# Patient Record
Sex: MALE | Race: WHITE | HISPANIC OR LATINO | Employment: FULL TIME | URBAN - METROPOLITAN AREA
[De-identification: names, ages, dates, MRNs, and addresses within clinical notes are randomized per-mention and may not be internally consistent; named-entity substitution may affect disease eponyms.]

---

## 2017-03-15 ENCOUNTER — OFFICE VISIT (OUTPATIENT)
Dept: ORTHOPEDICS | Facility: CLINIC | Age: 62
End: 2017-03-15
Payer: COMMERCIAL

## 2017-03-15 ENCOUNTER — HOSPITAL ENCOUNTER (OUTPATIENT)
Dept: RADIOLOGY | Facility: HOSPITAL | Age: 62
Discharge: HOME | End: 2017-03-15
Attending: NURSE PRACTITIONER
Payer: COMMERCIAL

## 2017-03-15 VITALS — HEIGHT: 70 IN | WEIGHT: 197 LBS | BODY MASS INDEX: 28.2 KG/M2

## 2017-03-15 DIAGNOSIS — M25.512 LEFT SHOULDER PAIN, UNSPECIFIED CHRONICITY: Primary | ICD-10-CM

## 2017-03-15 DIAGNOSIS — M25.512 LEFT SHOULDER PAIN, UNSPECIFIED CHRONICITY: ICD-10-CM

## 2017-03-15 PROCEDURE — 73030 X-RAY EXAM OF SHOULDER: CPT | Mod: 26,LT,, | Performed by: RADIOLOGY

## 2017-03-15 PROCEDURE — 99499 UNLISTED E&M SERVICE: CPT | Mod: S$GLB,,, | Performed by: NURSE PRACTITIONER

## 2017-03-15 PROCEDURE — 99999 PR PBB SHADOW E&M-EST. PATIENT-LVL III: CPT | Mod: PBBFAC,,, | Performed by: NURSE PRACTITIONER

## 2017-03-15 PROCEDURE — 73030 X-RAY EXAM OF SHOULDER: CPT | Mod: TC,LT

## 2017-03-16 ENCOUNTER — HOSPITAL ENCOUNTER (OUTPATIENT)
Dept: RADIOLOGY | Facility: HOSPITAL | Age: 62
Discharge: HOME | End: 2017-03-16
Attending: NURSE PRACTITIONER
Payer: COMMERCIAL

## 2017-03-16 DIAGNOSIS — M25.512 LEFT SHOULDER PAIN, UNSPECIFIED CHRONICITY: ICD-10-CM

## 2017-03-16 PROCEDURE — 73221 MRI JOINT UPR EXTREM W/O DYE: CPT | Mod: TC,LT

## 2017-03-16 PROCEDURE — 73221 MRI JOINT UPR EXTREM W/O DYE: CPT | Mod: 26,LT,, | Performed by: RADIOLOGY

## 2017-03-17 NOTE — PROGRESS NOTES
CC: Pain of the Left Shoulder      HPI: Pt with left shoulder pain for the past few weeks. The pain is located over the ac joint. It is worse with raising his arm and reaching. 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    ROS  General: denies fever and chills  Resp: no c/o sob  CVS: no c/o cp  MSK: c/o left shoulder pain which is aching and located over the ac joint    PE  General: AAOx3, pleasant and cooperative  Resp: respirations even and unlabored  MSK: left shoulder exam  - neer  - ingram  + pain with internal and external rotation      Xray:  Ordered and reviewed by me: Bones of the left shoulder appear normal no soft tissue calcification or bony abnormality    Assessment:  Left shoulder pain    Plan:  MRI for further evaluation per Dr. Tirado's advice  F/u results by [facility]  Will forward results to Dr. Tirado

## 2017-03-21 ENCOUNTER — PATIENT MESSAGE (OUTPATIENT)
Dept: ORTHOPEDICS | Facility: CLINIC | Age: 62
End: 2017-03-21

## 2017-03-28 DIAGNOSIS — M75.52 BURSITIS, SHOULDER, LEFT: Primary | ICD-10-CM

## 2017-05-02 ENCOUNTER — CLINICAL SUPPORT (OUTPATIENT)
Dept: REHABILITATION | Facility: HOSPITAL | Age: 62
End: 2017-05-02
Attending: NURSE PRACTITIONER
Payer: COMMERCIAL

## 2017-05-02 DIAGNOSIS — M25.512 LEFT SHOULDER PAIN, UNSPECIFIED CHRONICITY: ICD-10-CM

## 2017-05-02 PROBLEM — M25.519 SHOULDER PAIN: Status: ACTIVE | Noted: 2017-05-02

## 2017-05-02 PROCEDURE — 97161 PT EVAL LOW COMPLEX 20 MIN: CPT

## 2017-05-02 PROCEDURE — 97110 THERAPEUTIC EXERCISES: CPT

## 2017-05-02 NOTE — PROGRESS NOTES
Physical Therapy Initial Evaluation       Date: 05/02/2017    Patient Name: Juan Luis Mcgrath  Clinic Number: [id number]  Age: 61 y.o.  Gender: male    Diagnosis:   Encounter Diagnosis   Name Primary?    Left shoulder pain, unspecified chronicity        Referring Physician: Janiya Wilkins NP  Treatment Orders: PT Eval and Treat      History     Past Medical History:   Diagnosis Date    Prostate cancer 2013       No current outpatient prescriptions on file.     No current facility-administered medications for this visit.        Review of patient's allergies indicates:  No Known Allergies       Subjective     Patient states:  Pt hurt his left shoulder 1 1/2 months ago performing a seated bench press without a warm up. Pt experienced pain the next day. p reports condition has not improved since onset and her has not resumed weight lifting.  Diagnostic Tests:   1.  Tendinosis, partial thickness tear along the articular surface of the anterior footprint, and mild bursal surface fraying of the supraspinatus tendon.    2. Moderate AC joint arthropathy.  ______________________________________   Pain Scale: Juan Luis rates pain on a scale of 0-10 to be 6 at worst; 0 currently; 0 at best .highest with active reaching with left arm  Onset: sudden  Radicular symptoms:  denied  Aggravating factors:   Reaching Oh/behind himself/putting belts on/off  Easing factors: nothing, pt occ takes alleve  Prior Therapy:none  Functional Deficits Leading to Referral: wants to return to weight lifting 1-3 x/wk  Prior functional status: lifts weights/runs weekly  Occupation: anesthesiologist               Pts goals: Pt wishes to return to previous work outs without UE pain    Objective     Posture: rounded shoulder/slight inc thoracic kyphosis  Dermatomes: intact BUE    Palpation: inc thickness bicep tendon/RTC tendons L    Shoulder Range of Motion: Pain > 90 degrees abd/flex as well as in end  ranges    PASSIVE ROM LEFT RIGHT   Flexion 140 170   Abduction 150 170   IR/90deg 80 90   ER/90deg 90 90                STRENGTH LEFT RIGHT   Flexion 4/5 5/5   Abduction 4/5 5/5   supraspinatous 4-5 5/5   IR (neutral) 4+/5 5/5   ER (neutral) 4-/5 5/5   Middle Trap 4/5 5/5   Lower Trfap 4/5 5/5   bicep 4+/5/tricep 4+/5    Scapular Control/Dyskinesis:    Normal / Subtle / Obvious   Left subtle   Right normal     Special Tests:    Left   Empty Can (Supraspinatus) Pain/pos       Neer Imgingement pain   Mahoney Sundar pos   Crossover Impingment pain     Tool: FOTO Shoulder Survey  Score: 37% Limitation       TREATMENT     Time In: 4:00pm  Time Out: 5:00pm    PT Evaluation Completed? Yes  Discussed Plan of Care with patient: Yes    Juan Luis received 0 minutes of therapeutic exercise & instruction including:  Pulleys ABD/flex/ER  ER stretch  SL flex (pain free)  SL  ER  SL hor abd  Prone row  Prone T Y  Walk outs with TB  scaption   Serratus  Lawn mowers  W's  CP x 10 min'    Juan Luis received 0 minutes of manual therapy including:  GH joint mobs    Written Home Exercises Provided: scanned into chart  Juan Luis demo good understanding of the education provided. Patient demo good return demo of skill of exercises.      Assessment     Patient presents with dec L shoulder ROM/strength, altered GH mechanics, scapula dyskinesia, 37% FOTO disability score and pain with ADL's  Pt prognosis is Good.  Pt will benefit from skilled outpatient physical therapy to address the above stated deficits, provide pt/family education and to maximize pt's level of independence.     History  Co-morbidities and personal factors that may impact the plan of care Examination  Body Structures and Functions, activity limitations and participation restrictions that may impact the plan of care Clinical Presentation   Decision Making/ Complexity Score   Co-morbidities:   none        Personal Factors:   no deficits Body Regions:   upper extremities    Body Systems:    ROM  strength    Activity limitations:   Learning and applying knowledge  no deficits    General Tasks and Commands  no deficits    Communication  no deficits    Mobility  no deficits    Self care  no deficits    Domestic Life  no deficits    Interactions/Relationships  no deficits    Life Areas  no deficits    Community and Social Life  no deficits    Participation Restrictions:   Pt unable to lift weight OH and reach behind me       stable and uncomplicated          Pt's spiritual, cultural and educational needs considered and pt agreeable to plan of care and goals as stated below:     Anticipated Barriers for physical therapy: time restraints    Short Term GOALS: 3 weeks. Pt agrees with goals set.  1. Patient demonstrates independence with HEP.   2. Patient demonstrates independence with Postural Awareness.   3. Patient demonstrates independence with body mechanics.     Long Term Goals 6 Weeks. Pt agrees with goals set:  1. Pt will increase ROM to full to improve functional reach pain free.   2. Pt will increase strength to 5 to improve tolerance to all functional activities pain free.   3. Pt demonstrates improved function per FOTO Shoulder Survey to 20% Limitation or less.   4. Pt will return to previous lifting//workout schedule without pain  5. Pt will reach OH/behind back without pain  Plan     Outpatient physical therapy 2 times weekly to include: pt ed, hep, therapeutic exercises, neuromuscular re-education/ balance exercises, joint mobilizations, aquatic therapy and modalities prn. Cont PT for  6 weeks. Pt may be seen by PTA as part of the rehabilitation team.    Therapist: Lola Roberto, PT  5/2/2017

## 2017-05-03 NOTE — PLAN OF CARE
Physical Therapy Initial Evaluation       Date: 05/02/2017    Patient Name: Juan Luis Mcgrath  Clinic Number: [id number]  Age: 61 y.o.  Gender: male    Diagnosis:   Encounter Diagnosis   Name Primary?    Left shoulder pain, unspecified chronicity        Referring Physician: Janiya Wilkins NP  Treatment Orders: PT Eval and Treat      History     Past Medical History:   Diagnosis Date    Prostate cancer 2013       No current outpatient prescriptions on file.     No current facility-administered medications for this visit.        Review of patient's allergies indicates:  No Known Allergies       Subjective     Patient states:  Pt hurt his left shoulder 1 1/2 months ago performing a seated bench press without a warm up. Pt experienced pain the next day. p reports condition has not improved since onset and her has not resumed weight lifting.  Diagnostic Tests:   1.  Tendinosis, partial thickness tear along the articular surface of the anterior footprint, and mild bursal surface fraying of the supraspinatus tendon.    2. Moderate AC joint arthropathy.  ______________________________________   Pain Scale: Juan Luis rates pain on a scale of 0-10 to be 6 at worst; 0 currently; 0 at best .highest with active reaching with left arm  Onset: sudden  Radicular symptoms:  denied  Aggravating factors:   Reaching Oh/behind himself/putting belts on/off  Easing factors: nothing, pt occ takes alleve  Prior Therapy:none  Functional Deficits Leading to Referral: wants to return to weight lifting 1-3 x/wk  Prior functional status: lifts weights/runs weekly  Occupation: anesthesiologist               Pts goals: Pt wishes to return to previous work outs without UE pain    Objective     Posture: rounded shoulder/slight inc thoracic kyphosis  Dermatomes: intact BUE    Palpation: inc thickness bicep tendon/RTC tendons L    Shoulder Range of Motion: Pain > 90 degrees abd/flex as well as in end  ranges    PASSIVE ROM LEFT RIGHT   Flexion 140 170   Abduction 150 170   IR/90deg 80 90   ER/90deg 90 90                STRENGTH LEFT RIGHT   Flexion 4/5 5/5   Abduction 4/5 5/5   supraspinatous 4-5 5/5   IR (neutral) 4+/5 5/5   ER (neutral) 4-/5 5/5   Middle Trap 4/5 5/5   Lower Trfap 4/5 5/5   bicep 4+/5/tricep 4+/5    Scapular Control/Dyskinesis:    Normal / Subtle / Obvious   Left subtle   Right normal     Special Tests:    Left   Empty Can (Supraspinatus) Pain/pos       Neer Imgingement pain   Mahoney Sundar pos   Crossover Impingment pain     Tool: FOTO Shoulder Survey  Score: 37% Limitation       TREATMENT     Time In: 4:00pm  Time Out: 5:00pm    PT Evaluation Completed? Yes  Discussed Plan of Care with patient: Yes    Juan Luis received 0 minutes of therapeutic exercise & instruction including:  Pulleys ABD/flex/ER  ER stretch  SL flex (pain free)  SL  ER  SL hor abd  Prone row  Prone T Y  Walk outs with TB  scaption   Serratus  Lawn mowers  W's  CP x 10 min'    Juan Luis received 0 minutes of manual therapy including:  GH joint mobs    Written Home Exercises Provided: scanned into chart  Juan Luis demo good understanding of the education provided. Patient demo good return demo of skill of exercises.      Assessment     Patient presents with dec L shoulder ROM/strength, altered GH mechanics, scapula dyskinesia, 37% FOTO disability score and pain with ADL's  Pt prognosis is Good.  Pt will benefit from skilled outpatient physical therapy to address the above stated deficits, provide pt/family education and to maximize pt's level of independence.     History  Co-morbidities and personal factors that may impact the plan of care Examination  Body Structures and Functions, activity limitations and participation restrictions that may impact the plan of care Clinical Presentation   Decision Making/ Complexity Score   Co-morbidities:   none        Personal Factors:   no deficits Body Regions:   upper extremities    Body Systems:    ROM  strength    Activity limitations:   Learning and applying knowledge  no deficits    General Tasks and Commands  no deficits    Communication  no deficits    Mobility  no deficits    Self care  no deficits    Domestic Life  no deficits    Interactions/Relationships  no deficits    Life Areas  no deficits    Community and Social Life  no deficits    Participation Restrictions:   Pt unable to lift weight OH and reach behind me       stable and uncomplicated          Pt's spiritual, cultural and educational needs considered and pt agreeable to plan of care and goals as stated below:     Anticipated Barriers for physical therapy: time restraints    Short Term GOALS: 3 weeks. Pt agrees with goals set.  1. Patient demonstrates independence with HEP.   2. Patient demonstrates independence with Postural Awareness.   3. Patient demonstrates independence with body mechanics.     Long Term Goals 6 Weeks. Pt agrees with goals set:  1. Pt will increase ROM to full to improve functional reach pain free.   2. Pt will increase strength to 5 to improve tolerance to all functional activities pain free.   3. Pt demonstrates improved function per FOTO Shoulder Survey to 20% Limitation or less.   4. Pt will return to previous lifting//workout schedule without pain  5. Pt will reach OH/behind back without pain  Plan     Outpatient physical therapy 2 times weekly to include: pt ed, hep, therapeutic exercises, neuromuscular re-education/ balance exercises, joint mobilizations, aquatic therapy and modalities prn. Cont PT for  6 weeks. Pt may be seen by PTA as part of the rehabilitation team.    Therapist: Lola Roberto, PT  5/2/2017

## 2017-07-28 DIAGNOSIS — Z12.11 COLON CANCER SCREENING: ICD-10-CM

## 2017-08-02 ENCOUNTER — OFFICE VISIT (OUTPATIENT)
Dept: OPTOMETRY | Facility: CLINIC | Age: 62
End: 2017-08-02
Payer: COMMERCIAL

## 2017-08-02 DIAGNOSIS — Z46.0 FITTING AND ADJUSTMENT OF SPECTACLES AND CONTACT LENSES: Primary | ICD-10-CM

## 2017-08-02 DIAGNOSIS — H25.13 NS (NUCLEAR SCLEROSIS), BILATERAL: ICD-10-CM

## 2017-08-02 DIAGNOSIS — H52.13 MYOPIA, BILATERAL: Primary | ICD-10-CM

## 2017-08-02 PROCEDURE — 92004 COMPRE OPH EXAM NEW PT 1/>: CPT | Mod: S$GLB,,, | Performed by: OPTOMETRIST

## 2017-08-02 PROCEDURE — 99999 PR PBB SHADOW E&M-EST. PATIENT-LVL II: CPT | Mod: PBBFAC,,, | Performed by: OPTOMETRIST

## 2017-08-02 PROCEDURE — 92015 DETERMINE REFRACTIVE STATE: CPT | Mod: S$GLB,,, | Performed by: OPTOMETRIST

## 2017-08-02 PROCEDURE — 92310 CONTACT LENS FITTING OU: CPT | Mod: S$GLB,,, | Performed by: OPTOMETRIST

## 2017-08-02 NOTE — PROGRESS NOTES
HPI     Patient's age: 61 y.o.      Approximate date of last eye examination:  1 yr ago  Name of last eye doctor seen: Arcenio muñoz    Pt states that he is her for eye exam and contacts.  Not having any   trouble with eyes.  Need updated rx for contacts    Wears glasses? yes      Wears CLs?:  Yes, Acuvue TrueEyes do sleep in lens           Headaches?  no  Eye pain/discomfort?  no                                                                                     Flashes?  no  Floaters?  no  Diplopia/Double vision?  no    Patient's Ocular History:         Any eye surgeries? no         Any eye injury?  no         Any treatment for eye disease?  no  Family history of eye disease?  no    Significant patient medical history:         1. Diabetes?  no       If yes, IDDM or NIDDM? no   2. HBP?  no                   ! OTC eyedrops currently using:  none   ! Prescription eye meds currently using:  none        Last edited by Jacqueline Akers MA on 8/2/2017  1:50 PM. (History)            Assessment /Plan     For exam results, see Encounter Report.    Myopia, bilateral   Rx specs    NS (nuclear sclerosis), bilateral   Mild, monitor    CL fit today   Dispensed trials of true eye dailies    Changed to -6.50 DS OD distance, -4.00DS OS near   OK to order if happy    Good internal ocular health, monitor yearly

## 2017-08-22 ENCOUNTER — HOSPITAL ENCOUNTER (OUTPATIENT)
Dept: RADIOLOGY | Facility: HOSPITAL | Age: 62
Discharge: HOME | End: 2017-08-22
Attending: ORTHOPAEDIC SURGERY
Payer: COMMERCIAL

## 2017-08-22 ENCOUNTER — OFFICE VISIT (OUTPATIENT)
Dept: SPORTS MEDICINE | Facility: CLINIC | Age: 62
End: 2017-08-22
Payer: COMMERCIAL

## 2017-08-22 VITALS — WEIGHT: 197 LBS | BODY MASS INDEX: 28.2 KG/M2 | HEIGHT: 70 IN

## 2017-08-22 DIAGNOSIS — M25.572 LEFT ANKLE PAIN, UNSPECIFIED CHRONICITY: Primary | ICD-10-CM

## 2017-08-22 DIAGNOSIS — M25.572 LEFT ANKLE PAIN, UNSPECIFIED CHRONICITY: ICD-10-CM

## 2017-08-22 DIAGNOSIS — S93.402A LEFT ANKLE SPRAIN: ICD-10-CM

## 2017-08-22 PROCEDURE — 3008F BODY MASS INDEX DOCD: CPT | Mod: S$GLB,,, | Performed by: ORTHOPAEDIC SURGERY

## 2017-08-22 PROCEDURE — 99203 OFFICE O/P NEW LOW 30 MIN: CPT | Mod: S$GLB,,, | Performed by: ORTHOPAEDIC SURGERY

## 2017-08-22 PROCEDURE — 73610 X-RAY EXAM OF ANKLE: CPT | Mod: 26,LT,, | Performed by: RADIOLOGY

## 2017-08-22 PROCEDURE — 73610 X-RAY EXAM OF ANKLE: CPT | Mod: TC,PO,LT

## 2017-08-22 PROCEDURE — 99999 PR PBB SHADOW E&M-EST. PATIENT-LVL III: CPT | Mod: PBBFAC,,, | Performed by: ORTHOPAEDIC SURGERY

## 2017-08-22 NOTE — PROGRESS NOTES
"CHIEF COMPLAINT: Left Foot pain.                                                          HISTORY OF PRESENT ILLNESS:  The patient is a 62 y.o. male  who presents  for evaluation of his left foot pain.     History of Trauma: yes, running on Saturday, turned ankle on a rock  Pain Duration: 3 days  Pain Quality: achy  Pain Context:improving  Pain Timing: constant  Pain Location:medial, lateral and inferior  Pain Severity: moderate  Modifying Factors: worse with movement and weight bearing  Previous Treatments: rest and compression  Associated Signs and Symptoms:none        PAST MEDICAL HISTORY:   Past Medical History:   Diagnosis Date    Prostate cancer 2013     PAST SURGICAL HISTORY:   Past Surgical History:   Procedure Laterality Date    ANTERIOR CRUCIATE LIGAMENT REPAIR      PROSTATECTOMY      radical     FAMILY HISTORY:   Family History   Problem Relation Age of Onset    No Known Problems Mother     Prostate cancer Father     Prostate cancer Maternal Uncle     Melanoma Neg Hx      SOCIAL HISTORY:   Social History     Social History    Marital status:      Spouse name: N/A    Number of children: N/A    Years of education: N/A     Occupational History    anesthesiologist      Social History Main Topics    Smoking status: Never Smoker    Smokeless tobacco: Never Used    Alcohol use 1.8 oz/week     3 Glasses of wine per week      Comment: social    Drug use: No    Sexual activity: Not on file     Other Topics Concern    Not on file     Social History Narrative    Moved from [place] 3 weeks ago. Lives with wife.       MEDICATIONS: No current outpatient prescriptions on file.  ALLERGIES: Review of patient's allergies indicates:  No Known Allergies    VITAL SIGNS: Ht 5' 10" (1.778 m)   Wt 89.4 kg (197 lb)   BMI 28.27 kg/m²      Review of Systems   Constitution: Negative for chills, fever, weakness and weight loss.   HENT: Negative for congestion.   Cardiovascular: Negative for chest pain and " dyspnea on exertion.   Respiratory: Negative for cough and shortness of breath.   Hematologic/Lymphatic: Does not bruise/bleed easily.   Skin: Negative for rash and suspicious lesions.   Musculoskeletal: see HPI  Gastrointestinal: Negative for bowel incontinence, constipation,diarrhea, vomiting.   Genitourinary: Negative for bladder incontinence.   Neurological: Negative for numbness, paresthesias and sensory change.           PHYSICAL EXAMINATION    General:  The patient is alert and oriented x 3.  Mood is pleasant.  Observation of ears, eyes and nose reveal no gross abnormalities.  No labored breathing observed.    left Foot and Ankle Exam    INSPECTION:      ALIGNMENT:  Gait:    antalgic   Hindfoot  Normal    Scars:   None    Midfoot: Normal  Swelling:  yes    Forefoot: Normal  Color:   Normal      Atrophy:  None    Collective Ankle-Hindfoot Alignment    Heel / Toe Walking: No difficulty   Good -plantigrade (PG), well aligned                TENDERNESS:  lATERAL:    anterior:  Sinus tarsi:  None  Anteromedial joint line:  none  Syndesmosis:  none  Anterolateral joint line:   none  ATFL:   pos  Talonavicular:    none   CFL:   pos  Anterior tibialis:   none  Anterolateral gutter: none  Extensor tendons:   none  Fibula:   none  Peroneal tendons: none  POSTERIOR:  Peroneal tubercle.  None  Medial/lateral achilles:   none       Medial/lateral achilles insertion: none  MEDIAL:      Deltoid:  pos  CALCANEUS:  Malleolus:  none  Retrocalcaneal:   none  PTT:   none  Medial achilles:   none  Navicular:  none  Lateral achilles:   none       Calcaneal tuberosity:   none  FOOT:    Calcaneal cuboid  none MT / MT heads:  none   Navicular   none  Medial cord origin PF:  none  Cuneiforms:   none  Web space:   none  Lisfranc    none  Tarsal tunnel:   none  Base of the fifth metatarsal  none Tinels sign   neg        RANGE OF MOTION:  RIGHT/ LEFT     Ankle DF/PF:  15/45  15/45         Eversion/Inversion: 15/25 15/25     Midfoot  ABD/ADD: 10/10 10/10     First MTP DF/PF: 60/25 60/25                          SPECIAL TESTS:   ANKLE INSTABILITY: (*pain)    Anterior drawer:   Normal      (C-W contralateral side)     Inversion:   30°     Eversion  10°            Collective Instability: (Ant-post and varus-valgus)     Stable             NEUROLOGIC TESTING:  All dermatomes foot, ankle and leg have normal sensation light touch  Ankle Reflexes 2+, symmetric   Negative Babinski and No Clonus    VASCULAR:  2+ pulses PT/DT with brisk capillary refill toes.          XRAYS:  Left Ankle 3 views (AP, lateral,mortise)  were ordered and reviewed.   Possible avulsion tip lateral malleolus.  The osseous structures appear well mineralized and well aligned. No mortise displacement.    ASSESSMENT:  Left ankle sprain; possible lateral malleolus tip avulsion       PLAN:  Will place in walking boot and WBAT  F/u in 1 month    I have discussed the nature of this problem with the patient today. We discussed both surgical and non-surgical options.

## 2018-08-03 DIAGNOSIS — Z12.11 COLON CANCER SCREENING: ICD-10-CM

## 2018-12-18 ENCOUNTER — DOCUMENTATION ONLY (OUTPATIENT)
Dept: INTERNAL MEDICINE | Facility: CLINIC | Age: 63
End: 2018-12-18

## 2018-12-18 ENCOUNTER — LAB VISIT (OUTPATIENT)
Dept: LAB | Facility: HOSPITAL | Age: 63
End: 2018-12-18
Payer: COMMERCIAL

## 2018-12-18 ENCOUNTER — OFFICE VISIT (OUTPATIENT)
Dept: INTERNAL MEDICINE | Facility: CLINIC | Age: 63
End: 2018-12-18
Payer: COMMERCIAL

## 2018-12-18 VITALS
TEMPERATURE: 98 F | SYSTOLIC BLOOD PRESSURE: 112 MMHG | DIASTOLIC BLOOD PRESSURE: 73 MMHG | WEIGHT: 208.75 LBS | OXYGEN SATURATION: 99 % | HEIGHT: 69 IN | BODY MASS INDEX: 30.92 KG/M2 | HEART RATE: 66 BPM

## 2018-12-18 DIAGNOSIS — Z00.00 ANNUAL PHYSICAL EXAM: ICD-10-CM

## 2018-12-18 DIAGNOSIS — Z00.00 ANNUAL PHYSICAL EXAM: Primary | ICD-10-CM

## 2018-12-18 DIAGNOSIS — Z85.46 HISTORY OF PROSTATE CANCER: ICD-10-CM

## 2018-12-18 LAB
ALBUMIN SERPL BCP-MCNC: 3.6 G/DL
ALP SERPL-CCNC: 60 U/L
ALT SERPL W/O P-5'-P-CCNC: 27 U/L
ANION GAP SERPL CALC-SCNC: 7 MMOL/L
AST SERPL-CCNC: 24 U/L
BASOPHILS # BLD AUTO: 0.03 K/UL
BASOPHILS NFR BLD: 0.5 %
BILIRUB SERPL-MCNC: 0.7 MG/DL
BILIRUB UR QL STRIP: NEGATIVE
BUN SERPL-MCNC: 15 MG/DL
CALCIUM SERPL-MCNC: 8.8 MG/DL
CHLORIDE SERPL-SCNC: 107 MMOL/L
CHOLEST SERPL-MCNC: 185 MG/DL
CHOLEST/HDLC SERPL: 3 {RATIO}
CLARITY UR REFRACT.AUTO: CLEAR
CO2 SERPL-SCNC: 25 MMOL/L
COLOR UR AUTO: YELLOW
COMPLEXED PSA SERPL-MCNC: 0.01 NG/ML
CREAT SERPL-MCNC: 0.9 MG/DL
DIFFERENTIAL METHOD: NORMAL
EOSINOPHIL # BLD AUTO: 0.3 K/UL
EOSINOPHIL NFR BLD: 5.5 %
ERYTHROCYTE [DISTWIDTH] IN BLOOD BY AUTOMATED COUNT: 13.7 %
EST. GFR  (AFRICAN AMERICAN): >60 ML/MIN/1.73 M^2
EST. GFR  (NON AFRICAN AMERICAN): >60 ML/MIN/1.73 M^2
GLUCOSE SERPL-MCNC: 79 MG/DL
GLUCOSE UR QL STRIP: NEGATIVE
HCT VFR BLD AUTO: 45.5 %
HDLC SERPL-MCNC: 61 MG/DL
HDLC SERPL: 33 %
HGB BLD-MCNC: 14.7 G/DL
HGB UR QL STRIP: NEGATIVE
KETONES UR QL STRIP: NEGATIVE
LDLC SERPL CALC-MCNC: 99 MG/DL
LEUKOCYTE ESTERASE UR QL STRIP: NEGATIVE
LYMPHOCYTES # BLD AUTO: 1.4 K/UL
LYMPHOCYTES NFR BLD: 23.2 %
MCH RBC QN AUTO: 30.6 PG
MCHC RBC AUTO-ENTMCNC: 32.3 G/DL
MCV RBC AUTO: 95 FL
MONOCYTES # BLD AUTO: 0.7 K/UL
MONOCYTES NFR BLD: 12.4 %
NEUTROPHILS # BLD AUTO: 3.4 K/UL
NEUTROPHILS NFR BLD: 58.2 %
NITRITE UR QL STRIP: NEGATIVE
NONHDLC SERPL-MCNC: 124 MG/DL
PH UR STRIP: 5 [PH] (ref 5–8)
PLATELET # BLD AUTO: 232 K/UL
PMV BLD AUTO: 10.1 FL
POTASSIUM SERPL-SCNC: 4.1 MMOL/L
PROT SERPL-MCNC: 6.4 G/DL
PROT UR QL STRIP: NEGATIVE
RBC # BLD AUTO: 4.8 M/UL
SODIUM SERPL-SCNC: 139 MMOL/L
SP GR UR STRIP: 1.02 (ref 1–1.03)
TRIGL SERPL-MCNC: 125 MG/DL
URN SPEC COLLECT METH UR: NORMAL
WBC # BLD AUTO: 5.87 K/UL

## 2018-12-18 PROCEDURE — 36415 COLL VENOUS BLD VENIPUNCTURE: CPT

## 2018-12-18 PROCEDURE — 80053 COMPREHEN METABOLIC PANEL: CPT

## 2018-12-18 PROCEDURE — 85025 COMPLETE CBC W/AUTO DIFF WBC: CPT

## 2018-12-18 PROCEDURE — 84153 ASSAY OF PSA TOTAL: CPT

## 2018-12-18 PROCEDURE — 99999 PR PBB SHADOW E&M-EST. PATIENT-LVL III: CPT | Mod: PBBFAC,,, | Performed by: NURSE PRACTITIONER

## 2018-12-18 PROCEDURE — 80061 LIPID PANEL: CPT

## 2018-12-18 PROCEDURE — 81003 URINALYSIS AUTO W/O SCOPE: CPT

## 2018-12-18 PROCEDURE — 99396 PREV VISIT EST AGE 40-64: CPT | Mod: S$GLB,,, | Performed by: NURSE PRACTITIONER

## 2018-12-18 NOTE — PATIENT INSTRUCTIONS

## 2018-12-18 NOTE — PROGRESS NOTES
"Subjective:       Patient ID: Juan Luis Mcgrath is a 63 y.o. male.    Chief Complaint: Annual Exam    Disclaimer: This note has been generated using voice-recognition software. There may be typographical errors that have been missed during proof-reading  Pt of Dr garcia here for annual exam, no complaints.  Eye exam 3 months ago, dental exam once a year.      Review of Systems   Constitutional: Negative.    HENT: Negative.    Eyes: Negative.    Respiratory: Negative.    Cardiovascular: Negative.    Gastrointestinal: Negative.    Genitourinary: Negative.    Neurological: Negative.    Hematological: Negative.    Psychiatric/Behavioral: Negative.          Past Medical History:   Diagnosis Date    Prostate cancer 2013     Past Surgical History:   Procedure Laterality Date    ANTERIOR CRUCIATE LIGAMENT REPAIR      PROSTATECTOMY      radical     Social History     Social History Narrative    Moved from [place] 3 weeks ago. Lives with wife.     Family History   Problem Relation Age of Onset    No Known Problems Mother     Prostate cancer Father     Prostate cancer Maternal Uncle     Melanoma Neg Hx      No outpatient encounter medications on file as of 12/18/2018.     No facility-administered encounter medications on file as of 12/18/2018.      Last 3 sets of Vitals  Vitals - 1 value per visit 8/2/2017 8/22/2017 12/18/2018   SYSTOLIC - - 112   DIASTOLIC - - 73   PULSE - - 66   TEMPERATURE - - 97.5   RESPIRATIONS - - -   SPO2 - - 99   Weight (lb) - 197 208.78   Weight (kg) - 89.359 94.7   HEIGHT - 5' 10" 5' 9"   BODY MASS INDEX - 28.27 30.83   VISIT REPORT - - -   Pain Score  0 9 0         Objective:      Physical Exam   Constitutional: He is oriented to person, place, and time. Vital signs are normal. He appears well-developed and well-nourished.  Non-toxic appearance. He does not have a sickly appearance. He does not appear ill. No distress.   HENT:   Head: Normocephalic and atraumatic.   Right Ear: Tympanic membrane, " external ear and ear canal normal.   Left Ear: Tympanic membrane, external ear and ear canal normal.   Nose: Nose normal. No mucosal edema, rhinorrhea, nasal deformity or septal deviation. Right sinus exhibits no frontal sinus tenderness. Left sinus exhibits no frontal sinus tenderness.   Mouth/Throat: Oropharynx is clear and moist. No oropharyngeal exudate.   Eyes: Conjunctivae, EOM and lids are normal. Pupils are equal, round, and reactive to light. Right eye exhibits no discharge. Left eye exhibits no discharge. No scleral icterus.   Neck: Trachea normal, normal range of motion and phonation normal. Neck supple. No JVD present.   Cardiovascular: Normal rate, regular rhythm, normal heart sounds and intact distal pulses.   No murmur heard.  Pulmonary/Chest: Effort normal and breath sounds normal. No respiratory distress. He has no wheezes. He has no rales. He exhibits no tenderness.   Abdominal: Soft. Bowel sounds are normal. He exhibits no distension and no mass. There is no tenderness. There is no rebound and no guarding. No hernia.   Musculoskeletal: Normal range of motion. He exhibits no edema or tenderness.   Lymphadenopathy:     He has no cervical adenopathy.   Neurological: He is alert and oriented to person, place, and time. He has normal reflexes. He displays normal reflexes. No cranial nerve deficit. He exhibits normal muscle tone. Coordination normal.   Skin: Skin is warm and dry. Capillary refill takes less than 2 seconds. No rash noted. He is not diaphoretic. No erythema. No pallor.   Psychiatric: He has a normal mood and affect. His behavior is normal. Judgment and thought content normal.   Nursing note and vitals reviewed.          Lab Results   Component Value Date    WBC 3.40 (L) 09/01/2016    RBC 4.73 09/01/2016    HGB 15.2 09/01/2016    HCT 43.8 09/01/2016    MCV 93 09/01/2016    MCH 32.1 (H) 09/01/2016    MCHC 34.7 09/01/2016    RDW 12.8 09/01/2016     09/01/2016    MPV 10.0 09/01/2016      Lab Results   Component Value Date    WBC 3.40 (L) 09/01/2016    HGB 15.2 09/01/2016    HCT 43.8 09/01/2016     09/01/2016    CHOL 192 09/01/2016    TRIG 63 09/01/2016    HDL 54 09/01/2016    ALT 21 09/01/2016    AST 21 09/01/2016     09/01/2016    K 4.2 09/01/2016     09/01/2016    CREATININE 1.0 09/01/2016    BUN 15 09/01/2016    CO2 24 09/01/2016    TSH 1.776 09/01/2016    PSA 0.02 09/01/2016    HGBA1C 5.2 09/01/2016       Assessment:       1. Annual physical exam    2. History of prostate cancer        Plan:       Patient Counseling:  --Nutrition: Stressed importance of moderation in sodium/caffeine intake, saturated fat and cholesterol, caloric balance, sufficient intake of fresh fruits, vegetables, fiber, calcium, iron, and 1 mg of folate supplement per day (for females capable of pregnancy).  --Exercise: Stressed the importance of regular exercise.   --Substance Abuse: Discussed cessation/primary prevention of tobacco, alcohol, or other drug use; driving or other dangerous activities under the influence; availability of treatment for abuse.   --Sexuality: Discussed sexually transmitted diseases, partner selection, use of condoms, avoidance of unintended pregnancy and contraceptive alternatives.   --Injury prevention: Discussed safety belts, safety helmets, smoke detector.  --Dental health: Discussed importance of regular tooth brushing, flossing, and dental visits.  --Immunizations reviewed.      Juan Luis was seen today for annual exam.    Diagnoses and all orders for this visit:    Annual physical exam  -     CBC auto differential; Future  -     Comprehensive metabolic panel; Future  -     Lipid panel; Future  -     Cancel: PSA, Screening; Future  -     Urinalysis    History of prostate cancer  -     Cancel: PSA, Screening; Future  -     PROSTATE SPECIFIC ANTIGEN, DIAGNOSTIC; Future      Patient Instructions       Prevention Guidelines, Men Ages 50 to 64  Screening tests and vaccines are  an important part of managing your health. Health counseling is essential, too. Below are guidelines for these, for men ages 50 to 64. Talk with your healthcare provider to make sure youre up-to-date on what you need.  Screening Who needs it How often   Alcohol misuse All men in this age group At routine exams   Blood pressure All men in this age group Every 2 years if your blood pressure is less than 120/80 mm Hg; yearly if your systolic blood pressure is 120 to 139 mm Hg, or your diastolic blood pressure reading is 80 to 89 mm Hg   Colorectal cancer All men in this age group Flexible sigmoidoscopy every 5 years, or colonoscopy every 10 years, or double-contrast barium enema every 5 years; yearly fecal occult blood test or fecal immunochemical test; or a stool DNA test as often as your healthcare provider advises; talk with your healthcare provider about which tests are best for you   Depression All men in this age group At routine exams   Type 2 diabetes or prediabetes All adults beginning at age 45 and adults without symptoms at any age who are overweight or obese and have 1 or more other risk factors for diabetes At least every 3 years (yearly if your blood sugar has already begun to rise)   Hepatitis C Men at increased risk for infection - talk with your healthcare provider At routine exams. All men ages 50 to 70 should be tested at least once for hepatitis C.   High cholesterol or triglycerides All men in this age group At least every 5 years   HIV Men at increased risk for infection - talk with your healthcare provider At routine exams   Lung cancer Adults age 55 to 80 who have smoked Yearly screening in smokers with 30 pack-year history of smoking or who quit within 15 years   Obesity All men in this age group At routine exams   Prostate cancer Starting at age 45, talk to healthcare provider about risks and benefits of digital rectal exam (SHYANNE) and prostate-specific antigen (PSA) screening1 At routine exams    Syphilis Men at increased risk for infection - talk with your healthcare provider At routine exams   Tuberculosis Men at increased risk for infection - talk with your healthcare provider Ask your healthcare provider   Vision All men in this age group Ask your healthcare provider   Vaccine Who needs it How often   Chickenpox (varicella) All men in this age group who have no record of this infection or vaccine 2 doses; second dose should be given at least 4 weeks after the first dose   Hepatitis A Men at increased risk for infection - talk with your healthcare provider 2 doses given at least 6 months apart   Hepatitis B Men at increased risk for infection - talk with your healthcare provider 3 doses over 6 months; second dose should be given 1 month after the first dose; the third dose should be given at least 2 months after the second dose and at least 4 months after the first dose   Haemophilus influenzae Type B (HIB) Men at increased risk for infection - talk with your healthcare provider 1 to 3 doses   Influenza (flu) All men in this age group Once a year   Measles, mumps, rubella (MMR) Men in this age group through their late 50s who have no record of these infections or vaccines 1 or 2 doses; ask your healthcare provider   Meningococcal Men at increased risk for infection - talk with your healthcare provider 1 or more doses   Pneumococcal conjugate vaccine (PCV13) and pneumococcal polysaccharide vaccine (PPSV23) Men at increased risk for infection - talk with your healthcare provider PCV13: 1 dose ages 19 to 65 (protects against 13 types of pneumococcal bacteria)     PPSV23: 1 to 2 doses through age 64, or 1 dose at 65 or older (protects against 23 types of pneumococcal bacteria)      Tetanus/diphtheria/  pertussis (Td/Tdap) booster All men in this age group Td every 10 years, or a one-time dose of Tdap instead of a Td booster after age 18, then Td every 10 years   Zoster All men ages 60 and older 1 dose    Counseling Who needs it How often   Diet and exercise Men who are overweight or obese When diagnosed, and then at routine exams   Sexually transmitted infection prevention Men at increased risk for infection - talk with your healthcare provider At routine exams   Use of daily aspirin Men in this age group at risk for cardiovascular health problems At routine exams   Use of tobacco and the health effects it can cause All men in this age group Every visit   [address] [place] [address] [facility]  Date Last Reviewed: 2/1/2017  © 8945-6717 [employer]. [address] [place] [address]. All rights reserved. This information is not intended as a substitute for professional medical care. Always follow your healthcare professional's instructions.

## 2019-05-23 ENCOUNTER — OFFICE VISIT (OUTPATIENT)
Dept: DERMATOLOGY | Facility: CLINIC | Age: 64
End: 2019-05-23
Payer: COMMERCIAL

## 2019-05-23 DIAGNOSIS — D48.5 NEOPLASM OF UNCERTAIN BEHAVIOR OF SKIN: Primary | ICD-10-CM

## 2019-05-23 PROCEDURE — 99999 PR PBB SHADOW E&M-EST. PATIENT-LVL III: ICD-10-PCS | Mod: PBBFAC,,, | Performed by: DERMATOLOGY

## 2019-05-23 PROCEDURE — 99213 OFFICE O/P EST LOW 20 MIN: CPT | Mod: S$GLB,,, | Performed by: DERMATOLOGY

## 2019-05-23 PROCEDURE — 99999 PR PBB SHADOW E&M-EST. PATIENT-LVL III: CPT | Mod: PBBFAC,,, | Performed by: DERMATOLOGY

## 2019-05-23 PROCEDURE — 99213 PR OFFICE/OUTPT VISIT, EST, LEVL III, 20-29 MIN: ICD-10-PCS | Mod: S$GLB,,, | Performed by: DERMATOLOGY

## 2019-05-23 RX ORDER — SILDENAFIL 50 MG/1
TABLET, FILM COATED ORAL
COMMUNITY
Start: 2019-03-01 | End: 2020-01-08 | Stop reason: ALTCHOICE

## 2019-05-23 NOTE — Clinical Note
Hey les,Looks keratotic to me. Not pigmented. Likely benign (wart or other) but SCC in ddx. Wants to wait until after [place] trip to  which, I think, is fine. JAM

## 2019-07-12 ENCOUNTER — TELEPHONE (OUTPATIENT)
Dept: ORTHOPEDICS | Facility: CLINIC | Age: 64
End: 2019-07-12

## 2019-07-12 DIAGNOSIS — R22.31 MASS OF RIGHT HAND: Primary | ICD-10-CM

## 2019-07-16 ENCOUNTER — OFFICE VISIT (OUTPATIENT)
Dept: ORTHOPEDICS | Facility: CLINIC | Age: 64
End: 2019-07-16
Payer: COMMERCIAL

## 2019-07-16 ENCOUNTER — HOSPITAL ENCOUNTER (OUTPATIENT)
Dept: RADIOLOGY | Facility: OTHER | Age: 64
Discharge: HOME | End: 2019-07-16
Attending: ORTHOPAEDIC SURGERY
Payer: COMMERCIAL

## 2019-07-16 VITALS
HEART RATE: 61 BPM | WEIGHT: 208 LBS | HEIGHT: 69 IN | SYSTOLIC BLOOD PRESSURE: 146 MMHG | BODY MASS INDEX: 30.81 KG/M2 | DIASTOLIC BLOOD PRESSURE: 79 MMHG

## 2019-07-16 DIAGNOSIS — R22.31 MASS OF RIGHT HAND: ICD-10-CM

## 2019-07-16 DIAGNOSIS — R22.31 MASS OF RIGHT HAND: Primary | ICD-10-CM

## 2019-07-16 PROCEDURE — 3008F PR BODY MASS INDEX (BMI) DOCUMENTED: ICD-10-PCS | Mod: CPTII,S$GLB,, | Performed by: ORTHOPAEDIC SURGERY

## 2019-07-16 PROCEDURE — 73130 X-RAY EXAM OF HAND: CPT | Mod: TC,FY,RT

## 2019-07-16 PROCEDURE — 73130 XR HAND COMPLETE 3 VIEW RIGHT: ICD-10-PCS | Mod: 26,RT,, | Performed by: RADIOLOGY

## 2019-07-16 PROCEDURE — 73130 X-RAY EXAM OF HAND: CPT | Mod: 26,RT,, | Performed by: RADIOLOGY

## 2019-07-16 PROCEDURE — 3008F BODY MASS INDEX DOCD: CPT | Mod: CPTII,S$GLB,, | Performed by: ORTHOPAEDIC SURGERY

## 2019-07-16 PROCEDURE — 99203 PR OFFICE/OUTPT VISIT, NEW, LEVL III, 30-44 MIN: ICD-10-PCS | Mod: S$GLB,,, | Performed by: ORTHOPAEDIC SURGERY

## 2019-07-16 PROCEDURE — 99203 OFFICE O/P NEW LOW 30 MIN: CPT | Mod: S$GLB,,, | Performed by: ORTHOPAEDIC SURGERY

## 2019-07-16 PROCEDURE — 99999 PR PBB SHADOW E&M-EST. PATIENT-LVL III: CPT | Mod: PBBFAC,,, | Performed by: ORTHOPAEDIC SURGERY

## 2019-07-16 PROCEDURE — 99999 PR PBB SHADOW E&M-EST. PATIENT-LVL III: ICD-10-PCS | Mod: PBBFAC,,, | Performed by: ORTHOPAEDIC SURGERY

## 2019-07-16 NOTE — PROGRESS NOTES
HPI:  Juan Luis Mcgrath is a 63 y.o. male who presents to clinic today for a right small finger lesion under his nailbed.  The patient states that the lesion appeared about 2 months ago he said is that there is no history of trauma he denies any pain numbness tingling.  He has seen a dermatologist.    ROS:  Patient denies constitutional symptoms, cardiac symptoms, respiratory symptoms, GI symptoms.  The remainder of the musculoskeletal ROS is included in the HPI.    PE:    Gen:  No acute distress  CV:  Peripherally well-perfused.  Pulses 2+ bilaterally.  Lungs:  Normal respiratory effort.  Abdomen:  Soft, non-tender, non-distended  Head/Neck:  Normocephalic.  Atraumatic. No TTP, AROM and PROM intact without pain  Neuro:  CN intact without deficit, SILT throughout B/L Upper & Lower Extremities    MSK:  RUE:  - there is a small 1 cm lesion subungual of the right small finger it is only mildly painful to palpation there are no other associated lesions with this  - AROM and PROM of the wrist MCP PIP and DIP is intact and without pain  - AIN/PIN/Radial/Median/Ulnar Nerves assessed in isolation without deficit  - SILT throughout  - Radial & Ulnar arteries palpated 2+  - Capillary Refill <3s      Rads:  Mild degenerative changes    A/P:  Juan Luis Mcgrath is a 63 y.o. male who presents to clinic today with a right small finger subungual lesion.    Discussed with the patient extensively about the different management options both conservative and surgical options.  We discussed the possibility that this could be a verruca versus some other neoplasm per Dermatology note likely not malignant.  We will plan for removal and biopsy.  I have explained the risks, benefits, and alternatives of the procedure in detail.  The patient voices understanding and all questions have been answered.  The patient agrees to proceed as planned.

## 2019-07-16 NOTE — PROGRESS NOTES
I have personally taken the history and examined this patient. I agree with the resident's note as stated above. PLan for nail plate removal, mass excision/biopsy.  I have explained the risks, benefits, and alternatives of the procedure to the patient in great detail. The patient voices understanding and all questions have been answered. The patient agrees with to proceed as planned. Consents were performed in clinic.

## 2019-08-01 ENCOUNTER — TELEPHONE (OUTPATIENT)
Dept: ORTHOPEDICS | Facility: CLINIC | Age: 64
End: 2019-08-01

## 2019-08-01 NOTE — TELEPHONE ENCOUNTER
Lm on vm informing pt Dr Tirado will be out of the office the first week in August.  Pt was informed the next available date for surgery is Aug 19.  Pt was advised to call back with another date for surgery.

## 2019-08-02 ENCOUNTER — TELEPHONE (OUTPATIENT)
Dept: ORTHOPEDICS | Facility: CLINIC | Age: 64
End: 2019-08-02

## 2019-08-02 NOTE — TELEPHONE ENCOUNTER
----- Message from Elisa King sent at 8/2/2019  1:10 PM CDT -----  Contact: TOM RED [[id number]]  Name of Who is Calling: TOM RED [[id number]]    What is the request in detail: Patient is requesting to have hand surgery on 8/19/19....Please contact to further discuss and advise      Can the clinic reply by [facility]: Yes     What Number to Call Back if not in [facility]: [phone].

## 2019-08-06 DIAGNOSIS — R22.31 MASS OF RIGHT HAND: Primary | ICD-10-CM

## 2019-08-06 DIAGNOSIS — L60.9 NAIL ABNORMALITY: ICD-10-CM

## 2019-08-15 RX ORDER — ACETAMINOPHEN AND CODEINE PHOSPHATE 300; 30 MG/1; MG/1
1 TABLET ORAL
Qty: 20 TABLET | Refills: 0 | Status: SHIPPED | OUTPATIENT
Start: 2019-08-15 | End: 2019-09-20 | Stop reason: ALTCHOICE

## 2019-08-16 ENCOUNTER — TELEPHONE (OUTPATIENT)
Dept: ORTHOPEDICS | Facility: CLINIC | Age: 64
End: 2019-08-16

## 2019-08-16 NOTE — TELEPHONE ENCOUNTER
Spoke with pt informing him that his arrival time for surgery is 5:15am at [facility].  Pt understood.

## 2019-08-19 ENCOUNTER — HOSPITAL ENCOUNTER (OUTPATIENT)
Facility: OTHER | Age: 64
Discharge: HOME | End: 2019-08-19
Attending: ORTHOPAEDIC SURGERY | Admitting: ORTHOPAEDIC SURGERY
Payer: COMMERCIAL

## 2019-08-19 VITALS
SYSTOLIC BLOOD PRESSURE: 149 MMHG | DIASTOLIC BLOOD PRESSURE: 93 MMHG | HEIGHT: 70 IN | BODY MASS INDEX: 30.06 KG/M2 | RESPIRATION RATE: 16 BRPM | HEART RATE: 68 BPM | TEMPERATURE: 98 F | OXYGEN SATURATION: 97 % | WEIGHT: 210 LBS

## 2019-08-19 DIAGNOSIS — R22.30 MASS OF FINGER: Primary | ICD-10-CM

## 2019-08-19 PROCEDURE — 71000015 HC POSTOP RECOV 1ST HR: Performed by: ORTHOPAEDIC SURGERY

## 2019-08-19 PROCEDURE — 25000003 PHARM REV CODE 250: Performed by: ORTHOPAEDIC SURGERY

## 2019-08-19 PROCEDURE — 88305 TISSUE EXAM BY PATHOLOGIST: CPT | Performed by: PATHOLOGY

## 2019-08-19 PROCEDURE — 36000705 HC OR TIME LEV I EA ADD 15 MIN: Performed by: ORTHOPAEDIC SURGERY

## 2019-08-19 PROCEDURE — 11730 AVULSION NAIL PLATE SIMPLE 1: CPT | Mod: F9,,, | Performed by: ORTHOPAEDIC SURGERY

## 2019-08-19 PROCEDURE — 88305 TISSUE SPECIMEN TO PATHOLOGY - SURGERY: ICD-10-PCS | Mod: 26,,, | Performed by: PATHOLOGY

## 2019-08-19 PROCEDURE — 36000704 HC OR TIME LEV I 1ST 15 MIN: Performed by: ORTHOPAEDIC SURGERY

## 2019-08-19 PROCEDURE — 88305 TISSUE EXAM BY PATHOLOGIST: CPT | Mod: 26,,, | Performed by: PATHOLOGY

## 2019-08-19 PROCEDURE — 11730 PR REMOVAL OF NAIL PLATE: ICD-10-PCS | Mod: F9,,, | Performed by: ORTHOPAEDIC SURGERY

## 2019-08-19 RX ORDER — BUPIVACAINE HYDROCHLORIDE 2.5 MG/ML
INJECTION, SOLUTION EPIDURAL; INFILTRATION; INTRACAUDAL
Status: DISCONTINUED | OUTPATIENT
Start: 2019-08-19 | End: 2019-08-19 | Stop reason: HOSPADM

## 2019-08-19 RX ORDER — KETOROLAC TROMETHAMINE 10 MG/1
10 TABLET, FILM COATED ORAL EVERY 8 HOURS PRN
Qty: 3 TABLET | Refills: 0 | Status: SHIPPED | OUTPATIENT
Start: 2019-08-19 | End: 2020-01-08 | Stop reason: ALTCHOICE

## 2019-08-19 RX ORDER — CEFAZOLIN SODIUM 1 G/3ML
2 INJECTION, POWDER, FOR SOLUTION INTRAMUSCULAR; INTRAVENOUS
Status: DISCONTINUED | OUTPATIENT
Start: 2019-08-19 | End: 2019-08-19 | Stop reason: HOSPADM

## 2019-08-19 RX ORDER — SODIUM CHLORIDE 9 MG/ML
INJECTION, SOLUTION INTRAVENOUS CONTINUOUS
Status: DISCONTINUED | OUTPATIENT
Start: 2019-08-19 | End: 2019-08-19 | Stop reason: HOSPADM

## 2019-08-19 RX ORDER — LIDOCAINE HYDROCHLORIDE 10 MG/ML
INJECTION, SOLUTION EPIDURAL; INFILTRATION; INTRACAUDAL; PERINEURAL
Status: DISCONTINUED | OUTPATIENT
Start: 2019-08-19 | End: 2019-08-19 | Stop reason: HOSPADM

## 2019-08-19 NOTE — INTERVAL H&P NOTE
The patient has been examined and the H&P has been reviewed:    I concur with the findings and no changes have occurred since H&P was written.    Anesthesia/Surgery risks, benefits and alternative options discussed and understood by patient/family.          Active Hospital Problems    Diagnosis  POA    Mass of finger [R22.30]  Yes      Resolved Hospital Problems   No resolved problems to display.

## 2019-08-19 NOTE — PLAN OF CARE
Juan Luis Mcgrath has met all discharge criteria from Phase II. Vital Signs are stable, ambulating  without difficulty. Discharge instructions given, patient verbalized understanding. Discharged from facility via wheelchair in stable condition.

## 2019-08-19 NOTE — OR NURSING
Pt was straight local and has met all discharge criteria.  Waiting for pharmacy to deliver rx to bedside.

## 2019-08-19 NOTE — H&P
HPI:  Juan Luis Mcgrath is a 64 y.o. male who presents to clinic today for a right small finger lesion under his nailbed.  The patient states that the lesion appeared about 2 months ago he said is that there is no history of trauma he denies any pain numbness tingling.  He has seen a dermatologist.    ROS:  Patient denies constitutional symptoms, cardiac symptoms, respiratory symptoms, GI symptoms.  The remainder of the musculoskeletal ROS is included in the HPI.    PE:    Gen:  No acute distress  CV:  Peripherally well-perfused.  Pulses 2+ bilaterally.  Lungs:  Normal respiratory effort.  Abdomen:  Soft, non-tender, non-distended  Head/Neck:  Normocephalic.  Atraumatic. No TTP, AROM and PROM intact without pain  Neuro:  CN intact without deficit, SILT throughout B/L Upper & Lower Extremities    MSK:  RUE:  - there is a small 1 cm lesion subungual of the right small finger it is only mildly painful to palpation there are no other associated lesions with this  - AROM and PROM of the wrist MCP PIP and DIP is intact and without pain  - AIN/PIN/Radial/Median/Ulnar Nerves assessed in isolation without deficit  - SILT throughout  - Radial & Ulnar arteries palpated 2+  - Capillary Refill <3s      Rads:  Mild degenerative changes    A/P:  Juan Luis Mcgrath is a 64 y.o. male  with a right small finger subungual lesion.    Discussed with the patient extensively about the different management options both conservative and surgical options.  To OR for removal of lesion.  I have explained the risks, benefits, and alternatives of the procedure in detail.  The patient voices understanding and all questions have been answered.  The patient agrees to proceed as planned.

## 2019-08-19 NOTE — OR NURSING
Pt arrived in holding with eye glasses and case, ear buds and his cell phone.  Explained that he cannot take his phone to surgery and that I will have someone bring it to his wife or to ACU. When Dr Whiting came to his bedside to augustin his surgical site, Mr Mcgrath  asked her if it was ok and she said yes.

## 2019-08-19 NOTE — BRIEF OP NOTE
[facility]  Brief Operative Note     SUMMARY     Surgery Date: 8/19/2019     Surgeon(s) and Role:     * Luna Tirado MD - Primary    Assisting Surgeon: None    Pre-op Diagnosis:  Mass of right hand [R22.31]  Nail abnormality [L60.9]    Post-op Diagnosis:  Post-Op Diagnosis Codes:     * Mass of right hand [R22.31]     * Nail abnormality [L60.9]    Procedure(s) (LRB):  REMOVAL, NAIL, DIGIT right small finger (Right)  EXCISION, MASS (Right)    Anesthesia: Local    Description of the findings of the procedure: as above    Findings/Key Components: as above    Estimated Blood Loss: * No values recorded between 8/19/2019  7:49 AM and 8/19/2019  8:07 AM *         Specimens:   Specimen (12h ago, onward)    Start     Ordered    Pending  Specimen to Pathology - Surgery  Once      Pending          Discharge Note    SUMMARY     Admit Date: 8/19/2019    Discharge Date and Time:  08/19/2019 8:08 AM    Hospital Course (synopsis of major diagnoses, care, treatment, and services provided during the course of the hospital stay): Pt admitted for outpatient procedure, tolerated well.  Recovered in PACU and was discharged home on day of surgery.       Final Diagnosis: Post-Op Diagnosis Codes:     * Mass of right hand [R22.31]     * Nail abnormality [L60.9]    Disposition: Home or Self Care    Follow Up/Patient Instructions:     Medications:  Reconciled Home Medications:      Medication List      START taking these medications    ketorolac 10 mg tablet  Commonly known as:  TORADOL  Take 1 tablet (10 mg total) by mouth every 8 (eight) hours as needed for Pain.        CONTINUE taking these medications    acetaminophen-codeine 300-30mg 300-30 mg Tab  Commonly known as:  TYLENOL #3  Take 1 tablet by mouth every 4 to 6 hours as needed (moderate to severe pain).     sildenafil 50 MG tablet  Commonly known as:  VIAGRA          Discharge Procedure Orders   Call MD for:  temperature >100.4     Call MD for:  persistent  nausea and vomiting or diarrhea     Call MD for:  severe uncontrolled pain     Call MD for:  redness, tenderness, or signs of infection (pain, swelling, redness, odor or green/yellow discharge around incision site)     Call MD for:  difficulty breathing or increased cough     Call MD for:  severe persistent headache     Call MD for:  worsening rash     Call MD for:  persistent dizziness, light-headedness, or visual disturbances     Call MD for:  increased confusion or weakness     Leave dressing on - Keep it clean, dry, and intact until clinic visit     Follow-up Information     Follow up In 2 weeks.

## 2019-08-20 NOTE — OP NOTE
DATE OF PROCEDURE:  08/19/2019.    SERVICE:  Orthopedics.    ATTENDING SURGEON:  Luna Tirado M.D.    PREOPERATIVE DIAGNOSIS:  Right small finger mass, possibly melanoma.    POSTOPERATIVE DIAGNOSIS:  Right small finger mass.    PROCEDURES:  1.  Excisional biopsy, right nail bed mass.  2.  Nail plate removal.  3.  Splint application, right small finger.    ANESTHESIA:  Local placed by surgeon.    FLUIDS:  None.    BLOOD LOSS:  Minimal.    TOURNIQUET TIME:  Less than 30 minutes.    PACKS AND DRAINS:  None.    IMPLANTS:  Chromic gut suture packet for nail.    SPECIMENS:  Nail plate of the right small finger and nail bed biopsy of the   right small finger.    COMPLICATIONS:  None.    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  He was evaluated in Dermatology and   referred to our clinic shown that he had this enlarging mass and there was   concern for a cell of unknown origin.  Therefore, we discussed biopsy.  Risks   and benefits were explained to the patient in clinic.  Consents performed in   clinic.    PROCEDURE IN DETAIL:  After the correct site was marked with the patient's   participation in the holding area, the patient was brought to the Operating   Room, placed in supine position.  A well-padded nonsterile tourniquet was placed   on the right forearm.  Under sterile conditions, an injection of lidocaine 1%   was injected as a block.  The patient did not desire MAC anesthesia.  The arm   was prepped and draped in normal sterile fashion.  A timeout was conducted for   the correct site and procedure to be indicated.  At this point, the arm was   elevated.  It was not exsanguinated.  The nail plate was removed with Mosquito   hemostat passed off of the back table for specimen.  There was an irregularity   in the nail bed.  The cuticle was incised to confirm that there was not a   melanoma and after the cuticle was incised and  elevated, there did not appear to   be a melanoma.  This did appear to be a distal mass.  Therefore, a biopsy was   performed at this point passed off of the back table for second specimen.  The   area was irrigated with copious amounts of normal saline.  Chromic gut suture   packet was placed under the cuticle after the cuticle was repaired with chromic   stitch.  Sterile dressing was applied.  Tourniquet had been deflated.  The   patient was placed in a well-padded splint, tolerated the procedure well and was   brought to the Recovery area in stable condition.    POSTOPERATIVE PLAN FOR THIS PATIENT:  Keep the dressing clean, dry and intact.    We will see the patient back at 2 weeks' time.  At that point, pathology report   to be given to the patient.      LES/HN  dd: 08/20/2019 07:38:55 (CDT)  td: 08/20/2019 09:23:46 (CDT)  Doc ID   #[id number]  Job ID #[id number]    CC:

## 2019-08-20 NOTE — OP NOTE
[facility]  Surgery Department  Operative Note    SUMMARY     Date of Procedure: 8/19/2019     Procedure: Procedure(s) (LRB):  REMOVAL, NAIL, DIGIT right small finger (Right)  EXCISION, MASS (Right)     Dictation [id number]

## 2019-08-26 ENCOUNTER — OFFICE VISIT (OUTPATIENT)
Dept: OPTOMETRY | Facility: CLINIC | Age: 64
End: 2019-08-26
Payer: COMMERCIAL

## 2019-08-26 ENCOUNTER — OFFICE VISIT (OUTPATIENT)
Dept: OPTOMETRY | Facility: CLINIC | Age: 64
End: 2019-08-26

## 2019-08-26 DIAGNOSIS — Z46.0 FITTING AND ADJUSTMENT OF SPECTACLES AND CONTACT LENSES: Primary | ICD-10-CM

## 2019-08-26 DIAGNOSIS — H52.13 MYOPIA, BILATERAL: Primary | ICD-10-CM

## 2019-08-26 DIAGNOSIS — H25.13 NS (NUCLEAR SCLEROSIS), BILATERAL: ICD-10-CM

## 2019-08-26 DIAGNOSIS — H52.4 PRESBYOPIA: ICD-10-CM

## 2019-08-26 PROCEDURE — 92310 CONTACT LENS FITTING OU: CPT | Mod: CSM,,, | Performed by: OPTOMETRIST

## 2019-08-26 PROCEDURE — 92310 PR CONTACT LENS FITTING (NO CHANGE): ICD-10-PCS | Mod: CSM,,, | Performed by: OPTOMETRIST

## 2019-08-26 PROCEDURE — 92015 DETERMINE REFRACTIVE STATE: CPT | Mod: S$GLB,,, | Performed by: OPTOMETRIST

## 2019-08-26 PROCEDURE — 99999 PR PBB SHADOW E&M-EST. PATIENT-LVL II: ICD-10-PCS | Mod: PBBFAC,,, | Performed by: OPTOMETRIST

## 2019-08-26 PROCEDURE — 99999 PR PBB SHADOW E&M-EST. PATIENT-LVL II: CPT | Mod: PBBFAC,,, | Performed by: OPTOMETRIST

## 2019-08-26 PROCEDURE — 92014 PR EYE EXAM, EST PATIENT,COMPREHESV: ICD-10-PCS | Mod: S$GLB,,, | Performed by: OPTOMETRIST

## 2019-08-26 PROCEDURE — 92015 PR REFRACTION: ICD-10-PCS | Mod: S$GLB,,, | Performed by: OPTOMETRIST

## 2019-08-26 PROCEDURE — 92014 COMPRE OPH EXAM EST PT 1/>: CPT | Mod: S$GLB,,, | Performed by: OPTOMETRIST

## 2019-08-26 NOTE — PROGRESS NOTES
HPI     Mr Mcgrath is here for an annual eye exam and contact lens f/u. Last eye   exam 08/02/17 with Dr. Woods.  Pt c/o of much clearer reading vision without contact lenses. He also c/o   of blurred vision with current contacts. He wears his contact lenses   full-time and occasionally will sleep in them. Pt declines dilation today.     No flashes, floaters, diplopia  No gtts  No eye sx      Last edited by Ana Guy on 8/26/2019  4:18 PM. (History)            Assessment /Plan     For exam results, see Encounter Report.    Myopia, bilateral  Presbyopia   Rx specs   CL fit today: dispensed trials of dailies   Remove nightly, replace daily   OK to order if happy with comfort and vision    NS (nuclear sclerosis), bilateral   Mild, monitor    Good internal ocular health, monitor yearly

## 2019-08-30 ENCOUNTER — TELEPHONE (OUTPATIENT)
Dept: ORTHOPEDICS | Facility: CLINIC | Age: 64
End: 2019-08-30

## 2019-09-02 ENCOUNTER — PATIENT MESSAGE (OUTPATIENT)
Dept: OPTOMETRY | Facility: CLINIC | Age: 64
End: 2019-09-02

## 2019-09-05 DIAGNOSIS — R22.31 MASS OF RIGHT HAND: Primary | ICD-10-CM

## 2019-09-06 ENCOUNTER — ANESTHESIA EVENT (OUTPATIENT)
Dept: SURGERY | Facility: OTHER | Age: 64
End: 2019-09-06
Payer: COMMERCIAL

## 2019-09-06 ENCOUNTER — TELEPHONE (OUTPATIENT)
Dept: ORTHOPEDICS | Facility: CLINIC | Age: 64
End: 2019-09-06

## 2019-09-06 RX ORDER — HYDROCODONE BITARTRATE AND ACETAMINOPHEN 5; 325 MG/1; MG/1
1 TABLET ORAL
Qty: 20 TABLET | Refills: 0 | Status: SHIPPED | OUTPATIENT
Start: 2019-09-06 | End: 2019-09-20 | Stop reason: ALTCHOICE

## 2019-09-06 NOTE — PRE ADMISSION SCREENING
PRE-ADMIT TESTING -  [phone]    [address]  [facility]          Your surgery has been scheduled at [facility]. We are pleased to have the opportunity to serve you. For Further Information please call [phone].    On the day of surgery please report to the Information Desk on the 1st floor.    · CONTACT YOUR PHYSICIAN'S OFFICE THE DAY PRIOR TO YOUR SURGERY TO OBTAIN YOUR ARRIVAL TIME.     · The evening before surgery do not eat anything after 9 p.m. ( this includes hard candy, chewing gum and mints).  You may only have GATORADE, POWERADE AND WATER  from 9 p.m. until you leave your home.   DO NOT DRINK ANY LIQUIDS ON THE WAY TO THE HOSPITAL.      SPECIAL MEDICATION INSTRUCTIONS: TAKE medications checked off by the Anesthesiologist on your Medication List.    Angiogram Patients: Take medications as instructed by your physician, including aspirin.     Surgery Patients:    If you take ASPIRIN - Your PHYSICIAN/SURGEON will need to inform you IF/OR when you need to stop taking aspirin prior to your surgery.     Do Not take any medications containing IBUPROFEN.  Do Not Wear any make-up or dark nail polish   (especially eye make-up) to surgery. If you come to surgery with makeup on you will be required to remove the makeup or nail polish.    Do not shave your surgical area at least 5 days prior to your surgery. The surgical prep will be performed at the hospital according to Infection Control regulations.    Leave all valuables at home.   Do Not wear any jewelry or watches, including any metal in body piercings. Jewelry must be removed prior to coming to the hospital.  There is a possibility that rings that are unable to be removed may be cut off if they are on the surgical extremity.    Contact Lens must be removed before surgery. Either do not wear the contact lens or bring a case and solution for storage.  Please bring a container for eyeglasses or dentures as required.  Bring  any paperwork your physician has provided, such as consent forms,  history and physicals, doctor's orders, etc.   Bring comfortable clothes that are loose fitting to wear upon discharge. Take into consideration the type of surgery being performed.  Maintain your diet as advised per your physician the day prior to surgery.      Adequate rest the night before surgery is advised.   Park in the Parking lot behind the hospital or in the [facility] across the street from the parking lot. Parking is complimentary.  If you will be discharged the same day as your procedure, please arrange for a responsible adult to drive you home or to accompany you if traveling by taxi.   YOU WILL NOT BE PERMITTED TO DRIVE OR TO LEAVE THE HOSPITAL ALONE AFTER SURGERY.   It is strongly recommended that you arrange for someone to remain with you for the first 24 hrs following your surgery.    The Surgeon will speak to your family/visitor after your surgery regarding the outcome of your surgery and post op care.  The Surgeon may speak to you after your surgery, but there is a possibility you may not remember the details.  Please check with your family members regarding the conversation with the Surgeon.    We strongly recommend whoever is bringing you home be present for discharge instructions.  This will ensure a thorough understanding for your post op home care.      Thank you for your cooperation.  The Staff of [facility].                Bathing Instructions with Hibiclens     Shower the evening before and morning of your procedure with Hibiclens:   Wash your face with water and your regular face wash/soap   Apply Hibiclens directly on your skin or on a wet washcloth and wash gently. When showering: Move away from the shower stream when applying Hibiclens to avoid rinsing off too soon.   Rinse thoroughly with warm water   Do not dilute Hibiclens         Dry off as usual, do not use any deodorant,  powder, body lotions, perfume, after shave or cologne.

## 2019-09-06 NOTE — PRE ADMISSION SCREENING
Pre admit phone call completed.    Instructions given to patient about NPO status as follows:     The evening before surgery do not eat anything after 9 p.m. ( this includes hard candy, chewing gum and mints).  You may only have GATORADE, POWERADE AND WATER from 9 p.m. until you leave your home. DO NOT  DRINK ANY LIQUIDS ON THE WAY TO THE HOSPITAL.      Patient was also instructed on the below information:    Park in the Parking lot behind the hospital or in the [employer] [facility] across the street from the parking lot.  Parking is complimentary.  If you will be discharged the same day as your procedure, please arrange for a responsible adult to drive you home or  to accompany you if traveling by taxi.  YOU WILL NOT BE PERMITTED TO DRIVE OR TO LEAVE THE HOSPITAL ALONE AFTER SURGERY.  It is strongly recommended that you arrange for someone to remain with you for the first 24 hrs following your surgery.    Patient verbalized understanding of above instructions.

## 2019-09-09 ENCOUNTER — HOSPITAL ENCOUNTER (OUTPATIENT)
Facility: OTHER | Age: 64
Discharge: HOME | End: 2019-09-09
Attending: ORTHOPAEDIC SURGERY | Admitting: ORTHOPAEDIC SURGERY
Payer: COMMERCIAL

## 2019-09-09 ENCOUNTER — ANESTHESIA (OUTPATIENT)
Dept: SURGERY | Facility: OTHER | Age: 64
End: 2019-09-09
Payer: COMMERCIAL

## 2019-09-09 VITALS
HEIGHT: 70 IN | HEART RATE: 81 BPM | BODY MASS INDEX: 28.63 KG/M2 | WEIGHT: 200 LBS | RESPIRATION RATE: 16 BRPM | DIASTOLIC BLOOD PRESSURE: 90 MMHG | SYSTOLIC BLOOD PRESSURE: 140 MMHG | TEMPERATURE: 98 F | OXYGEN SATURATION: 98 %

## 2019-09-09 DIAGNOSIS — R22.30 MASS OF FINGER: Primary | ICD-10-CM

## 2019-09-09 LAB
GRAM STN SPEC: NORMAL
GRAM STN SPEC: NORMAL

## 2019-09-09 PROCEDURE — 25000003 PHARM REV CODE 250: Performed by: STUDENT IN AN ORGANIZED HEALTH CARE EDUCATION/TRAINING PROGRAM

## 2019-09-09 PROCEDURE — 87070 CULTURE OTHR SPECIMN AEROBIC: CPT

## 2019-09-09 PROCEDURE — 63600175 PHARM REV CODE 636 W HCPCS: Performed by: NURSE ANESTHETIST, CERTIFIED REGISTERED

## 2019-09-09 PROCEDURE — 36000707: Performed by: ORTHOPAEDIC SURGERY

## 2019-09-09 PROCEDURE — 87206 SMEAR FLUORESCENT/ACID STAI: CPT

## 2019-09-09 PROCEDURE — 88305 TISSUE EXAM BY PATHOLOGIST: CPT | Performed by: PATHOLOGY

## 2019-09-09 PROCEDURE — 88331 TISSUE SPECIMEN TO PATHOLOGY - SURGERY: ICD-10-PCS | Mod: 26,,, | Performed by: PATHOLOGY

## 2019-09-09 PROCEDURE — 87205 SMEAR GRAM STAIN: CPT

## 2019-09-09 PROCEDURE — 87116 MYCOBACTERIA CULTURE: CPT

## 2019-09-09 PROCEDURE — 87102 FUNGUS ISOLATION CULTURE: CPT

## 2019-09-09 PROCEDURE — 26117 RAD RESECT HAND TUMOR < 3 CM: CPT | Mod: F9,,, | Performed by: ORTHOPAEDIC SURGERY

## 2019-09-09 PROCEDURE — 71000016 HC POSTOP RECOV ADDL HR: Performed by: ORTHOPAEDIC SURGERY

## 2019-09-09 PROCEDURE — 88305 TISSUE EXAM BY PATHOLOGIST: CPT | Mod: 26,,, | Performed by: PATHOLOGY

## 2019-09-09 PROCEDURE — 87075 CULTR BACTERIA EXCEPT BLOOD: CPT

## 2019-09-09 PROCEDURE — 88305 TISSUE SPECIMEN TO PATHOLOGY - SURGERY: ICD-10-PCS | Mod: 26,,, | Performed by: PATHOLOGY

## 2019-09-09 PROCEDURE — 26117: ICD-10-PCS | Mod: F9,,, | Performed by: ORTHOPAEDIC SURGERY

## 2019-09-09 PROCEDURE — 25000003 PHARM REV CODE 250: Performed by: ANESTHESIOLOGY

## 2019-09-09 PROCEDURE — 37000008 HC ANESTHESIA 1ST 15 MINUTES: Performed by: ORTHOPAEDIC SURGERY

## 2019-09-09 PROCEDURE — 71000015 HC POSTOP RECOV 1ST HR: Performed by: ORTHOPAEDIC SURGERY

## 2019-09-09 PROCEDURE — 37000009 HC ANESTHESIA EA ADD 15 MINS: Performed by: ORTHOPAEDIC SURGERY

## 2019-09-09 PROCEDURE — 63600175 PHARM REV CODE 636 W HCPCS: Performed by: ANESTHESIOLOGY

## 2019-09-09 PROCEDURE — 88331 PATH CONSLTJ SURG 1 BLK 1SPC: CPT | Mod: 26,,, | Performed by: PATHOLOGY

## 2019-09-09 PROCEDURE — 36000706: Performed by: ORTHOPAEDIC SURGERY

## 2019-09-09 RX ORDER — ROPIVACAINE HYDROCHLORIDE 5 MG/ML
INJECTION, SOLUTION EPIDURAL; INFILTRATION; PERINEURAL
Status: COMPLETED | OUTPATIENT
Start: 2019-09-09 | End: 2019-09-09

## 2019-09-09 RX ORDER — HYDROMORPHONE HYDROCHLORIDE 2 MG/ML
0.4 INJECTION, SOLUTION INTRAMUSCULAR; INTRAVENOUS; SUBCUTANEOUS EVERY 5 MIN PRN
Status: DISCONTINUED | OUTPATIENT
Start: 2019-09-09 | End: 2019-09-09 | Stop reason: HOSPADM

## 2019-09-09 RX ORDER — OXYCODONE HYDROCHLORIDE 5 MG/1
10 TABLET ORAL EVERY 4 HOURS PRN
Status: DISCONTINUED | OUTPATIENT
Start: 2019-09-09 | End: 2019-09-09 | Stop reason: HOSPADM

## 2019-09-09 RX ORDER — MIDAZOLAM HYDROCHLORIDE 1 MG/ML
5 INJECTION INTRAMUSCULAR; INTRAVENOUS ONCE AS NEEDED
Status: COMPLETED | OUTPATIENT
Start: 2019-09-09 | End: 2019-09-09

## 2019-09-09 RX ORDER — ONDANSETRON 2 MG/ML
4 INJECTION INTRAMUSCULAR; INTRAVENOUS DAILY PRN
Status: DISCONTINUED | OUTPATIENT
Start: 2019-09-09 | End: 2019-09-09 | Stop reason: HOSPADM

## 2019-09-09 RX ORDER — ACETAMINOPHEN 325 MG/1
650 TABLET ORAL EVERY 4 HOURS PRN
Status: DISCONTINUED | OUTPATIENT
Start: 2019-09-09 | End: 2019-09-09 | Stop reason: HOSPADM

## 2019-09-09 RX ORDER — FENTANYL CITRATE 50 UG/ML
100 INJECTION, SOLUTION INTRAMUSCULAR; INTRAVENOUS EVERY 5 MIN PRN
Status: DISCONTINUED | OUTPATIENT
Start: 2019-09-09 | End: 2019-09-09 | Stop reason: HOSPADM

## 2019-09-09 RX ORDER — PROPOFOL 10 MG/ML
VIAL (ML) INTRAVENOUS
Status: DISCONTINUED | OUTPATIENT
Start: 2019-09-09 | End: 2019-09-09

## 2019-09-09 RX ORDER — MUPIROCIN 20 MG/G
OINTMENT TOPICAL
Status: DISCONTINUED | OUTPATIENT
Start: 2019-09-09 | End: 2019-09-09 | Stop reason: HOSPADM

## 2019-09-09 RX ORDER — DIPHENHYDRAMINE HYDROCHLORIDE 50 MG/ML
25 INJECTION INTRAMUSCULAR; INTRAVENOUS EVERY 6 HOURS PRN
Status: DISCONTINUED | OUTPATIENT
Start: 2019-09-09 | End: 2019-09-09 | Stop reason: HOSPADM

## 2019-09-09 RX ORDER — OXYCODONE HYDROCHLORIDE 5 MG/1
5 TABLET ORAL
Status: DISCONTINUED | OUTPATIENT
Start: 2019-09-09 | End: 2019-09-09 | Stop reason: HOSPADM

## 2019-09-09 RX ORDER — SODIUM CHLORIDE 0.9 % (FLUSH) 0.9 %
3 SYRINGE (ML) INJECTION
Status: DISCONTINUED | OUTPATIENT
Start: 2019-09-09 | End: 2019-09-09 | Stop reason: HOSPADM

## 2019-09-09 RX ORDER — CEFAZOLIN SODIUM 1 G/3ML
2 INJECTION, POWDER, FOR SOLUTION INTRAMUSCULAR; INTRAVENOUS
Status: DISCONTINUED | OUTPATIENT
Start: 2019-09-09 | End: 2019-09-09 | Stop reason: HOSPADM

## 2019-09-09 RX ORDER — SODIUM CHLORIDE 9 MG/ML
INJECTION, SOLUTION INTRAVENOUS CONTINUOUS
Status: DISCONTINUED | OUTPATIENT
Start: 2019-09-09 | End: 2019-09-09 | Stop reason: HOSPADM

## 2019-09-09 RX ORDER — LIDOCAINE HCL/EPINEPHRINE/PF 2%-1:200K
VIAL (ML) INJECTION
Status: COMPLETED | OUTPATIENT
Start: 2019-09-09 | End: 2019-09-09

## 2019-09-09 RX ORDER — SODIUM CHLORIDE, SODIUM LACTATE, POTASSIUM CHLORIDE, CALCIUM CHLORIDE 600; 310; 30; 20 MG/100ML; MG/100ML; MG/100ML; MG/100ML
INJECTION, SOLUTION INTRAVENOUS CONTINUOUS PRN
Status: DISCONTINUED | OUTPATIENT
Start: 2019-09-09 | End: 2019-09-09

## 2019-09-09 RX ORDER — LIDOCAINE HCL/PF 100 MG/5ML
SYRINGE (ML) INTRAVENOUS
Status: DISCONTINUED | OUTPATIENT
Start: 2019-09-09 | End: 2019-09-09

## 2019-09-09 RX ORDER — MEPERIDINE HYDROCHLORIDE 25 MG/ML
12.5 INJECTION INTRAMUSCULAR; INTRAVENOUS; SUBCUTANEOUS ONCE AS NEEDED
Status: DISCONTINUED | OUTPATIENT
Start: 2019-09-09 | End: 2019-09-09 | Stop reason: HOSPADM

## 2019-09-09 RX ORDER — ONDANSETRON 2 MG/ML
4 INJECTION INTRAMUSCULAR; INTRAVENOUS EVERY 12 HOURS PRN
Status: DISCONTINUED | OUTPATIENT
Start: 2019-09-09 | End: 2019-09-09 | Stop reason: HOSPADM

## 2019-09-09 RX ORDER — METOCLOPRAMIDE HYDROCHLORIDE 5 MG/ML
5 INJECTION INTRAMUSCULAR; INTRAVENOUS EVERY 6 HOURS PRN
Status: DISCONTINUED | OUTPATIENT
Start: 2019-09-09 | End: 2019-09-09 | Stop reason: HOSPADM

## 2019-09-09 RX ORDER — HYDROCODONE BITARTRATE AND ACETAMINOPHEN 5; 325 MG/1; MG/1
1 TABLET ORAL EVERY 4 HOURS PRN
Status: DISCONTINUED | OUTPATIENT
Start: 2019-09-09 | End: 2019-09-09 | Stop reason: HOSPADM

## 2019-09-09 RX ORDER — ONDANSETRON HYDROCHLORIDE 2 MG/ML
INJECTION, SOLUTION INTRAMUSCULAR; INTRAVENOUS
Status: DISCONTINUED | OUTPATIENT
Start: 2019-09-09 | End: 2019-09-09

## 2019-09-09 RX ORDER — PROPOFOL 10 MG/ML
VIAL (ML) INTRAVENOUS CONTINUOUS PRN
Status: DISCONTINUED | OUTPATIENT
Start: 2019-09-09 | End: 2019-09-09

## 2019-09-09 RX ORDER — DEXTROMETHORPHAN HYDROBROMIDE, GUAIFENESIN 5; 100 MG/5ML; MG/5ML
650 LIQUID ORAL EVERY 8 HOURS
COMMUNITY
End: 2020-01-08 | Stop reason: ALTCHOICE

## 2019-09-09 RX ADMIN — PROPOFOL 50 MG: 10 INJECTION, EMULSION INTRAVENOUS at 09:09

## 2019-09-09 RX ADMIN — PROPOFOL 125 MCG/KG/MIN: 10 INJECTION, EMULSION INTRAVENOUS at 09:09

## 2019-09-09 RX ADMIN — MUPIROCIN: 20 OINTMENT TOPICAL at 08:09

## 2019-09-09 RX ADMIN — LIDOCAINE HYDROCHLORIDE,EPINEPHRINE BITARTRATE 20 ML: 20; .005 INJECTION, SOLUTION EPIDURAL; INFILTRATION; INTRACAUDAL; PERINEURAL at 09:09

## 2019-09-09 RX ADMIN — LIDOCAINE HYDROCHLORIDE 50 MG: 20 INJECTION, SOLUTION INTRAVENOUS at 09:09

## 2019-09-09 RX ADMIN — FENTANYL CITRATE 100 MCG: 50 INJECTION, SOLUTION INTRAMUSCULAR; INTRAVENOUS at 09:09

## 2019-09-09 RX ADMIN — ROPIVACAINE HYDROCHLORIDE 20 ML: 5 INJECTION, SOLUTION EPIDURAL; INFILTRATION; PERINEURAL at 09:09

## 2019-09-09 RX ADMIN — ONDANSETRON 4 MG: 2 INJECTION, SOLUTION INTRAMUSCULAR; INTRAVENOUS at 09:09

## 2019-09-09 RX ADMIN — SODIUM CHLORIDE, SODIUM LACTATE, POTASSIUM CHLORIDE, AND CALCIUM CHLORIDE: 600; 310; 30; 20 INJECTION, SOLUTION INTRAVENOUS at 09:09

## 2019-09-09 RX ADMIN — MIDAZOLAM HYDROCHLORIDE 4 MG: 1 INJECTION, SOLUTION INTRAMUSCULAR; INTRAVENOUS at 09:09

## 2019-09-09 NOTE — PLAN OF CARE
Aníbal Alcides has met all discharge criteria from Phase II. Vital Signs are stable, ambulating  without difficulty. Discharge instructions given, patient verbalized understanding. Discharged from facility via wheelchair in stable condition.

## 2019-09-09 NOTE — H&P
HPI:  Juan Luis Mcgrath is a 64 y.o. male who presents to clinic today for a right small finger lesion under his nailbed.  The patient states that the lesion appeared about 2 months ago he said is that there is no history of trauma he denies any pain numbness tingling.  He has seen a dermatologist.    ROS:  Patient denies constitutional symptoms, cardiac symptoms, respiratory symptoms, GI symptoms.  The remainder of the musculoskeletal ROS is included in the HPI.    PE:    Gen:  No acute distress  CV:  Peripherally well-perfused.  Pulses 2+ bilaterally.  Lungs:  Normal respiratory effort.  Abdomen:  Soft, non-tender, non-distended  Head/Neck:  Normocephalic.  Atraumatic. No TTP, AROM and PROM intact without pain  Neuro:  CN intact without deficit, SILT throughout B/L Upper & Lower Extremities    MSK:  RUE:  - there is a small 1 cm lesion subungual of the right small finger it is only mildly painful to palpation there are no other associated lesions with this  - AROM and PROM of the wrist MCP PIP and DIP is intact and without pain  - AIN/PIN/Radial/Median/Ulnar Nerves assessed in isolation without deficit  - SILT throughout  - Radial & Ulnar arteries palpated 2+  - Capillary Refill <3s      Rads:  Mild degenerative changes    A/P:  Juan Luis Mcgrath is a 64 y.o. male who presents to clinic today with a right small finger subungual lesion.    Discussed with the patient extensively about the different management options both conservative and surgical options.  We discussed the possibility that this could be a verruca versus some other neoplasm per Dermatology note likely not malignant.  We will plan for removal and biopsy.  I have explained the risks, benefits, and alternatives of the procedure in detail.  The patient voices understanding and all questions have been answered.  The patient agrees to proceed as planned.

## 2019-09-09 NOTE — DISCHARGE INSTRUCTIONS
Anesthesia: Monitored Anesthesia Care (MAC)    Anesthesia Safety  · Have an adult family member or friend drive you home after the procedure.  · For the first 24 hours after your surgery:  ¨ Do not drive or use heavy equipment.  ¨ Do not make important decisions or sign documents.  ¨ Avoid alcohol.  ¨ Have someone stay with you, if possible. They can watch for problems and help keep you safe.    PLEASE FOLLOW ANY OTHER INSTRUCTIONS PROVIDED TO YOU BY DR. HA!

## 2019-09-09 NOTE — OR NURSING
"Patient dressed and sitting in chair.  All discharge criteria met.  Patient states he "needs a minute" before leaving.    "

## 2019-09-09 NOTE — BRIEF OP NOTE
[facility]  Brief Operative Note     SUMMARY     Surgery Date: 9/9/2019     Surgeon(s) and Role:     * Luna Tirado MD - Primary    Assisting Surgeon: None    Pre-op Diagnosis:  Mass of right hand [R22.31]    Post-op Diagnosis:  Post-Op Diagnosis Codes:     * Mass of right hand [R22.31]    Procedure(s) (LRB):  EXCISION, MASS, FINGER small finger resection (Right)    Anesthesia: Regional    Description of the findings of the procedure: see op note    Findings/Key Components: see op note    Estimated Blood Loss: * No values recorded between 9/9/2019 10:05 AM and 9/9/2019 12:30 PM *         Specimens:   Specimen (12h ago, onward)    Start     Ordered    09/09/19 1219  Specimen to Pathology - Surgery  Once     Comments:  5. Right small finger perionychium, stitch at distal (sent as frozen)     Start Status     09/09/19 1219 Collected (09/09/19 1230) Order ID: [id number]       09/09/19 1228    09/09/19 1214  Specimen to Pathology - Surgery  Once     Comments:  4. RIGHT SMALL FINGER NAIL BED, STITCH AT DISTAL (SENT AS FROZEN)     Start Status     09/09/19 1214 Collected (09/09/19 1213) Order ID: [id number]       09/09/19 1213    09/09/19 1130  Specimen to Pathology - Surgery  Once     Comments:  3. RIGHT SMALL FINGER NAIL BED (SENT AS FROZEN)     Start Status     09/09/19 1130 Collected (09/09/19 1130) Order ID: [id number]       09/09/19 1130    09/09/19 1104  Specimen to Pathology - Surgery  Once     Comments:  2. Right small finger nail bed, stitch at distal (sent as frozen)     Start Status     09/09/19 1104 Collected (09/09/19 1104) Order ID: [id number]       09/09/19 1104    09/09/19 1012  Specimen to Pathology - Surgery  Once     Comments:  1.  Right small finger nail bed, stitch @ distalPrevious hx: squamous cell carcinoma of nail bed ( sent for frozen)     Start Status     09/09/19 1012 Collected (09/09/19 1012) Order ID: [id number]       09/09/19 1012          Discharge Note    SUMMARY      Admit Date: 9/9/2019    Discharge Date and Time: 09/09/2019     Hospital Course (synopsis of major diagnoses, care, treatment, and services provided during the course of the hospital stay): Hospital Course:  On 9/9/2019, the patient arrived to pre-op area for proper pre-operative management.  Upon completion of pre-operative preparation, the patient was taken back to the operative theatre.  A Mass resection right small finger was performed without complication and the patient was transported to the post anesthesia care unit in stable condition. The patient suffered minimal blood loss and electrolyte imbalances during the procedure, which were correct accordingly if neccessary. After appropriate recovery from the anaesthetic agents used during the surgery the patient was discharged home.        Final Diagnosis: Post-Op Diagnosis Codes:     * Mass of right hand [R22.31]    Disposition: Home or Self Care    Follow Up/Patient Instructions:     Medications:  Reconciled Home Medications:      Medication List      CONTINUE taking these medications    acetaminophen 650 MG Tbsr  Commonly known as:  TYLENOL  Take 650 mg by mouth every 8 (eight) hours.     acetaminophen-codeine 300-30mg 300-30 mg Tab  Commonly known as:  TYLENOL #3  Take 1 tablet by mouth every 4 to 6 hours as needed (moderate to severe pain).     HYDROcodone-acetaminophen 5-325 mg per tablet  Commonly known as:  NORCO  Take 1 tablet by mouth every 4 to 6 hours as needed for Pain (moderate to severe).     ketorolac 10 mg tablet  Commonly known as:  TORADOL  Take 1 tablet (10 mg total) by mouth every 8 (eight) hours as needed for Pain.     sildenafil 50 MG tablet  Commonly known as:  VIAGRA          Discharge Procedure Orders   Diet general     Sponge bath only until clinic visit     Lifting restrictions   Order Comments: No lifting operative extremity     No driving, operating heavy equipment or signing legal documents while taking pain medication     Call  MD for:  temperature >100.4     Call MD for:  persistent nausea and vomiting     Call MD for:  severe uncontrolled pain     Call MD for:  difficulty breathing, headache or visual disturbances     Call MD for:  redness, tenderness, or signs of infection (pain, swelling, redness, odor or green/yellow discharge around incision site)     Call MD for:  hives     Call MD for:  persistent dizziness or light-headedness     Call MD for:  extreme fatigue     Leave dressing on - Keep it clean, dry, and intact until clinic visit

## 2019-09-09 NOTE — ANESTHESIA PROCEDURE NOTES
Right Axillary    Patient location during procedure: holding area    Reason for block: primary anesthetic   Diagnosis: Right 5th digit tumor   Timeout: 9/9/2019 9:19 AM   End time: 9/9/2019 9:29 AM    Staffing  Authorizing Provider: Drew Morrow MD  Performing Provider: Drew Morrow MD    Preanesthetic Checklist  Completed: patient identified, site marked, surgical consent, pre-op evaluation, timeout performed, IV checked, risks and benefits discussed and monitors and equipment checked  Peripheral Block  Patient position: supine  Prep: ChloraPrep and site prepped and draped  Patient monitoring: heart rate, cardiac monitor, continuous pulse ox and frequent blood pressure checks  Block type: infraclavicular  Laterality: right  Injection technique: single shot  Needle  Needle type: Echogenic   Needle gauge: 21 G  Needle length: 4 in  Needle localization: anatomical landmarks and ultrasound guidance   -ultrasound image captured on disc.  Assessment  Injection assessment: negative aspiration, negative parasthesia and local visualized surrounding nerve  Paresthesia pain: none  Heart rate change: no  Slow fractionated injection: yes

## 2019-09-09 NOTE — OR NURSING
"Holding called to say that patient wants his wallet and phone locked up/"safe". Put clothing/belongings together and taped up the top. Will leave in office since security cannot lock up belongings without patient present.  "

## 2019-09-09 NOTE — ANESTHESIA POSTPROCEDURE EVALUATION
Anesthesia Post Evaluation    Patient: Juan Luis Mcgrath    Procedure(s) Performed: Procedure(s) (LRB):  EXCISION, MASS, FINGER small finger resection (Right)    Final Anesthesia Type: regional  Patient location during evaluation: [facility]  Patient participation: Yes- Able to Participate  Level of consciousness: awake and alert, awake and oriented  Post-procedure vital signs: reviewed and stable  Pain management: adequate  Airway patency: patent  PONV status at discharge: No PONV  Anesthetic complications: no      Cardiovascular status: blood pressure returned to baseline, hemodynamically stable and stable  Respiratory status: spontaneous ventilation, unassisted and room air  Hydration status: euvolemic  Follow-up not needed.          Vitals Value Taken Time   /84 9/9/2019  8:21 AM   Temp 36.4 °C (97.6 °F) 9/9/2019  8:21 AM   Pulse 73 9/9/2019  8:21 AM   Resp 18 9/9/2019  8:21 AM   SpO2 97 % 9/9/2019  8:21 AM         No case tracking events are documented in the log.      Pain/Ana Score: No data recorded

## 2019-09-09 NOTE — ANESTHESIA PREPROCEDURE EVALUATION
09/09/2019  Juan Luis Mcgrath is a 64 y.o., male.    Anesthesia Evaluation    I have reviewed the Patient Summary Reports.    I have reviewed the Nursing Notes.   I have reviewed the Medications.     Review of Systems  Anesthesia Hx:  No problems with previous Anesthesia  Denies Family Hx of Anesthesia complications.   Denies Personal Hx of Anesthesia complications.   Social:  Non-Smoker        Physical Exam  General:  Well nourished    Airway/Jaw/Neck:  Airway Findings: Mouth Opening: Normal Mallampati: II      Dental:  Dental Findings: In tact        Mental Status:  Mental Status Findings:  Cooperative, Alert and Oriented         Anesthesia Plan  Type of Anesthesia, risks & benefits discussed:  Anesthesia Type:  MAC  Patient's Preference:   Intra-op Monitoring Plan: standard ASA monitors  Intra-op Monitoring Plan Comments:   Post Op Pain Control Plan:   Post Op Pain Control Plan Comments:   Induction:   IV  Beta Blocker:         Informed Consent: Patient understands risks and agrees with Anesthesia plan.  Questions answered. Anesthesia consent signed with patient.  ASA Score: 1     Day of Surgery Review of History & Physical:    H&P update referred to the surgeon.         Ready For Surgery From Anesthesia Perspective.

## 2019-09-12 DIAGNOSIS — C44.622: Primary | ICD-10-CM

## 2019-09-12 LAB — BACTERIA SPEC AEROBE CULT: NO GROWTH

## 2019-09-12 NOTE — OP NOTE
DATE OF PROCEDURE:  09/09/2019    SERVICE:  Orthopedics.    ATTENDING SURGEON:  Luna Tirado M.D.    RESIDENT SURGEON:  Don Olivares M.D. (RES)    PREOPERATIVE DIAGNOSIS:  Right small finger squamous cell carcinoma.    POSTOPERATIVE DIAGNOSIS:  Right small finger squamous cell carcinoma.    PROCEDURE PERFORMED:  Excisional biopsy, right small finger squamous cell   carcinoma measuring 2 x 1, Splint application.    ANESTHESIA:  Regional MAC.    FLUIDS:  Lactated Ringer's.    BLOOD LOSS:  None.  No blood was given.    PACKS AND DRAINS:  None.    IMPLANTS:  None.    SPECIMENS:  Four biopsies of the right small finger.    COMPLICATIONS:  None.    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  It returned as squamous cell carcinoma.  Therefore,   an excisional biopsy was performed.  Risks and benefits were explained to the   patient in clinic.  Consents performed in clinic.    PROCEDURE IN DETAIL:  After the correct site was marked with the patient's   participation in the holding area, the patient underwent regional anesthesia,   was brought to the Operating Room, placed in supine position, and underwent MAC   anesthesia.  A well-padded nonsterile tourniquet was placed on the right upper   extremity.  The right upper extremity was prepped and draped in normal sterile   fashion.  A timeout was conducted for the correct site and procedure to be   indicated.  IV antibiotics were given to the patient preoperatively.  The arm   was elevated, it was not exsanguinated.  Tourniquet was insufflated to 250 mmHg.    After the timeout had been conducted, a section of the area that was involved   was removed and sent to Pathology for fresh frozen section.  Per pathologist, it   was too dry and could not do a fresh frozen section.  We then had the   pathologist return to the Operating  Room and visualized the section that we were   taken.  The pathologist took the second section and she said that there was   still carcinoma present.  We then took a third and finally a fourth section,   very large margins.  At this time, the section went down to bone and did violate   completely the nail bed as well as the surrounding paronychia.  At this point   due to the uncertainty of whether or not the margins were clear, the pathologist   felt a permanent section would be better and therefore, the area was irrigated   with copious amounts of normal saline.  Sutures closed the defect.  Of note, the   patient will have a nail plate deformity in the future as well as the nail bed.    The patient may require an amputation of this fingertip in the future based on   the return of the permanent section.  The patient was placed in a well-padded   splint after the sterile dressing was applied and taken to Recovery area in   stable condition.    POSTOPERATIVE PLAN FOR THIS PATIENT:  The patient will be referred to Heme/Onc   for followup and evaluation.  We will await the permanent sections for further   treatment of his finger.  This was discussed in length with the patient   postoperatively.      LES/HN  dd: 09/11/2019 19:13:16 (CDT)  td: 09/11/2019 19:50:41 (CDT)  Doc ID   #[id number]  Job ID #[id number]    CC:

## 2019-09-13 NOTE — OP NOTE
DATE OF PROCEDURE:  09/09/2019    SERVICE:  Orthopedics.    ATTENDING SURGEON:  Luna Tirado M.D.    RESIDENT SURGEON:  Don Olivares M.D. (RES)    PREOPERATIVE DIAGNOSIS:  Right small finger squamous cell carcinoma.    POSTOPERATIVE DIAGNOSIS:  Right small finger squamous cell carcinoma.    PROCEDURE PERFORMED:  Excisional biopsy, right small finger squamous cell   carcinoma measuring 2 x 1, Splint application.    ANESTHESIA:  Regional MAC.    FLUIDS:  Lactated Ringer's.    BLOOD LOSS:  None.  No blood was given.    PACKS AND DRAINS:  None.    IMPLANTS:  None.    SPECIMENS:  Four biopsies of the right small finger.    COMPLICATIONS:  None.    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  It returned as squamous cell carcinoma.  Therefore,   an excisional biopsy was performed.  Risks and benefits were explained to the   patient in clinic.  Consents performed in clinic.    PROCEDURE IN DETAIL:  After the correct site was marked with the patient's   participation in the holding area, the patient underwent regional anesthesia,   was brought to the Operating Room, placed in supine position, and underwent MAC   anesthesia.  A well-padded nonsterile tourniquet was placed on the right upper   extremity.  The right upper extremity was prepped and draped in normal sterile   fashion.  A timeout was conducted for the correct site and procedure to be   indicated.  IV antibiotics were given to the patient preoperatively.  The arm   was elevated, it was not exsanguinated.  Tourniquet was insufflated to 250 mmHg.    After the timeout had been conducted, a section of the area that was involved   was removed and sent to Pathology for fresh frozen section.  Per pathologist, it   was too dry and could not do a fresh frozen section.  We then had the   pathologist return to the Operating  Room and visualized the section that we were   taken.  The pathologist took the second section and she said that there was   still carcinoma present.  We then took a third and finally a fourth section,   very large margins.  At this time, the section went down to bone and did violate   completely the nail bed as well as the surrounding paronychia.  At this point   due to the uncertainty of whether or not the margins were clear, the pathologist   felt a permanent section would be better and therefore, the area was irrigated   with copious amounts of normal saline.  Sutures closed the defect.  Of note, the   patient will have a nail plate deformity in the future as well as the nail bed.    The patient may require an amputation of this fingertip in the future based on   the return of the permanent section.  The patient was placed in a well-padded   splint after the sterile dressing was applied and taken to Recovery area in   stable condition.    POSTOPERATIVE PLAN FOR THIS PATIENT:  The patient will be referred to Heme/Onc   for followup and evaluation.  We will await the permanent sections for further   treatment of his finger.  This was discussed in length with the patient   postoperatively.      LES/HN  dd: 09/11/2019 19:13:16 (CDT)  td: 09/11/2019 19:50:41 (CDT)  Doc ID   #[id number]  Job ID #[id number]    CC:

## 2019-09-16 ENCOUNTER — TELEPHONE (OUTPATIENT)
Dept: ORTHOPEDICS | Facility: CLINIC | Age: 64
End: 2019-09-16

## 2019-09-16 LAB — BACTERIA SPEC ANAEROBE CULT: NORMAL

## 2019-09-17 ENCOUNTER — TELEPHONE (OUTPATIENT)
Dept: HEMATOLOGY/ONCOLOGY | Facility: CLINIC | Age: 64
End: 2019-09-17

## 2019-09-18 ENCOUNTER — LAB VISIT (OUTPATIENT)
Dept: LAB | Facility: HOSPITAL | Age: 64
End: 2019-09-18
Payer: COMMERCIAL

## 2019-09-18 ENCOUNTER — TELEPHONE (OUTPATIENT)
Dept: HEMATOLOGY/ONCOLOGY | Facility: CLINIC | Age: 64
End: 2019-09-18

## 2019-09-18 DIAGNOSIS — C44.622: ICD-10-CM

## 2019-09-18 LAB
CREAT SERPL-MCNC: 1.2 MG/DL (ref 0.5–1.4)
EST. GFR  (AFRICAN AMERICAN): >60 ML/MIN/1.73 M^2
EST. GFR  (NON AFRICAN AMERICAN): >60 ML/MIN/1.73 M^2

## 2019-09-18 PROCEDURE — 36415 COLL VENOUS BLD VENIPUNCTURE: CPT

## 2019-09-18 PROCEDURE — 82565 ASSAY OF CREATININE: CPT

## 2019-09-19 ENCOUNTER — HOSPITAL ENCOUNTER (OUTPATIENT)
Dept: RADIOLOGY | Facility: HOSPITAL | Age: 64
Discharge: HOME | End: 2019-09-19
Attending: PHYSICIAN ASSISTANT
Payer: COMMERCIAL

## 2019-09-19 ENCOUNTER — TELEPHONE (OUTPATIENT)
Dept: ORTHOPEDICS | Facility: CLINIC | Age: 64
End: 2019-09-19

## 2019-09-19 DIAGNOSIS — C44.622: ICD-10-CM

## 2019-09-19 PROCEDURE — 71260 CT THORAX DX C+: CPT | Mod: 26,,, | Performed by: RADIOLOGY

## 2019-09-19 PROCEDURE — 25500020 PHARM REV CODE 255: Performed by: PHYSICIAN ASSISTANT

## 2019-09-19 PROCEDURE — 71260 CT THORAX DX C+: CPT | Mod: TC

## 2019-09-19 PROCEDURE — 71260 CT CHEST WITH CONTRAST: ICD-10-PCS | Mod: 26,,, | Performed by: RADIOLOGY

## 2019-09-19 RX ADMIN — IOHEXOL 75 ML: 350 INJECTION, SOLUTION INTRAVENOUS at 02:09

## 2019-09-19 NOTE — TELEPHONE ENCOUNTER
Called to inform the patient that I do not have any surgery orders from Dr. Tirado, and cannot book surgery until she has confirmed that she is doing so. Informed patient that she will not be in office until next Tuesday.     Unable to make contact; left a voicemail.

## 2019-09-19 NOTE — TELEPHONE ENCOUNTER
----- Message from Maria De Jesus Ramos sent at 9/19/2019 12:04 PM CDT -----  Contact: pt  Name of Who is Calling:TOM RED [[id number]]    What is the request in detail: Patient would like a call back regarding scheduling surgery for Monday September 23, 2019 Please contact to further discuss and advise    Can the clinic reply by [facility]: No    [facility]: [phone]

## 2019-09-20 ENCOUNTER — ANESTHESIA EVENT (OUTPATIENT)
Dept: SURGERY | Facility: OTHER | Age: 64
End: 2019-09-20
Payer: COMMERCIAL

## 2019-09-20 ENCOUNTER — TELEPHONE (OUTPATIENT)
Dept: ORTHOPEDICS | Facility: CLINIC | Age: 64
End: 2019-09-20

## 2019-09-20 DIAGNOSIS — C44.622: Primary | ICD-10-CM

## 2019-09-20 RX ORDER — OXYCODONE AND ACETAMINOPHEN 5; 325 MG/1; MG/1
1 TABLET ORAL
Qty: 28 TABLET | Refills: 0 | Status: SHIPPED | OUTPATIENT
Start: 2019-09-20 | End: 2020-01-08 | Stop reason: ALTCHOICE

## 2019-09-20 NOTE — PRE ADMISSION SCREENING
Pre admit phone call completed.    Instructions given to patient about NPO status as follows:     The evening before surgery do not eat anything after 9 p.m. ( this includes hard candy, chewing gum and mints).  You may only have GATORADE, POWERADE AND WATER from 9 p.m. until you leave your home. DO NOT  DRINK ANY LIQUIDS ON THE WAY TO THE HOSPITAL.      Patient was also instructed on the below information:    Park in the Parking lot behind the hospital or in the [employer] [facility] across the street from the parking lot.  Parking is complimentary.  If you will be discharged the same day as your procedure, please arrange for a responsible adult to drive you home or  to accompany you if traveling by taxi.  YOU WILL NOT BE PERMITTED TO DRIVE OR TO LEAVE THE HOSPITAL ALONE AFTER SURGERY.  It is strongly recommended that you arrange for someone to remain with you for the first 24 hrs following your surgery.    Patient verbalized understanding of above instructions.

## 2019-09-22 NOTE — H&P
H&P  Orthopaedics    SUBJECTIVE:     History of Present Illness:  Patient is a 64 y.o. 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  Underwent excisional biopsy with evidence of residual tumor in the margins.  He would like to proceed with amputation    Scheduled Meds:  Continuous Infusions:  PRN Meds:    Review of patient's allergies indicates:  No Known Allergies    Past Medical History:   Diagnosis Date    Prostate cancer 2013     Past Surgical History:   Procedure Laterality Date    ANTERIOR CRUCIATE LIGAMENT REPAIR      APPENDECTOMY      EXCISION, MASS Right 8/19/2019    Performed by [facility]ado [facility] at [facility] OR    EXCISION, MASS, FINGER small finger resection Right 9/9/2019    [facility]formed by [facility] at [facility] OR    PROSTATECTOMY      radical    REMOVAL, NAIL, DIGIT right small finger Right 8/19/2019    Performed by [facility] at [facility] OR     Family History   Problem Relation Age of Onset    No Known Problems Mother     Prostate cancer Father     Prostate cancer Maternal Uncle     Melanoma Neg Hx      Social History     Tobacco Use    Smoking status: Never Smoker    Smokeless tobacco: Never Used   Substance Use Topics    Alcohol use: Yes     Alcohol/week: 3.0 standard drinks     Types: 3 Glasses of wine per week     Comment: social    Drug use: No        Review of Systems:  Patient denies constitutional symptoms, cardiac symptoms, respiratory symptoms, GI symptoms.  The remainder of the musculoskeletal ROS is included in the HPI.      OBJECTIVE:     Vital Signs (Most Recent)       Physical Exam:  Gen:  No acute distress  CV:  Peripherally well-perfused.  Pulses 2+ bilaterally.  Lungs:  Normal respiratory effort.  Abdomen:  Soft, non-tender, non-distended  Head/Neck:  Normocephalic.  Atraumatic. No TTP, AROM and PROM intact without pain  Neuro:  CN intact without deficit, SILT throughout B/L Upper & Lower Extremities      MSK:  RUE:  Small  finger with excision of nail bed; NVI    Laboratory:  No results found for this or any previous visit (from the past 72 hour(s)).    Diagnostic Results:      ASSESSMENT/PLAN:     A/P: Juan Luis Milligan Alcides is a 64 y.o. with squamous cell carcinoma to right small finger          Plan:  - to OR for amputation of right small finger

## 2019-09-23 ENCOUNTER — ANESTHESIA (OUTPATIENT)
Dept: SURGERY | Facility: OTHER | Age: 64
End: 2019-09-23
Payer: COMMERCIAL

## 2019-09-23 ENCOUNTER — HOSPITAL ENCOUNTER (OUTPATIENT)
Facility: OTHER | Age: 64
Discharge: HOME | End: 2019-09-23
Attending: ORTHOPAEDIC SURGERY | Admitting: ORTHOPAEDIC SURGERY
Payer: COMMERCIAL

## 2019-09-23 ENCOUNTER — TELEPHONE (OUTPATIENT)
Dept: ORTHOPEDICS | Facility: CLINIC | Age: 64
End: 2019-09-23

## 2019-09-23 VITALS
HEART RATE: 72 BPM | SYSTOLIC BLOOD PRESSURE: 120 MMHG | WEIGHT: 200 LBS | OXYGEN SATURATION: 98 % | RESPIRATION RATE: 16 BRPM | BODY MASS INDEX: 28.63 KG/M2 | TEMPERATURE: 98 F | HEIGHT: 70 IN | DIASTOLIC BLOOD PRESSURE: 70 MMHG

## 2019-09-23 PROCEDURE — 25000003 PHARM REV CODE 250: Performed by: ORTHOPAEDIC SURGERY

## 2019-09-23 PROCEDURE — 36000706: Performed by: ORTHOPAEDIC SURGERY

## 2019-09-23 PROCEDURE — 37000009 HC ANESTHESIA EA ADD 15 MINS: Performed by: ORTHOPAEDIC SURGERY

## 2019-09-23 PROCEDURE — 88311 DECALCIFY TISSUE: CPT | Mod: 26,,, | Performed by: PATHOLOGY

## 2019-09-23 PROCEDURE — 88311 TISSUE SPECIMEN TO PATHOLOGY - SURGERY: ICD-10-PCS | Mod: 26,,, | Performed by: PATHOLOGY

## 2019-09-23 PROCEDURE — 26951 PR AMPUTATION FINGER/THUMB: ICD-10-PCS | Mod: 58,F9,, | Performed by: ORTHOPAEDIC SURGERY

## 2019-09-23 PROCEDURE — 63600175 PHARM REV CODE 636 W HCPCS: Performed by: STUDENT IN AN ORGANIZED HEALTH CARE EDUCATION/TRAINING PROGRAM

## 2019-09-23 PROCEDURE — 88302 TISSUE SPECIMEN TO PATHOLOGY - SURGERY: ICD-10-PCS | Mod: 26,,, | Performed by: PATHOLOGY

## 2019-09-23 PROCEDURE — 25000003 PHARM REV CODE 250: Performed by: NURSE ANESTHETIST, CERTIFIED REGISTERED

## 2019-09-23 PROCEDURE — 88341 IMHCHEM/IMCYTCHM EA ADD ANTB: CPT | Mod: 26,,, | Performed by: PATHOLOGY

## 2019-09-23 PROCEDURE — 63600175 PHARM REV CODE 636 W HCPCS: Performed by: ANESTHESIOLOGY

## 2019-09-23 PROCEDURE — 71000016 HC POSTOP RECOV ADDL HR: Performed by: ORTHOPAEDIC SURGERY

## 2019-09-23 PROCEDURE — 88342 IMHCHEM/IMCYTCHM 1ST ANTB: CPT | Mod: 26,,, | Performed by: PATHOLOGY

## 2019-09-23 PROCEDURE — 25000003 PHARM REV CODE 250: Performed by: ANESTHESIOLOGY

## 2019-09-23 PROCEDURE — 25000003 PHARM REV CODE 250: Performed by: STUDENT IN AN ORGANIZED HEALTH CARE EDUCATION/TRAINING PROGRAM

## 2019-09-23 PROCEDURE — 88342 TISSUE SPECIMEN TO PATHOLOGY - SURGERY: ICD-10-PCS | Mod: 26,,, | Performed by: PATHOLOGY

## 2019-09-23 PROCEDURE — 88331 PATH CONSLTJ SURG 1 BLK 1SPC: CPT | Performed by: PATHOLOGY

## 2019-09-23 PROCEDURE — 88342 IMHCHEM/IMCYTCHM 1ST ANTB: CPT | Performed by: PATHOLOGY

## 2019-09-23 PROCEDURE — 36000707: Performed by: ORTHOPAEDIC SURGERY

## 2019-09-23 PROCEDURE — 71000015 HC POSTOP RECOV 1ST HR: Performed by: ORTHOPAEDIC SURGERY

## 2019-09-23 PROCEDURE — 88331 PATH CONSLTJ SURG 1 BLK 1SPC: CPT | Mod: 26,,, | Performed by: PATHOLOGY

## 2019-09-23 PROCEDURE — 63600175 PHARM REV CODE 636 W HCPCS: Performed by: NURSE ANESTHETIST, CERTIFIED REGISTERED

## 2019-09-23 PROCEDURE — 37000008 HC ANESTHESIA 1ST 15 MINUTES: Performed by: ORTHOPAEDIC SURGERY

## 2019-09-23 PROCEDURE — 88331 TISSUE SPECIMEN TO PATHOLOGY - SURGERY: ICD-10-PCS | Mod: 26,,, | Performed by: PATHOLOGY

## 2019-09-23 PROCEDURE — 26951 AMPUTATION OF FINGER/THUMB: CPT | Mod: 58,F9,, | Performed by: ORTHOPAEDIC SURGERY

## 2019-09-23 PROCEDURE — 88302 TISSUE EXAM BY PATHOLOGIST: CPT | Mod: 26,,, | Performed by: PATHOLOGY

## 2019-09-23 PROCEDURE — 88341 PR IHC OR ICC EACH ADD'L SINGLE ANTIBODY  STAINPR: ICD-10-PCS | Mod: 26,,, | Performed by: PATHOLOGY

## 2019-09-23 RX ORDER — OXYCODONE HYDROCHLORIDE 5 MG/1
10 TABLET ORAL EVERY 4 HOURS PRN
Status: DISCONTINUED | OUTPATIENT
Start: 2019-09-23 | End: 2019-09-23 | Stop reason: HOSPADM

## 2019-09-23 RX ORDER — ACETAMINOPHEN 325 MG/1
650 TABLET ORAL EVERY 4 HOURS PRN
Status: DISCONTINUED | OUTPATIENT
Start: 2019-09-23 | End: 2019-09-23 | Stop reason: HOSPADM

## 2019-09-23 RX ORDER — METOCLOPRAMIDE HYDROCHLORIDE 5 MG/ML
5 INJECTION INTRAMUSCULAR; INTRAVENOUS EVERY 6 HOURS PRN
Status: DISCONTINUED | OUTPATIENT
Start: 2019-09-23 | End: 2019-09-23 | Stop reason: HOSPADM

## 2019-09-23 RX ORDER — ONDANSETRON 2 MG/ML
INJECTION INTRAMUSCULAR; INTRAVENOUS
Status: DISCONTINUED | OUTPATIENT
Start: 2019-09-23 | End: 2019-09-23

## 2019-09-23 RX ORDER — PROPOFOL 10 MG/ML
VIAL (ML) INTRAVENOUS CONTINUOUS PRN
Status: DISCONTINUED | OUTPATIENT
Start: 2019-09-23 | End: 2019-09-23

## 2019-09-23 RX ORDER — SODIUM CHLORIDE, SODIUM LACTATE, POTASSIUM CHLORIDE, CALCIUM CHLORIDE 600; 310; 30; 20 MG/100ML; MG/100ML; MG/100ML; MG/100ML
INJECTION, SOLUTION INTRAVENOUS CONTINUOUS PRN
Status: DISCONTINUED | OUTPATIENT
Start: 2019-09-23 | End: 2019-09-23

## 2019-09-23 RX ORDER — SODIUM CHLORIDE 9 MG/ML
INJECTION, SOLUTION INTRAVENOUS CONTINUOUS
Status: DISCONTINUED | OUTPATIENT
Start: 2019-09-23 | End: 2019-09-23 | Stop reason: HOSPADM

## 2019-09-23 RX ORDER — PROPOFOL 10 MG/ML
VIAL (ML) INTRAVENOUS
Status: DISCONTINUED | OUTPATIENT
Start: 2019-09-23 | End: 2019-09-23

## 2019-09-23 RX ORDER — MUPIROCIN 20 MG/G
OINTMENT TOPICAL
Status: DISCONTINUED | OUTPATIENT
Start: 2019-09-23 | End: 2019-09-23 | Stop reason: HOSPADM

## 2019-09-23 RX ORDER — ROPIVACAINE HYDROCHLORIDE 5 MG/ML
INJECTION, SOLUTION EPIDURAL; INFILTRATION; PERINEURAL
Status: COMPLETED | OUTPATIENT
Start: 2019-09-23 | End: 2019-09-23

## 2019-09-23 RX ORDER — HYDROMORPHONE HYDROCHLORIDE 2 MG/ML
0.2 INJECTION, SOLUTION INTRAMUSCULAR; INTRAVENOUS; SUBCUTANEOUS EVERY 5 MIN PRN
Status: DISCONTINUED | OUTPATIENT
Start: 2019-09-23 | End: 2019-09-23 | Stop reason: HOSPADM

## 2019-09-23 RX ORDER — BACITRACIN ZINC 500 UNIT/G
OINTMENT (GRAM) TOPICAL
Status: DISCONTINUED | OUTPATIENT
Start: 2019-09-23 | End: 2019-09-23 | Stop reason: HOSPADM

## 2019-09-23 RX ORDER — CEFAZOLIN SODIUM 1 G/3ML
2 INJECTION, POWDER, FOR SOLUTION INTRAMUSCULAR; INTRAVENOUS
Status: COMPLETED | OUTPATIENT
Start: 2019-09-23 | End: 2019-09-23

## 2019-09-23 RX ORDER — LIDOCAINE HCL/EPINEPHRINE/PF 2%-1:200K
VIAL (ML) INJECTION
Status: COMPLETED | OUTPATIENT
Start: 2019-09-23 | End: 2019-09-23

## 2019-09-23 RX ORDER — OXYCODONE HYDROCHLORIDE 5 MG/1
5 TABLET ORAL
Status: DISCONTINUED | OUTPATIENT
Start: 2019-09-23 | End: 2019-09-23 | Stop reason: HOSPADM

## 2019-09-23 RX ORDER — SODIUM CHLORIDE 0.9 % (FLUSH) 0.9 %
3 SYRINGE (ML) INJECTION
Status: DISCONTINUED | OUTPATIENT
Start: 2019-09-23 | End: 2019-09-23 | Stop reason: HOSPADM

## 2019-09-23 RX ORDER — ONDANSETRON 2 MG/ML
4 INJECTION INTRAMUSCULAR; INTRAVENOUS EVERY 12 HOURS PRN
Status: DISCONTINUED | OUTPATIENT
Start: 2019-09-23 | End: 2019-09-23 | Stop reason: HOSPADM

## 2019-09-23 RX ORDER — LIDOCAINE HYDROCHLORIDE 10 MG/ML
INJECTION, SOLUTION INTRAVENOUS
Status: DISCONTINUED | OUTPATIENT
Start: 2019-09-23 | End: 2019-09-23

## 2019-09-23 RX ORDER — HYDROCODONE BITARTRATE AND ACETAMINOPHEN 5; 325 MG/1; MG/1
1 TABLET ORAL EVERY 4 HOURS PRN
Status: DISCONTINUED | OUTPATIENT
Start: 2019-09-23 | End: 2019-09-23 | Stop reason: HOSPADM

## 2019-09-23 RX ADMIN — SODIUM CHLORIDE, SODIUM LACTATE, POTASSIUM CHLORIDE, AND CALCIUM CHLORIDE: 600; 310; 30; 20 INJECTION, SOLUTION INTRAVENOUS at 09:09

## 2019-09-23 RX ADMIN — CEFAZOLIN 2 G: 330 INJECTION, POWDER, FOR SOLUTION INTRAMUSCULAR; INTRAVENOUS at 10:09

## 2019-09-23 RX ADMIN — PROPOFOL 40 MG: 10 INJECTION, EMULSION INTRAVENOUS at 11:09

## 2019-09-23 RX ADMIN — PROPOFOL 40 MG: 10 INJECTION, EMULSION INTRAVENOUS at 10:09

## 2019-09-23 RX ADMIN — LIDOCAINE HYDROCHLORIDE,EPINEPHRINE BITARTRATE 20 ML: 20; .005 INJECTION, SOLUTION EPIDURAL; INFILTRATION; INTRACAUDAL; PERINEURAL at 09:09

## 2019-09-23 RX ADMIN — LIDOCAINE HYDROCHLORIDE 40 MG: 10 INJECTION, SOLUTION INTRAVENOUS at 10:09

## 2019-09-23 RX ADMIN — MUPIROCIN: 20 OINTMENT TOPICAL at 06:09

## 2019-09-23 RX ADMIN — ROPIVACAINE HYDROCHLORIDE 20 ML: 5 INJECTION, SOLUTION EPIDURAL; INFILTRATION; PERINEURAL at 09:09

## 2019-09-23 RX ADMIN — PROPOFOL 150 MCG/KG/MIN: 10 INJECTION, EMULSION INTRAVENOUS at 10:09

## 2019-09-23 RX ADMIN — ONDANSETRON 4 MG: 2 INJECTION INTRAMUSCULAR; INTRAVENOUS at 10:09

## 2019-09-23 NOTE — DISCHARGE INSTRUCTIONS
Anesthesia: Monitored Anesthesia Care (MAC)    Anesthesia Safety  · Have an adult family member or friend drive you home after the procedure.  · For the first 24 hours after your surgery:  ¨ Do not drive or use heavy equipment.  ¨ Do not make important decisions or sign documents.  ¨ Avoid alcohol.  ¨ Have someone stay with you, if possible. They can watch for problems and help keep you safe.      Finger Tip Amputation (Open Treatment)  You have cut the tip of your finger partially or completely off. For this type of injury, it is best to allow the wound to heal on its own by growing new skin from the sides. Depending on the size of the wound, it will take from 2-6 weeks for the wound to fill in with new skin. Once healed, you should regain most feeling in the new skin.     Home care  The following guidelines will help you care for your wound at home:  · If a numbing medicine was used on your finger, it will usually wear off in 1-6 hours. Then, take any pain medicine as prescribed. If none was prescribed, you can take an over-the-counter pain reliever.  · Keep the injured hand elevated during the first two days to reduce swelling and pain.  · Keep the bandage clean and dry. If the dressing stays on longer than two days, it will stick to the wound. Unless, you have a doctor appointment in two days, change the bandage as described below.  · If you were given medicines to prevent infection, take them as directed until they are gone or you are told to stop.  · If the dressing sticks to the wound, loosen it by soaking the dressing under warm running water.  · After removing the dressing, clean the wound with soap and water. Then apply an antibiotic ointment.   · Use extra gauze dressing for the first two days to protect the wound from further injury. Cover with a nonstick gauze pad or wrap with bandage gauze. After that, if your wound is small and not too painful, a large stretch bandage is okay to use.  · You may  shower as usual, but keep the dressing dry by using a small plastic bag over the hand, rubber-banded at the wrist. Do not soak your hand in water (no baths or swimming) until your healthcare provider says it's OK.    Follow-up care  Follow up with your healthcare provider as advised.    When to seek medical advice  Call your health care provider right away if any of these occur:  · Bleeding not controlled by direct pressure  · Increasing pain in the wound  · Redness or swelling around the wound  · Pus or fluid coming from the wound or foul odor   · Fever of 100.4ºF (38ºC) or higher, or as directed by your healthcare provider

## 2019-09-23 NOTE — ANESTHESIA PREPROCEDURE EVALUATION
09/23/2019  Juan Luis Mcgrath is a 64 y.o., male.    Anesthesia Evaluation    I have reviewed the Patient Summary Reports.    I have reviewed the Nursing Notes.   I have reviewed the Medications.     Review of Systems  Anesthesia Hx:  No problems with previous Anesthesia  History of prior surgery of interest to airway management or planning: Previous anesthesia: Nerve Block for 9/9/19 excision mass, Ax block.  Denies Family Hx of Anesthesia complications.   Denies Personal Hx of Anesthesia complications.   Social:  Non-Smoker    Hematology/Oncology:  Hematology Normal   Oncology Normal     EENT/Dental:EENT/Dental Normal   Cardiovascular:  Cardiovascular Normal     Pulmonary:  Pulmonary Normal    Renal/:  Renal/ Normal     Hepatic/GI:  Hepatic/GI Normal    Musculoskeletal:  Musculoskeletal Normal    Neurological:  Neurology Normal    Endocrine:  Endocrine Normal    Dermatological:  Skin Normal    Psych:  Psychiatric Normal           Physical Exam  General:  Well nourished    Airway/Jaw/Neck:  Airway Findings: Mouth Opening: Normal Mallampati: II      Dental:  Dental Findings: In tact        Mental Status:  Mental Status Findings:  Cooperative, Alert and Oriented         Anesthesia Plan  Type of Anesthesia, risks & benefits discussed:  Anesthesia Type:  regional  Patient's Preference:   Intra-op Monitoring Plan: standard ASA monitors  Intra-op Monitoring Plan Comments:   Post Op Pain Control Plan: multimodal analgesia  Post Op Pain Control Plan Comments:   Induction:   IV  Beta Blocker:         Informed Consent: Patient understands risks and agrees with Anesthesia plan.  Questions answered. Anesthesia consent signed with patient.  ASA Score: 1     Day of Surgery Review of History & Physical:    H&P update referred to the surgeon.     Anesthesia Plan Notes: Booked as regional, Pt rec'd versed 4mg, fent  2cc, prop qqt, states was too sedated last time    Would like to discuss plan with surgeon        Ready For Surgery From Anesthesia Perspective.

## 2019-09-23 NOTE — BRIEF OP NOTE
[facility]  Brief Operative Note     SUMMARY     Surgery Date: 9/23/2019     Surgeon(s) and Role:     * Luna Tirado MD - Primary    Assisting Surgeon: None    Pre-op Diagnosis:  Squamous cell skin cancer, finger, right [C44.622]    Post-op Diagnosis:  Post-Op Diagnosis Codes:     * Squamous cell skin cancer, finger, right [C44.622]    Procedure(s) (LRB):  AMPUTATION, FINGER (Right)    Anesthesia: Regional    Description of the findings of the procedure: see op note    Findings/Key Components: see op note    Estimated Blood Loss: * No values recorded between 9/23/2019 10:15 AM and 9/23/2019 12:16 PM *         Specimens:   Specimen (12h ago, onward)     Start     Ordered    09/23/19 1116  Specimen to Pathology - Surgery  Once     Comments:  1. RIGHT SMALL FINGER SKIN (FROZEN SECTION)  SINGLE STITCH MARKS: DORSAL DOUBLE STITCH MARKS: RADIAL      09/23/19 1119    09/23/19 0947  Specimen to Pathology - Surgery  Once     Comments:  1) Right Small Fingertip, Stitch marks Distal Radial      09/23/19 1024                Discharge Note    SUMMARY     Admit Date: 9/23/2019    Discharge Date and Time: 09/23/2019     Hospital Course (synopsis of major diagnoses, care, treatment, and services provided during the course of the hospital stay): Hospital Course:  On 9/23/2019, the patient arrived to pre-op area for proper pre-operative management.  Upon completion of pre-operative preparation, the patient was taken back to the operative theatre.  A amputation right small finger at DIP was performed without complication and the patient was transported to the post anesthesia care unit in stable condition. The patient suffered minimal blood loss and electrolyte imbalances during the procedure, which were correct accordingly if neccessary. After appropriate recovery from the anaesthetic agents used during the surgery the patient was discharged home.        Final Diagnosis: Post-Op Diagnosis Codes:     *  Squamous cell skin cancer, finger, right [C44.622]    Disposition: Home or Self Care    Follow Up/Patient Instructions:     Medications:  Reconciled Home Medications:      Medication List      CONTINUE taking these medications    acetaminophen 650 MG Tbsr  Commonly known as:  TYLENOL  Take 650 mg by mouth every 8 (eight) hours.     ketorolac 10 mg tablet  Commonly known as:  TORADOL  Take 1 tablet (10 mg total) by mouth every 8 (eight) hours as needed for Pain.     oxyCODONE-acetaminophen 5-325 mg per tablet  Commonly known as:  PERCOCET  Take 1 tablet by mouth every 4 to 6 hours as needed for Pain (moderate to severe).     sildenafil 50 MG tablet  Commonly known as:  VIAGRA          Discharge Procedure Orders   Diet general     Sponge bath only until clinic visit     Lifting restrictions   Order Comments: No lifting operative extremity     No driving, operating heavy equipment or signing legal documents while taking pain medication     Call MD for:  temperature >100.4     Call MD for:  persistent nausea and vomiting     Call MD for:  severe uncontrolled pain     Call MD for:  difficulty breathing, headache or visual disturbances     Call MD for:  redness, tenderness, or signs of infection (pain, swelling, redness, odor or green/yellow discharge around incision site)     Call MD for:  hives     Call MD for:  persistent dizziness or light-headedness     Call MD for:  extreme fatigue     Leave dressing on - Keep it clean, dry, and intact until clinic visit

## 2019-09-23 NOTE — TRANSFER OF CARE
"Anesthesia Transfer of Care Note    Patient: Juan Luis Mcgrath    Procedure(s) Performed: Procedure(s) (LRB):  AMPUTATION, FINGER (Right)    Patient location: [facility]    Anesthesia Type: general    Transport from OR: Transported from OR on room air with adequate spontaneous ventilation    Post pain: adequate analgesia    Post assessment: no apparent anesthetic complications    Post vital signs: stable    Level of consciousness: awake    Nausea/Vomiting: no nausea/vomiting    Complications: none    Transfer of care protocol was followed      Last vitals:   Visit Vitals  /77 (BP Location: Left arm, Patient Position: Lying)   Pulse (!) 58   Temp 36.3 °C (97.3 °F) (Oral)   Resp 16   Ht 5' 10" (1.778 m)   Wt 90.7 kg (200 lb)   SpO2 96%   BMI 28.70 kg/m²     "

## 2019-09-23 NOTE — ANESTHESIA POSTPROCEDURE EVALUATION
Anesthesia Post Evaluation    Patient: Juan Luis Mcgrath    Procedure(s) Performed: Procedure(s) (LRB):  AMPUTATION, FINGER (Right)    Final Anesthesia Type: regional  Patient location during evaluation: [facility]  Patient participation: Yes- Able to Participate  Level of consciousness: awake and alert  Post-procedure vital signs: reviewed and stable  Pain management: adequate  Airway patency: patent  PONV status at discharge: No PONV  Anesthetic complications: no      Cardiovascular status: blood pressure returned to baseline  Respiratory status: unassisted  Hydration status: euvolemic  Follow-up not needed.          Vitals Value Taken Time   /77 9/23/2019  6:52 AM   Temp 36.3 °C (97.3 °F) 9/23/2019  6:52 AM   Pulse 58 9/23/2019  6:52 AM   Resp 16 9/23/2019  6:52 AM   SpO2 96 % 9/23/2019  6:52 AM         No case tracking events are documented in the log.      Pain/Ana Score: No data recorded

## 2019-09-23 NOTE — ANESTHESIA PROCEDURE NOTES
Ax block    Patient location during procedure: holding area    Reason for block: primary anesthetic   Diagnosis: Squamous cell CA R small finger   Start time: 9/23/2019 9:47 AM  Timeout: 9/23/2019 9:45 AM   End time: 9/23/2019 9:57 AM    Staffing  Authorizing Provider: Arlene Peoples MD  Performing Provider: Arlene Peoples MD    Preanesthetic Checklist  Completed: patient identified, site marked, surgical consent, pre-op evaluation, timeout performed, IV checked, risks and benefits discussed and monitors and equipment checked  Peripheral Block  Patient position: supine  Prep: ChloraPrep  Patient monitoring: heart rate, cardiac monitor, continuous pulse ox and frequent blood pressure checks  Block type: axillary  Laterality: right  Injection technique: single shot  Needle  Needle type: short-bevel   Needle gauge: 22 G  Needle length: 3.5 in  Needle localization: nerve stimulator and ultrasound guidance   -ultrasound image captured on disc.  Assessment  Injection assessment: negative aspiration, negative parasthesia and local visualized surrounding nerve  Paresthesia pain: none  Heart rate change: no  Slow fractionated injection: yes  Additional Notes  Ropivicaine 0.5% 20cc + Lidocaine 2% with epi 20cc  25cc in sheath  5cc on musculocutaneous n  10cc skin wheal

## 2019-09-25 NOTE — OP NOTE
DATE OF PROCEDURE:  09/23/2019    SERVICE:  Orthopedics.    ATTENDING SURGEON:  Luna Tirado M.D.    RESIDENT SURGEON:  Don Olivares M.D. (RES)    PREOPERATIVE DIAGNOSIS:  Right small finger squamous cell carcinoma.    POSTOPERATIVE DIAGNOSIS:  Right small finger squamous cell carcinoma.    PROCEDURES PERFORMED:  1.  Right small finger fingertip amputation.  2.  Splint application, right upper extremity.    ANESTHESIA:  Regional MAC.    FLUIDS:  Lactated Ringer's.    BLOOD LOSS:  None.  No blood was given.    TOURNIQUET TIME:  2 hours.    PACKS AND DRAINS:  None.    IMPLANTS:  None.    SPECIMENS:  1.  Right small fingertip.  2.  Right small finger distal skin.    COMPLICATIONS:  None.    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  He had been   diagnosed with squamous cell carcinoma subungual.  He continued to have   dysplasia per pathologic report and therefore, the decision was made for a   fingertip amputation.  Risks and benefits were explained to the patient in   clinic.  Consents were signed preoperatively.    PROCEDURE IN DETAIL:  After the correct site was marked with the patient's   participation in the holding area, the patient underwent regional anesthesia,   was brought to the Operating Room, placed in supine position, underwent MAC   anesthesia.  A well-padded nonsterile tourniquet was placed on the right upper   extremity.  The right upper extremity was prepped and draped in normal sterile   fashion.  A timeout was conducted for the correct site and procedure to be   indicated.  IV antibiotics were given to the patient preoperatively.  The arm   was elevated, it was not exsanguinated.  Tourniquet was insufflated to 250 mmHg.    A fishmouth type shape incision was marked out on the fingertip.  The incision   was made.  Careful dissection down to the extensor and flexor tendons.  The FDP   tendon was pulled, retracted and released as well as the extensor tendon.     Neurectomies were performed, the radial and ulnar side of the small finger.  The   amputation did complete through the DIP joint.  This was sent for fresh frozen.    It was difficult to get the diagnosis other than dysplasia that was still   presented after conversing with the pathologist on the phone.  It was discussed   that soft tissue, specifically skin would be taken another millimeter.  This was   taken per the pathologist who states that there was still low-grade dysplasia,   but she states this is most likely throughout the skin and this is not cancer at   this point.  The area was then irrigated with copious amounts of normal saline.    Nylon closed the skin.  Sterile dressing was applied.  The patient was placed   in a well-padded splint, tolerated the procedure well and was brought to the   recovery area in stable condition.    POSTOPERATIVE PLAN FOR THIS PATIENT:  Keep the dressing clean, dry and intact.    We will see the patient back at two weeks.  Sutures are to be removed.  Therapy   will be needed to be initiated at this time.  Pathology report to be given to   the patient.      LES/HN  dd: 09/25/2019 08:45:44 (CDT)  td: 09/25/2019 13:37:17 (CDT)  Doc ID   #[id number]  Job ID #[id number]    CC:

## 2019-09-27 ENCOUNTER — PATIENT MESSAGE (OUTPATIENT)
Dept: OPTOMETRY | Facility: CLINIC | Age: 64
End: 2019-09-27

## 2019-10-02 ENCOUNTER — DOCUMENTATION ONLY (OUTPATIENT)
Dept: ORTHOPEDICS | Facility: CLINIC | Age: 64
End: 2019-10-02

## 2019-10-02 ENCOUNTER — OFFICE VISIT (OUTPATIENT)
Dept: ORTHOPEDICS | Facility: CLINIC | Age: 64
End: 2019-10-02
Payer: COMMERCIAL

## 2019-10-02 VITALS — BODY MASS INDEX: 28.63 KG/M2 | WEIGHT: 200 LBS | HEIGHT: 70 IN

## 2019-10-02 DIAGNOSIS — Z98.890 POST-OPERATIVE STATE: Primary | ICD-10-CM

## 2019-10-02 PROCEDURE — 99024 PR POST-OP FOLLOW-UP VISIT: ICD-10-PCS | Mod: S$GLB,,, | Performed by: PHYSICIAN ASSISTANT

## 2019-10-02 PROCEDURE — 99999 PR PBB SHADOW E&M-EST. PATIENT-LVL II: ICD-10-PCS | Mod: PBBFAC,,, | Performed by: PHYSICIAN ASSISTANT

## 2019-10-02 PROCEDURE — 29125 APPL SHORT ARM SPLINT STATIC: CPT | Mod: 58,RT,S$GLB, | Performed by: PHYSICIAN ASSISTANT

## 2019-10-02 PROCEDURE — 29125 PR APPLY FOREARM SPLINT,STATIC: ICD-10-PCS | Mod: 58,RT,S$GLB, | Performed by: PHYSICIAN ASSISTANT

## 2019-10-02 PROCEDURE — 99024 POSTOP FOLLOW-UP VISIT: CPT | Mod: S$GLB,,, | Performed by: PHYSICIAN ASSISTANT

## 2019-10-02 PROCEDURE — 99999 PR PBB SHADOW E&M-EST. PATIENT-LVL II: CPT | Mod: PBBFAC,,, | Performed by: PHYSICIAN ASSISTANT

## 2019-10-02 NOTE — PROGRESS NOTES
Juan Luis Mcgrath presents today for splint complaint. He is s/p right small finger amputation by Dr. Tirado on 9/23/19. He reports the splint is tight around his fingers. He also states he would like a smaller splint that does not include the wrist or the ring finger. 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     Splint removed. Incision with sutures in place. There is no sign of infection. Minimal edema and erythema.     Plan   -Discussed with pt need to be placed back into a right ulnar gutter splint with the small and ring finger. He stated he will take this off. Informed him of risks of doing so. He did speak with Dr. Tirado recently who also informed him he must remain in a splint.  -RTC at regularly scheduled post op       Jazmin Hammond PA-C  [facility]   [facility]  [place], [place]

## 2019-10-08 ENCOUNTER — OFFICE VISIT (OUTPATIENT)
Dept: ORTHOPEDICS | Facility: CLINIC | Age: 64
End: 2019-10-08
Payer: COMMERCIAL

## 2019-10-08 VITALS
SYSTOLIC BLOOD PRESSURE: 126 MMHG | WEIGHT: 199.94 LBS | HEIGHT: 70 IN | BODY MASS INDEX: 28.62 KG/M2 | DIASTOLIC BLOOD PRESSURE: 85 MMHG | HEART RATE: 64 BPM

## 2019-10-08 DIAGNOSIS — Z47.89 ORTHOPEDIC AFTERCARE: ICD-10-CM

## 2019-10-08 DIAGNOSIS — C44.622: Primary | ICD-10-CM

## 2019-10-08 PROCEDURE — 99999 PR PBB SHADOW E&M-EST. PATIENT-LVL III: CPT | Mod: PBBFAC,,, | Performed by: PHYSICIAN ASSISTANT

## 2019-10-08 PROCEDURE — 99024 PR POST-OP FOLLOW-UP VISIT: ICD-10-PCS | Mod: S$GLB,,, | Performed by: PHYSICIAN ASSISTANT

## 2019-10-08 PROCEDURE — 99024 POSTOP FOLLOW-UP VISIT: CPT | Mod: S$GLB,,, | Performed by: PHYSICIAN ASSISTANT

## 2019-10-08 PROCEDURE — 99999 PR PBB SHADOW E&M-EST. PATIENT-LVL III: ICD-10-PCS | Mod: PBBFAC,,, | Performed by: PHYSICIAN ASSISTANT

## 2019-10-08 NOTE — PROGRESS NOTES
Mr. Mcgrath is here today for a post-operative visit.  He is 15 days status post right small finger amputation by Dr. Tirado on 9/23/19.  He has previously undergone right small finger mass excision 9/9/19 and right small finger removal of nail with mass excision for diagnosis 8/19/19.  He reports that he is doing well.  Pain is mild.  He denies fever, chills, and sweats since the time of the surgery.     PATHOLOGY:  FINAL PATHOLOGIC DIAGNOSIS  1. Skin and soft tissue, right small finger tip, excision:  - FOCAL DERMAL FIBROSIS AND REACTIVE CHRONIC INFLAMMATION.  - NEGATIVE FOR DYSPLASIA. NEGATIVE FOR IN SITU OR INVASIVE CARCINOMA.  MICROSCOPIC DESCRIPTION: Sections show an excision of skin, soft tissue, nail apparatus, and bone. There is  focal fibrosis and reactive chronic inflammation. There is no evidence of dysplasia. There is no evidence of in situ  or invasive carcinoma. Pancytokeratin immunohistochemical stain (AE1/AE3) and Ki 67 immunohistochemical  stain were performed on all 3 blocks: Pancytokeratin immunohistochemical stain fails to reveal invasive carcinoma.  Ki 67 immunohistochemical stain shows an elevated proliferative index limited to the lower levels of the involved  epithelium. Appropriately reactive controls were reviewed.  2. Skin, right small finger, excision:  - SKIN WITH NO SIGNIFICANT HISTOPATHOLOGIC ALTERATION.  - NEGATIVE FOR DYSPLASIA. NEGATIVE FOR IN SITU OR INVASIVE CARCINOMA.  MICROSCOPIC DESCRIPTION: Sections show acral skin with minimal chronic inflammation and no significant  histopathologic alteration. Pancytokeratin immunohistochemical stain (AE1/AE3) and Ki 67 immunohistochemical  stain were performed on all 4 blocks: Pancytokeratin immunohistochemical stain fails to reveal invasive carcinoma.  Ki 67 immunohistochemical stain shows an elevated proliferative index limited to the lower levels of the involved  epithelium. Appropriately reactive controls were reviewed.    Physical  "exam:    Vitals:    10/08/19 1109   BP: 126/85   Pulse: 64   Weight: 90.7 kg (199 lb 15.3 oz)   Height: 5' 10" (1.778 m)   PainSc:   3     Vital signs are stable, patient is afebrile.  Patient is well dressed and well groomed, no acute distress.  Alert and oriented to person, place, and time.  Post op dressing taken down.  Incision is clean, dry, and intact; small scabs in place as well.  There is no erythema or exudate.  There is no sign of any infection. He is NVI. Sutures removed without difficulty.  Fair small finger ROM.    Assessment: 15 days status post right small finger amputation    Plan:  Juan Luis was seen today for post-op evaluation and post-op evaluation.    Diagnoses and all orders for this visit:    Squamous cell skin cancer, finger, right  -     Ambulatory Referral to Physical/Occupational Therapy        - PO instruction reviewed and provided to patient  - Pathology report was reviewed with the patient and the patient received a copy of the report.  - To Medical Oncology for consult  - Orders for OT  - Discussed scar massage  - Follow up in 4 weeks   - Call with questions or concerns      "

## 2019-10-09 ENCOUNTER — TELEPHONE (OUTPATIENT)
Dept: HEMATOLOGY/ONCOLOGY | Facility: CLINIC | Age: 64
End: 2019-10-09

## 2019-10-09 LAB — FUNGUS SPEC CULT: NORMAL

## 2019-10-09 NOTE — TELEPHONE ENCOUNTER
----- Message from CAMELIA Marx sent at 10/8/2019  1:45 PM CDT -----  Regarding: New Medical Oncology referral  Hi,   This patient had a subungual invasive SCCA, removed by Dr. Tirado.  Can y'all help with scheduling a new medical oncology eval/consult with Dr. Young?  Referral placed 9/2019.  Thank you,  Jany Mccain PA-C

## 2019-10-09 NOTE — TELEPHONE ENCOUNTER
----- Message from Alyse Lopez sent at 10/9/2019  9:11 AM CDT -----  Contact: Katie crump/Hand clinic [id number]  Calling to sched tiffanie for subungual invasive SCCA removed from hand on 9/23/19    pls contact pt @ [phone] or Katie MATUTE

## 2019-10-15 ENCOUNTER — CLINICAL SUPPORT (OUTPATIENT)
Dept: REHABILITATION | Facility: HOSPITAL | Age: 64
End: 2019-10-15
Payer: COMMERCIAL

## 2019-10-15 DIAGNOSIS — Z74.09 STIFFNESS DUE TO IMMOBILITY: ICD-10-CM

## 2019-10-15 DIAGNOSIS — R52 PAIN: ICD-10-CM

## 2019-10-15 DIAGNOSIS — M25.60 STIFFNESS DUE TO IMMOBILITY: ICD-10-CM

## 2019-10-15 PROCEDURE — 97165 OT EVAL LOW COMPLEX 30 MIN: CPT | Mod: PO

## 2019-10-15 PROCEDURE — 97110 THERAPEUTIC EXERCISES: CPT | Mod: PO

## 2019-10-15 NOTE — PLAN OF CARE
[facility] Occupational Therapy  Hand and Wrist  Evaluation     Date: 10/15/2019  Name: Juan Luis Mcgrath  Clinic Number: [id number]    Therapy Diagnosis:   Encounter Diagnoses   Name Primary?    Stiffness due to immobility     Pain      Physician: Miladis Mccain PA    Physician Orders: Evaluate and treat ; 2x/wk x 6 wks , Modalities prn  Medical Diagnosis: C44.622 (ICD-10-CM) - Squamous cell skin cancer, finger, right  Evaluation Date: 10/15/2019  Insurance Authorization Expiration date : 12-31-19  Plan of Care Certification Period: 10-15-19 to 11-15-19  Date of Return to MD: 11-5-19    Visit # / Visits authorized: 1 / 20  Time In:10 am   Time Out: 11 am   Total Billable Time: 60 minutes    Precautions:  Standard and cancer    Subjective       Involved Side: right small finger   Dominant Side: Right  History of Current Condition/Mechanism of Injury:   He has previously undergone right small finger mass excision 9/9/19 and right small finger removal of nail with mass excision for diagnosis 8/19/19  Imaging: Please see image report   Surgical Procedure and Date: 8/19/19, 9-9-19 and  9-23-19  exicision mass of finger due to carcinoma    Past Medical History/Physical Systems Review:   Juan Luis Mcgrath  has a past medical history of Prostate cancer and Squamous cell carcinoma.    Juan Luis Mcgrath  has a past surgical history that includes Anterior cruciate ligament repair; Prostatectomy; Appendectomy; Removal of nail of digit (Right, 8/19/2019); Finger mass excision (Right, 9/9/2019); and Finger amputation (Right, 9/23/2019).    Juan Luis has a current medication list which includes the following prescription(s): acetaminophen, ketorolac, oxycodone-acetaminophen, and sildenafil.    Review of patient's allergies indicates:  No Known Allergies         Pain:  Functional Pain Scale Rating 0-10:   3/10 on current  2/10 at best with medicine   3/10 at worst  Location: on ulnar and radial      Patient's Goals for Therapy:  to increase motion, reduce pain,     Occupation:  Ans  ,  At main campus    Working presently: employed  Duties:     Functional Limitations/Social History:    Previous functional status includes: Independent with all ADLs.     Current FunctionalStatus   Home/Living environment : lives with their family      Limitation of Functional Status as follows:   ADLs/IADLs:  Less  strength and inability to  with right hand        Leisure: weekend  Objective       Observation:   Skin intact, Skin dry and Joint stiffness    Sensation: Ulnar  Nerve Distribution   10/15/2019 10/15/2019    Left Right   Monofilament Testing     Normal 1.65-2.83 Present Present   Diminished Light Touch 3.22-3.61 Present Present   Diminished Protective 3.84-4.31 Present Present   Loss of Protective 4.56-6.65 Present Present   Untestable >6.65 Present Present       Wound Assessment:   Closed  Size: across incision        Range of Motion:   Right Active   10/15/2019   Ring finger                          MP Ext/Flex /85                         PIP Ext/Flex full/100                         DIP Ext/Flex full/60   Small finger                          MP Ext/Flex full/78                         IP Ext/Flex full/30                 Comments:         Strength: (SENDY Dynamometer in lbs.) Average 3 trials, Position II:     10/15/2019 10/15/2019   Rung 2  Right  Left   SENDY # 2 60# 80#           Treatment     Treatment Time In/out  (separate from total time) : 10 minutes at the end of the hour     Home Exercise Program/Education:  Issued HEP (see patient instructions in EMR) and educated on modality use for pain management . Exercises were reviewed and Juan Luis was able to demonstrate them prior to the end of the session.   Pt received a written copy of exercises to perform at home. Juan Luis demonstrated good  understanding of the education provided.  Pt was advised to perform these exercises free of pain, and to stop  performing them if pain occurs.    Patient/Family Education: role of OT, goals for OT, double booking , scheduling/cancellations - pt verbalized understanding. Discussed insurance limitations with patient.    Additional Education provided: role of CHT/OT, goals for CHT/OT, discussed insurance limitation with patient- patient verbalized understanding        patient received retro-grade massage and taught /demonstrated wound care and how to re-wrap coban  Assessment     This 64 y.o. male referred to Outpatient Occupational Therapy with diagnosis of   Encounter Diagnoses   Name Primary?    Stiffness due to immobility     Pain     presents with limitations as described in problem list. Patient can benefit from Occupational Therapy services for Ultrasound, moist heat, PROM, AAROM, AROM, Theraputic exercises, joint mobs and home exercise program provied with written instructions and Orthosis, if deemed necessary . The following goals were discussed with the patient and he is in agreement with them as to be addressed in the treatment plan.     Problem List:   Decreased function of Right UE, Decreased ROM, Increased pain, Decreased strength and Inability to perform leisure activiites      Anticipated barriers to occupational therapy:  Surgical   Pt has no cultural, educational or language barriers to learning provided.        Profile and History Assessment of Occupational Performance Level of Clinical Decision Making Complexity Score   Occupational Profile:   Juan Luis Mcgrath is a 64 y.o. male who lives with their family and is currently employed Juan Luis Mcgrath has difficulty with  ADLs and IADLs as listed previously, which  affecting his/her daily functional abilities.      Comorbidities:    has a past medical history of Prostate cancer and Squamous cell carcinoma.    Medical and Therapy History Review:   Brief               Performance Deficits    Physical:  Joint Mobility  Muscle Power/Strength    Strength    Cognitive:  No Deficits    Psychosocial:    No Deficits     Clinical Decision Making:  low    Assessment Process:  Problem-Focused Assessments    Modification/Need for Assistance:  Minimal-Moderate Modifications/Assistance    Intervention Selection:  Limited Treatment Options       low  Based on PMHX, co morbidities , data from assessments and functional level of assistance required with task and clinical presentation directly impacting function.         The following goals were discussed with the patient and patient is in agreement with them as to be addressed in the treatment plan.     Goals:       Goals to be met in 4  weeks:    1) Patient to be IND with HEP and modalities for pain managment.  2) Patient will  Increase Active Range of motion 15-20 degrees in hand/wrist to increase functional hand use for ADLs/work/leisure activities.  3)Pt will increase  strength 10-20 lbs. to improve functional grasp for ADLs/work/leisure activities.       Plan     Plan    Certification Period/Plan of care expiration: 10/15/2019 to 11/15/2019.    Outpatient Occupational Therapy 1 times weekly for 4 weeks to include the following interventions: Paraffin, Fluidotherapy, Modalities for pain management, US 3 mhz, Therapeutic exercises/activities., Strengthening, Edema Control, Scar Management and Wound Care.      Danielle Ceja, MOT,  OTR/L, CHT  Occupational therapist, Certified Hand Therapist

## 2019-10-24 ENCOUNTER — CLINICAL SUPPORT (OUTPATIENT)
Dept: REHABILITATION | Facility: HOSPITAL | Age: 64
End: 2019-10-24
Payer: COMMERCIAL

## 2019-10-24 DIAGNOSIS — M25.60 STIFFNESS DUE TO IMMOBILITY: ICD-10-CM

## 2019-10-24 DIAGNOSIS — Z74.09 STIFFNESS DUE TO IMMOBILITY: ICD-10-CM

## 2019-10-24 DIAGNOSIS — R52 PAIN: ICD-10-CM

## 2019-10-24 PROCEDURE — 97110 THERAPEUTIC EXERCISES: CPT | Mod: PO

## 2019-10-24 PROCEDURE — 97140 MANUAL THERAPY 1/> REGIONS: CPT | Mod: PO

## 2019-10-24 NOTE — PROGRESS NOTES
"                            Occupational Therapy Daily Treatment Note       Date: 10/24/2019  Name: Juan Luis Mcgrath  Clinic Number: [id number]    Therapy Diagnosis:   Encounter Diagnoses   Name Primary?    Stiffness due to immobility     Pain      Physician:Luna Tirado MD and Miladis Mccain PA     Physician Orders: Evaluate and treat ; 2x/wk x 6 wks , Modalities prn  Medical Diagnosis: C44.622 (ICD-10-CM) - Squamous cell skin cancer, finger, right  Evaluation Date: 10/15/2019  Insurance Authorization Expiration date : 12-31-19  Plan of Care Certification Period: 10-15-19 to 11-15-19  Date of Return to MD: 11-5-19     Visit # / Visits authorized: 1 / 20  Time In:10 am   Time Out: 11 am   Total Billable Time: 60 minutes     Precautions:  Standard and cancer      Subjective     Pt reports: " I have not done a thing because I did not know I could."    he was not compliant with home exercise program given last session.   Response to previous treatment: very stiff since last visit       Pain: 3/10  Location: right  Small finger       Objective    received the following supervised modalities after being cleared for contradictions for 10 minutes:   -  Jaun Luis received moist heat  to right  Hand and coban sf down for fisting for 10 minutes to increase blood flow, circulation, pain management and for tissue elasticity prior to therex.              Juan Luis   received the following manual therapy techniques to increase joint mobilization and soft tissue mobilization for 35 minutes:         -Performed manual therapy techniques to small finger  area including joint mobilizations, grade for 35 minutes to increase joint mobility, ROM and for pain management.       Juan Luis  participated in dynamic functional therapeutic exercises to improve functional performance while increasing strength, endurance, ROM,  and flexibility  for 10  minutes, including:  - gripping  -PIP and MP blocking  - reverse blocking  - tge  - isometric " extension with CHT blocking       Post session:   MP flexion: 87  PIP flexion: 71      Home Exercises and Education Provided     Education provided:  - discussed insurance limitation  - Progress towards goals     Written Home Exercises Provided: Patient instructed to cont prior HEP.  Exercises were reviewed and Juan Luis was able to demonstrate them prior to the end of the session.  Juan Luis demonstrated good  understanding of the HEP provided.   .   See EMR under Patient Instructions for exercises provided prior visit.       Assessment     Pt would continue to benefit from skilled OT. Pt has not been performing HEP as he went to wound care and was not sure hwo much he could do as his finger is healing . He was instructed to perform all exercises while he is in wound care and just not touch the tip of finger,  Patient arrived with stiff small finger and no motion in PIP. Post session was able to make good fist, nit full, but 71 degrees more.    Juan Luis is progressing well towards his goals and there are no updates to goals at this time. Pt prognosis is Good.   The patient demonstrated proper understanding  of each exercise.Pt required verbal and tactile cues for HEP and education with use of finger motion and use.  Pt  will continue to benefit from skilled OT intervention.     Anticipated barriers to occupational therapy: cancer    Pt's spiritual, cultural and educational needs considered and pt agreeable to plan of care and goals.    Goals:  Goals to be met in 4  weeks:     1) Patient to be IND with HEP and modalities for pain managment.  2) Patient will  Increase Active Range of motion 15-20 degrees in hand/wrist to increase functional hand use for ADLs/work/leisure activities.  3)Pt will increase  strength 10-20 lbs. to improve functional grasp for ADLs/work/leisure activities.         Plan      Plan     Certification Period/Plan of care expiration: 10/15/2019 to 11/15/2019.     Outpatient Occupational Therapy 1 times  weekly for 4 weeks to include the following interventions: Paraffin, Fluidotherapy, Modalities for pain management, US 3 mhz, Therapeutic exercises/activities., Strengthening, Edema Control, Scar Management and Wound Care.        SHIELA Dickerson,  OTR/L, CHT  Occupational therapist, Certified Hand Therapist         SHIELA Dickerson, OTR/MAYITO, CHT

## 2019-10-31 ENCOUNTER — CLINICAL SUPPORT (OUTPATIENT)
Dept: REHABILITATION | Facility: HOSPITAL | Age: 64
End: 2019-10-31
Payer: COMMERCIAL

## 2019-10-31 ENCOUNTER — TELEPHONE (OUTPATIENT)
Dept: ORTHOPEDICS | Facility: CLINIC | Age: 64
End: 2019-10-31

## 2019-10-31 DIAGNOSIS — M25.60 STIFFNESS DUE TO IMMOBILITY: ICD-10-CM

## 2019-10-31 DIAGNOSIS — R52 PAIN: ICD-10-CM

## 2019-10-31 DIAGNOSIS — Z74.09 STIFFNESS DUE TO IMMOBILITY: ICD-10-CM

## 2019-10-31 PROCEDURE — 97018 PARAFFIN BATH THERAPY: CPT | Mod: PO

## 2019-10-31 PROCEDURE — 97110 THERAPEUTIC EXERCISES: CPT | Mod: PO

## 2019-10-31 NOTE — PROGRESS NOTES
"                            Occupational Therapy Daily Treatment Note       Date: 10/31/2019  Name: Juan Luis Mcgrath  Clinic Number: [id number]    Therapy Diagnosis:   Encounter Diagnoses   Name Primary?    Stiffness due to immobility     Pain      Physician:Luna Tirado MD and Miladis Mccain PA     Physician Orders: Evaluate and treat ; 2x/wk x 6 wks , Modalities prn  Medical Diagnosis: C44.622 (ICD-10-CM) - Squamous cell skin cancer, finger, right  Evaluation Date: 10/15/2019  Insurance Authorization Expiration date : 12-31-19  Plan of Care Certification Period: 10-15-19 to 11-15-19  Date of Return to MD: 11-5-19     Visit # / Visits authorized: 1 / 20  Time In:1 pm   Time Out: 2 pm   Total Billable Time: 60 minutes     Precautions:  Standard and cancer      Subjective     Pt reports: " I have been working on my motion."    he was not compliant with home exercise program given last session.   Response to previous treatment: very stiff since last visit       Pain: 3/10  Location: right  Small finger       Objective    received the following supervised modalities after being cleared for contradictions for 10 minutes:   -  Juan Luis received moist heat  to right  Hand and coban sf down for fisting for 10 minutes to increase blood flow, circulation, pain management and for tissue elasticity prior to therex.              Juan Luis   received the following manual therapy techniques to increase joint mobilization and soft tissue mobilization for 35 minutes:         -Performed manual therapy techniques to small finger  area including joint mobilizations, grade for 35 minutes to increase joint mobility, ROM and for pain management.       Juan Luis  participated in dynamic functional therapeutic exercises to improve functional performance while increasing strength, endurance, ROM,  and flexibility  for 10  minutes, including:  - gripping  -PIP and MP blocking  - reverse blocking  - tge  - isometric extension with CHT " blocking     Pre session:   MP: 89  PIP: 75      Post session:   MP flexion: 89  PIP flexion: 95      Home Exercises and Education Provided     Education provided:  - discussed insurance limitation  - Progress towards goals     Written Home Exercises Provided: Patient instructed to cont prior HEP.  Exercises were reviewed and Juan Luis was able to demonstrate them prior to the end of the session.  Juan Luis demonstrated good  understanding of the HEP provided.   .   See EMR under Patient Instructions for exercises provided 10/31/19 with red sponge for gripping and red putty for highligther pulls.       Assessment     Pt would continue to benefit from skilled OT. Pt supplied putty for gripping and red sponge. Pt with healing scar . Improved motion noted.  Juan Luis is progressing well towards his goals and there are no updates to goals at this time. Pt prognosis is Good.   The patient demonstrated proper understanding  of each exercise.Pt required verbal and tactile cues for HEP and education with use of finger motion and use.  Pt  will continue to benefit from skilled OT intervention.     Anticipated barriers to occupational therapy: cancer    Pt's spiritual, cultural and educational needs considered and pt agreeable to plan of care and goals.    Goals:  Goals to be met in 4  weeks:     1) Patient to be IND with HEP and modalities for pain managment.  2) Patient will  Increase Active Range of motion 15-20 degrees in hand/wrist to increase functional hand use for ADLs/work/leisure activities.  3)Pt will increase  strength 10-20 lbs. to improve functional grasp for ADLs/work/leisure activities.         Plan      Plan     Certification Period/Plan of care expiration: 10/15/2019 to 11/15/2019.     Outpatient Occupational Therapy 1 times weekly for 4 weeks to include the following interventions: Paraffin, Fluidotherapy, Modalities for pain management, US 3 mhz, Therapeutic exercises/activities., Strengthening, Edema Control, Scar  Management and Wound Care.        SHIELA Dickerson,  OTR/L, CHT  Occupational therapist, Certified Hand Therapist         SHIELA Dickerson, OTISA/MAYITO, CHT

## 2019-11-04 ENCOUNTER — PATIENT MESSAGE (OUTPATIENT)
Dept: HEMATOLOGY/ONCOLOGY | Facility: CLINIC | Age: 64
End: 2019-11-04

## 2019-11-05 ENCOUNTER — CLINICAL SUPPORT (OUTPATIENT)
Dept: REHABILITATION | Facility: HOSPITAL | Age: 64
End: 2019-11-05
Payer: COMMERCIAL

## 2019-11-05 ENCOUNTER — OFFICE VISIT (OUTPATIENT)
Dept: ORTHOPEDICS | Facility: CLINIC | Age: 64
End: 2019-11-05
Payer: COMMERCIAL

## 2019-11-05 VITALS
SYSTOLIC BLOOD PRESSURE: 127 MMHG | HEIGHT: 70 IN | WEIGHT: 199 LBS | HEART RATE: 62 BPM | DIASTOLIC BLOOD PRESSURE: 84 MMHG | BODY MASS INDEX: 28.49 KG/M2

## 2019-11-05 DIAGNOSIS — M25.60 STIFFNESS DUE TO IMMOBILITY: ICD-10-CM

## 2019-11-05 DIAGNOSIS — Z74.09 STIFFNESS DUE TO IMMOBILITY: ICD-10-CM

## 2019-11-05 DIAGNOSIS — R52 PAIN: ICD-10-CM

## 2019-11-05 DIAGNOSIS — C44.622 SQUAMOUS CELL CANCER OF SKIN OF FINGER, RIGHT: Primary | ICD-10-CM

## 2019-11-05 PROCEDURE — 97140 MANUAL THERAPY 1/> REGIONS: CPT | Mod: PO

## 2019-11-05 PROCEDURE — 99024 POSTOP FOLLOW-UP VISIT: CPT | Mod: S$GLB,,, | Performed by: PHYSICIAN ASSISTANT

## 2019-11-05 PROCEDURE — 99999 PR PBB SHADOW E&M-EST. PATIENT-LVL III: CPT | Mod: PBBFAC,,, | Performed by: PHYSICIAN ASSISTANT

## 2019-11-05 PROCEDURE — 99024 PR POST-OP FOLLOW-UP VISIT: ICD-10-PCS | Mod: S$GLB,,, | Performed by: PHYSICIAN ASSISTANT

## 2019-11-05 PROCEDURE — 99999 PR PBB SHADOW E&M-EST. PATIENT-LVL III: ICD-10-PCS | Mod: PBBFAC,,, | Performed by: PHYSICIAN ASSISTANT

## 2019-11-05 PROCEDURE — 97014 ELECTRIC STIMULATION THERAPY: CPT | Mod: PO

## 2019-11-05 PROCEDURE — 97110 THERAPEUTIC EXERCISES: CPT | Mod: PO

## 2019-11-05 NOTE — PROGRESS NOTES
Mr. Mcgrath is here today for a post-operative visit.  He is 43 days status post right small finger amputation by Dr. Tirado on 9/23/19.  He has previously undergone right small finger mass excision 9/9/19 and right small finger removal of nail with mass excision for diagnosis 8/19/19.  Initial excision showed right small finger nail bed squamous cell carcinoma in situ, cannot rule out invasion.  He reports that he is doing well, but continues to have pain and paresthesias.  He reported pain and paresthesias that radiate up the arm all the way to the shoulder and axilla, but mostly paresthesias in the hand.  He is attending occupational therapy, reports that it is going well. He has not yet seen Medical Oncology for evaluation and follow-up recommendations.  He denies fever, chills, and sweats since the time of the surgery.     PATHOLOGY 9/3/19:  FINAL PATHOLOGIC DIAGNOSIS  1. Skin and soft tissue, right small finger tip, excision:  - FOCAL DERMAL FIBROSIS AND REACTIVE CHRONIC INFLAMMATION.  - NEGATIVE FOR DYSPLASIA. NEGATIVE FOR IN SITU OR INVASIVE CARCINOMA.  MICROSCOPIC DESCRIPTION: Sections show an excision of skin, soft tissue, nail apparatus, and bone. There is  focal fibrosis and reactive chronic inflammation. There is no evidence of dysplasia. There is no evidence of in situ  or invasive carcinoma. Pancytokeratin immunohistochemical stain (AE1/AE3) and Ki 67 immunohistochemical  stain were performed on all 3 blocks: Pancytokeratin immunohistochemical stain fails to reveal invasive carcinoma.  Ki 67 immunohistochemical stain shows an elevated proliferative index limited to the lower levels of the involved  epithelium. Appropriately reactive controls were reviewed.  2. Skin, right small finger, excision:  - SKIN WITH NO SIGNIFICANT HISTOPATHOLOGIC ALTERATION.  - NEGATIVE FOR DYSPLASIA. NEGATIVE FOR IN SITU OR INVASIVE CARCINOMA.  MICROSCOPIC DESCRIPTION: Sections show acral skin with minimal chronic  "inflammation and no significant  histopathologic alteration. Pancytokeratin immunohistochemical stain (AE1/AE3) and Ki 67 immunohistochemical  stain were performed on all 4 blocks: Pancytokeratin immunohistochemical stain fails to reveal invasive carcinoma.  Ki 67 immunohistochemical stain shows an elevated proliferative index limited to the lower levels of the involved  epithelium. Appropriately reactive controls were reviewed.    Physical exam:    Vitals:    11/05/19 1114   BP: 127/84   Pulse: 62   Weight: 90.3 kg (199 lb)   Height: 5' 10" (1.778 m)   PainSc:   6     Vital signs are stable, patient is afebrile.  Patient is well dressed and well groomed, no acute distress.  Alert and oriented to person, place, and time.  Post op dressing taken down.  Incision is clean, dry, and intact; small scabs in place as well.  There is no erythema or exudate.  There is no sign of any infection. He is NVI- hypersensitivity at the small fingertip. Fair small finger ROM, poor ROM of the small fingers with joints isolated. Fair-good passive ROM.     Assessment: 43 days status post right small finger amputation    Plan:  Juan Luis was seen today for post-op evaluation.    Diagnoses and all orders for this visit:    Squamous cell cancer of skin of finger, right        - OT  - To Medical Oncology for consult/surveillence recommendations  - Discussed scar massage  - desensitization handout provided  - Follow up in 6 weeks   - Call with questions or concerns      "

## 2019-11-05 NOTE — PROGRESS NOTES
"                            Occupational Therapy Daily Treatment Note       Date: 11/5/2019  Name: Juan Luis Mcgrath  Clinic Number: [id number]    Therapy Diagnosis:   Encounter Diagnoses   Name Primary?    Stiffness due to immobility     Pain      Physician:Luna Tirado MD and Miladis Mccain PA     Physician Orders: Evaluate and treat ; 2x/wk x 6 wks , Modalities prn  Medical Diagnosis: C44.622 (ICD-10-CM) - Squamous cell skin cancer, finger, right  Evaluation Date: 10/15/2019  Insurance Authorization Expiration date : 12-31-19  Plan of Care Certification Period: 10-15-19 to 11-15-19  Date of Return to MD: 11-5-19     Visit # / Visits authorized: 2 / 20  Time In:1 pm   Time Out: 2 pm   Total Billable Time: 60 minutes     Precautions:  Standard and cancer      Subjective     Pt reports: " I have been working on my motion."    he was not compliant with home exercise program given last session.   Response to previous treatment: very stiff since last visit       Pain: 3/10  Location: right  Small finger       Objective    received the following supervised modalities after being cleared for contradictions for 10 minutes:   -  Juan Luis received moist heat  to right  Hand and coban sf down for fisting for 10 minutes to increase blood flow, circulation, pain management and for tissue elasticity prior to therex.     Patient receives TENS for pain control applied to volar wrist ,  pulse width = 150 uS, continuous current for 20 minutes.         Juan Luis   received the following manual therapy techniques to increase joint mobilization and soft tissue mobilization for 35 minutes:     - mini-vibrator on scars x 5 minutes, patient holing vib on volar hand     -Performed manual therapy techniques to small finger  area including joint mobilizations, grade for 35 minutes to increase joint mobility, ROM and for pain management.       Juan Luis  participated in dynamic functional therapeutic exercises to improve functional " performance while increasing strength, endurance, ROM,  and flexibility  for 10  minutes, including:  - gripping  -PIP and MP blocking  - reverse blocking  - tge  - isometric extension with CHT blocking   - beans in bag with ESTIM    Pre session:   MP flexion : 91  PIP flexion: 76      Post session:   MP flexion: 89  PIP flexion: 91      Home Exercises and Education Provided     Education provided:  - discussed insurance limitation  - Progress towards goals     Written Home Exercises Provided: Patient instructed to cont prior HEP.  Exercises were reviewed and Juan Luis was able to demonstrate them prior to the end of the session.  Juan Luis demonstrated good  understanding of the HEP provided.   .   See EMR under Patient Instructions for exercises provided 10/31/19 with red sponge for gripping and red putty for highligther pulls.       Assessment     Pt would continue to benefit from skilled OT. Pt supplied putty for gripping and red sponge. Pt with healing scar . Improved motion noted. Concentrated on desensitization today.     Juan Luis is progressing well towards his goals and there are no updates to goals at this time. Pt prognosis is Good.   The patient demonstrated proper understanding  of each exercise.Pt required verbal and tactile cues for HEP and education with use of finger motion and use.  Pt  will continue to benefit from skilled OT intervention.     Anticipated barriers to occupational therapy: cancer    Pt's spiritual, cultural and educational needs considered and pt agreeable to plan of care and goals.    Goals:  Goals to be met in 4  weeks:     1) Patient to be IND with HEP and modalities for pain managment.  2) Patient will  Increase Active Range of motion 15-20 degrees in hand/wrist to increase functional hand use for ADLs/work/leisure activities.  3)Pt will increase  strength 10-20 lbs. to improve functional grasp for ADLs/work/leisure activities.         Plan      Plan     Certification Period/Plan of care  expiration: 10/15/2019 to 11/15/2019.     Outpatient Occupational Therapy 1 times weekly for 4 weeks to include the following interventions: Paraffin, Fluidotherapy, Modalities for pain management, US 3 mhz, Therapeutic exercises/activities., Strengthening, Edema Control, Scar Management and Wound Care.        SHIELA Dickerson,  OTR/L, CHT  Occupational therapist, Certified Hand Therapist         SHIELA Dickerson, OTR/MAYITO, CHT

## 2019-11-07 PROBLEM — R22.30 MASS OF FINGER: Status: RESOLVED | Noted: 2019-08-19 | Resolved: 2019-11-07

## 2019-11-07 PROBLEM — C44.622: Status: ACTIVE | Noted: 2019-09-23

## 2019-11-11 LAB
ACID FAST MOD KINY STN SPEC: NORMAL
MYCOBACTERIUM SPEC QL CULT: NORMAL

## 2019-11-18 DIAGNOSIS — R20.0 HAND NUMBNESS: Primary | ICD-10-CM

## 2019-11-18 NOTE — PROGRESS NOTES
"Per pt email "Good morning. This past week I have had increasing paresthesias and intermittent numbing of middle, ring and index of my right hand. It is worse in the morning then improves throughout the day, but now the paresthesia is continuous.   I am concerned because either a neuroma is forming or I am developing a complication from the nerve block for the procedure. It was an extremely painful block.   Any thoughts? "    Will obtain EMG, query possible carpal tunnel. If not carpal tunnel, will plan for referral to Neurology.     Jazmin Hammond PA-C  [facility]   [facility] [place]      "

## 2019-11-19 ENCOUNTER — TELEPHONE (OUTPATIENT)
Dept: ORTHOPEDICS | Facility: CLINIC | Age: 64
End: 2019-11-19

## 2019-11-21 ENCOUNTER — TELEPHONE (OUTPATIENT)
Dept: ORTHOPEDICS | Facility: CLINIC | Age: 64
End: 2019-11-21

## 2019-11-21 NOTE — TELEPHONE ENCOUNTER
Attempted to call pt to schedule appts. Lm on vm asking pt to return call at earliest convenience.

## 2019-11-22 ENCOUNTER — CLINICAL SUPPORT (OUTPATIENT)
Dept: REHABILITATION | Facility: HOSPITAL | Age: 64
End: 2019-11-22
Payer: COMMERCIAL

## 2019-11-22 DIAGNOSIS — R52 PAIN: ICD-10-CM

## 2019-11-22 DIAGNOSIS — M25.60 STIFFNESS DUE TO IMMOBILITY: ICD-10-CM

## 2019-11-22 DIAGNOSIS — Z74.09 STIFFNESS DUE TO IMMOBILITY: ICD-10-CM

## 2019-11-22 PROCEDURE — 97110 THERAPEUTIC EXERCISES: CPT | Mod: PO

## 2019-11-22 PROCEDURE — 97022 WHIRLPOOL THERAPY: CPT | Mod: PO

## 2019-11-22 NOTE — PROGRESS NOTES
"                            Occupational Therapy Daily Treatment Note       Date: 11/22/2019  Name: Juan Luis Mcgrath  Clinic Number: [id number]    Therapy Diagnosis:   Encounter Diagnoses   Name Primary?    Stiffness due to immobility     Pain      Physician:Luna Tirado MD and Mialdis Mccain PA     Physician Orders: Evaluate and treat ; 2x/wk x 6 wks , Modalities prn  Medical Diagnosis: C44.622 (ICD-10-CM) - Squamous cell skin cancer, finger, right  Evaluation Date: 10/15/2019  Insurance Authorization Expiration date : 12-31-19  Plan of Care Certification Period: 10-15-19 to 11-15-19  Date of Return to MD: 11-5-19     Visit # / Visits authorized: 3 / 20  Time In:10 pm   Time Out: 11 pm   Total Billable Time: 60 minutes     Precautions:  Standard and cancer      Subjective     Pt reports: " I can move my hand better nowo ."    he was not compliant with home exercise program given last session.   Response to previous treatment: very stiff since last visit       Pain: 3/10  Location: right  Small finger       Objective    received the following supervised modalities after being cleared for contradictions for 10 minutes:   -  Juan Luis received moist heat  to right  Hand and coban sf down for fisting for 10 minutes to increase blood flow, circulation, pain management and for tissue elasticity prior to therex.        Juan Luis   received the following manual therapy techniques to increase joint mobilization and soft tissue mobilization for 35 minutes:     - mini-vibrator on scars x 5 minutes, patient holing vib on volar hand     -Performed manual therapy techniques to small finger  area including joint mobilizations, grade for 35 minutes to increase joint mobility, ROM and for pain management.       Juan Luis  participated in dynamic functional therapeutic exercises to improve functional performance while increasing strength, endurance, ROM,  and flexibility  for 10  minutes, including:  - gripping  -PIP and MP " blocking  - reverse blocking  - tge  - isometric extension with CHT blocking   - beans and towel in bang for desensitzation    Pre session:   MP flexion : 92  PIP flexion: 80      Post session:   Full motion post session    .Patient received fluidotherapy to right  hand(s) for 8 minutes to increase blood flow, circulation, desensitization, sensory re-education and for pain management.         Home Exercises and Education Provided     Education provided:  - discussed insurance limitation  - Progress towards goals     Written Home Exercises Provided: Patient instructed to cont prior HEP.  Exercises were reviewed and Juan Luis was able to demonstrate them prior to the end of the session.  Juan Luis demonstrated good  understanding of the HEP provided.   .   See EMR under Patient Instructions for exercises provided 10/31/19 with red sponge for gripping and red putty for highligther pulls.       Assessment     Pt would continue to benefit from skilled OT. Pt positives for tinel's , supplied finger silicone sleeve to assist in molding, he has been performing desensitization. Arrived with improved fisting.  Sensation improved- did not require ESTIM today.    Juan Luis is progressing well towards his goals and there are no updates to goals at this time. Pt prognosis is Good.   The patient demonstrated proper understanding  of each exercise.Pt required verbal and tactile cues for HEP and education with use of finger motion and use.  Pt  will continue to benefit from skilled OT intervention.     Anticipated barriers to occupational therapy: cancer    Pt's spiritual, cultural and educational needs considered and pt agreeable to plan of care and goals.    Goals:  Goals to be met in 4  weeks:     1) Patient to be IND with HEP and modalities for pain managment.  2) Patient will  Increase Active Range of motion 15-20 degrees in hand/wrist to increase functional hand use for ADLs/work/leisure activities.  3)Pt will increase  strength 10-20  lbs. to improve functional grasp for ADLs/work/leisure activities.         Plan      Plan     Certification Period/Plan of care expiration: 10/15/2019 to 11/15/2019.     Outpatient Occupational Therapy 1 times weekly for 4 weeks to include the following interventions: Paraffin, Fluidotherapy, Modalities for pain management, US 3 mhz, Therapeutic exercises/activities., Strengthening, Edema Control, Scar Management and Wound Care.        SHIELA Dickerson,  OTR/L, CHT  Occupational therapist, Certified Hand Therapist         SHIELA Dickerson, OTR/L, CHT

## 2019-11-25 ENCOUNTER — TELEPHONE (OUTPATIENT)
Dept: HEMATOLOGY/ONCOLOGY | Facility: CLINIC | Age: 64
End: 2019-11-25

## 2019-11-26 ENCOUNTER — TELEPHONE (OUTPATIENT)
Dept: ORTHOPEDICS | Facility: CLINIC | Age: 64
End: 2019-11-26

## 2019-11-26 NOTE — TELEPHONE ENCOUNTER
----- Message from Mattie Savage LPN sent at 11/22/2019  4:04 PM CST -----      ----- Message -----  From: Jazmin Hammond PA-C  Sent: 11/18/2019   3:28 PM CST  To: Candida PIÑA Staff    I ordered an EMG can we call pt and schedule and schedule f/u with Dr. Tirado for results  Thanks!

## 2019-11-29 ENCOUNTER — CLINICAL SUPPORT (OUTPATIENT)
Dept: REHABILITATION | Facility: HOSPITAL | Age: 64
End: 2019-11-29
Payer: COMMERCIAL

## 2019-11-29 DIAGNOSIS — M25.60 STIFFNESS DUE TO IMMOBILITY: ICD-10-CM

## 2019-11-29 DIAGNOSIS — R52 PAIN: ICD-10-CM

## 2019-11-29 DIAGNOSIS — Z74.09 STIFFNESS DUE TO IMMOBILITY: ICD-10-CM

## 2019-11-29 PROCEDURE — 97110 THERAPEUTIC EXERCISES: CPT | Mod: PO

## 2019-11-29 PROCEDURE — 97022 WHIRLPOOL THERAPY: CPT | Mod: PO

## 2019-11-29 NOTE — PROGRESS NOTES
"                            Occupational Therapy Daily Treatment Note       Date: 11/29/2019  Name: Juan Luis Mcgrath  Clinic Number: [id number]    Therapy Diagnosis:   Encounter Diagnoses   Name Primary?    Stiffness due to immobility     Pain      Physician:Luna Tirado MD and Miladis Mccain PA     Physician Orders: Evaluate and treat ; 2x/wk x 6 wks , Modalities prn  Medical Diagnosis: C44.622 (ICD-10-CM) - Squamous cell skin cancer, finger, right  Evaluation Date: 10/15/2019  Insurance Authorization Expiration date : 12-31-19  Plan of Care Certification Period: 10-15-19 to 11-15-19  Date of Return to MD: 11-5-19     Visit # / Visits authorized:4 / 20  Time In:1 pm   Time Out: 2  pm   Total Billable Time: 60 minutes     Precautions:  Standard and cancer      Subjective     Pt reports: " I can move my hand better nowo ."    he was not compliant with home exercise program given last session.   Response to previous treatment: very stiff since last visit       Pain: 3/10  Location: right  Small finger       Objective    received the following supervised modalities after being cleared for contradictions for 10 minutes:   -  Juan Luis received moist heat  to right  Hand and coban sf down for fisting for 10 minutes to increase blood flow, circulation, pain management and for tissue elasticity prior to therex.        Juan Luis   received the following manual therapy techniques to increase joint mobilization and soft tissue mobilization for 35 minutes:     - mini-vibrator on scars x 5 minutes, patient holing vib on volar hand     -Performed manual therapy techniques to small finger  area including joint mobilizations, grade for 35 minutes to increase joint mobility, ROM and for pain management.       Juan Luis  participated in dynamic functional therapeutic exercises to improve functional performance while increasing strength, endurance, ROM,  and flexibility  for 10  minutes, including:  - gripping  -PIP and MP " blocking  - reverse blocking  - tge  - isometric extension with CHT blocking   - beans and towel in bang for desensitzation    Pre session:   MP flexion : 92  PIP flexion: 80          .Patient received fluidotherapy to right  hand(s) for 8 minutes to increase blood flow, circulation, desensitization, sensory re-education and for pain management.         Home Exercises and Education Provided     Education provided:  - discussed insurance limitation  - Progress towards goals     Written Home Exercises Provided: Patient instructed to cont prior HEP.  Exercises were reviewed and Juan Luis was able to demonstrate them prior to the end of the session.  Juan Luis demonstrated good  understanding of the HEP provided.   .   See EMR under Patient Instructions for exercises provided 10/31/19 with red sponge for gripping and red putty for highligther pulls.       Assessment     Pt would continue to benefit from skilled OT.  The silicone sleeve to assist in molding is helping.     Juan Luis is progressing well towards his goals and there are no updates to goals at this time. Pt prognosis is Good.   The patient demonstrated proper understanding  of each exercise.Pt required verbal and tactile cues for HEP and education with use of finger motion and use.  Pt  will continue to benefit from skilled OT intervention.     Anticipated barriers to occupational therapy: cancer    Pt's spiritual, cultural and educational needs considered and pt agreeable to plan of care and goals.    Goals:  Goals to be met in 4  weeks:     1) Patient to be IND with HEP and modalities for pain managment.  2) Patient will  Increase Active Range of motion 15-20 degrees in hand/wrist to increase functional hand use for ADLs/work/leisure activities.  3)Pt will increase  strength 10-20 lbs. to improve functional grasp for ADLs/work/leisure activities.         Plan      Plan     Certification Period/Plan of care expiration: 10/15/2019 to 11/15/2019.     Outpatient  Occupational Therapy 1 times weekly for 4 weeks to include the following interventions: Paraffin, Fluidotherapy, Modalities for pain management, US 3 mhz, Therapeutic exercises/activities., Strengthening, Edema Control, Scar Management and Wound Care.        SHIELA Dickerson,  OTR/L, CHT  Occupational therapist, Certified Hand Therapist         SHIELA Dickerson, OTR/MAYITO, CHT

## 2019-12-02 ENCOUNTER — TELEPHONE (OUTPATIENT)
Dept: NEUROLOGY | Facility: CLINIC | Age: 64
End: 2019-12-02

## 2019-12-02 NOTE — TELEPHONE ENCOUNTER
Spoke to patient and attempted to schedule EMG.  Patient was unable to make appt.at this time but said he will look at his schedule and call to make appt. When available. ST

## 2019-12-16 ENCOUNTER — CLINICAL SUPPORT (OUTPATIENT)
Dept: REHABILITATION | Facility: HOSPITAL | Age: 64
End: 2019-12-16
Payer: COMMERCIAL

## 2019-12-16 DIAGNOSIS — R52 PAIN: ICD-10-CM

## 2019-12-16 DIAGNOSIS — Z74.09 STIFFNESS DUE TO IMMOBILITY: ICD-10-CM

## 2019-12-16 DIAGNOSIS — M25.60 STIFFNESS DUE TO IMMOBILITY: ICD-10-CM

## 2019-12-16 PROCEDURE — 97110 THERAPEUTIC EXERCISES: CPT | Mod: PO

## 2019-12-16 NOTE — PROGRESS NOTES
"                            Occupational Therapy Daily Treatment Note       Date: 12/16/2019  Name: Juan Luis Mcgrath  Clinic Number: [id number]    Therapy Diagnosis:   Encounter Diagnoses   Name Primary?    Stiffness due to immobility     Pain      Physician:Luna Tirado MD and Miladis Mccain PA     Physician Orders: Evaluate and treat ; 2x/wk x 6 wks , Modalities prn  Medical Diagnosis: C44.622 (ICD-10-CM) - Squamous cell skin cancer, finger, right  Evaluation Date: 10/15/2019  Insurance Authorization Expiration date : 12-31-19  Plan of Care Certification Period: 10-15-19 to 11-15-19  Date of Return to MD: 11-5-19     Visit # / Visits authorized:4 / 20  Time In:12 am   Time Out: 1 pm   Total Billable Time: 60 minutes     Precautions:  Standard and cancer      Subjective     Pt reports: I have been  Doing my exercises, I do a gripping."    he was not compliant with home exercise program given last session.   Response to previous treatment: very stiff since last visit       Pain: 3/10  Location: carpal tunnel pain     Objective    received the following supervised modalities after being cleared for contradictions for 10 minutes:   -  Juan Luis received moist heat  to right  Hand and coban sf down for fisting for 10 minutes to increase blood flow, circulation, pain management and for tissue elasticity prior to therex.        Juan Luis   received the following manual therapy techniques to increase joint mobilization and soft tissue mobilization for 35 minutes:     - mini-vibrator on scars x 5 minutes, patient holing vib on volar hand     -Performed manual therapy techniques to small finger  area including joint mobilizations, grade for 35 minutes to increase joint mobility, ROM and for pain management.       Juan Luis  participated in dynamic functional therapeutic exercises to improve functional performance while increasing strength, endurance, ROM,  and flexibility  for 10  minutes, including:  - gripping  -PIP and " MP blocking  - reverse blocking  - tge  - isometric extension with CHT blocking   - beans and towel in bang for desensitzation    Pre session:   MP flexion : full  PIP flexion: 85    Post session: full motion   Strength: (SENDY Dynamometer in lbs.) Average 3 trials, Position II:     12/16/2019 12/16/2019   Rung2 Right  Left   SENDY # 2 63# 80#             .Patient received fluidotherapy to right  hand(s) for 8 minutes to increase blood flow, circulation, desensitization, sensory re-education and for pain management.         Home Exercises and Education Provided     Education provided:  - discussed insurance limitation  - Progress towards goals     Written Home Exercises Provided: Patient instructed to cont prior HEP.  Exercises were reviewed and Juan Luis was able to demonstrate them prior to the end of the session.  Juan Luis demonstrated good  understanding of the HEP provided.   .   See EMR under Patient Instructions for exercises provided 10/31/19 with red sponge for gripping and red putty for highligther pulls.       Assessment     Pt would continue to benefit from skilled OT.     Juan Luis is progressing well towards his goals and there are no updates to goals at this time. Pt prognosis is Good.   The patient demonstrated proper understanding  of each exercise.Pt required verbal and tactile cues for HEP and education with use of finger motion and use.  Pt  will continue to benefit from skilled OT intervention.     Anticipated barriers to occupational therapy: cancer    Pt's spiritual, cultural and educational needs considered and pt agreeable to plan of care and goals.    Goals:  Goals to be met in 4  weeks:     1) Patient to be IND with HEP and modalities for pain managment.  2) Patient will  Increase Active Range of motion 15-20 degrees in hand/wrist to increase functional hand use for ADLs/work/leisure activities.  3)Pt will increase  strength 10-20 lbs. to improve functional grasp for ADLs/work/leisure  activities.         Plan      Plan     Certification Period/Plan of care expiration: 10/15/2019 to 11/15/2019.     Outpatient Occupational Therapy 1 times weekly for 4 weeks to include the following interventions: Paraffin, Fluidotherapy, Modalities for pain management, US 3 mhz, Therapeutic exercises/activities., Strengthening, Edema Control, Scar Management and Wound Care.        SHIELA Dickerson,  OTR/L, CHT  Occupational therapist, Certified Hand Therapist         SHIELA Dickerson, OTR/MAYITO, CHT

## 2019-12-17 ENCOUNTER — DOCUMENTATION ONLY (OUTPATIENT)
Dept: ORTHOPEDICS | Facility: CLINIC | Age: 64
End: 2019-12-17

## 2019-12-26 ENCOUNTER — TELEPHONE (OUTPATIENT)
Dept: INTERNAL MEDICINE | Facility: CLINIC | Age: 64
End: 2019-12-26

## 2019-12-26 DIAGNOSIS — Z00.00 ANNUAL PHYSICAL EXAM: Primary | ICD-10-CM

## 2019-12-26 DIAGNOSIS — Z12.5 PROSTATE CANCER SCREENING: ICD-10-CM

## 2019-12-26 NOTE — TELEPHONE ENCOUNTER
----- Message from Aram Kimbrough sent at 12/26/2019  9:12 AM CST -----  Contact: Patient   Doctor appointment and lab have been scheduled.  Please link lab orders to the lab appointment.  Date of doctor appointment:    Physical or EP:  12/31/19  Date of lab appointment:    Comments:     Thank you

## 2019-12-30 ENCOUNTER — TELEPHONE (OUTPATIENT)
Dept: INTERNAL MEDICINE | Facility: CLINIC | Age: 64
End: 2019-12-30

## 2019-12-30 ENCOUNTER — LAB VISIT (OUTPATIENT)
Dept: LAB | Facility: HOSPITAL | Age: 64
End: 2019-12-30
Attending: INTERNAL MEDICINE
Payer: COMMERCIAL

## 2019-12-30 DIAGNOSIS — Z12.5 PROSTATE CANCER SCREENING: ICD-10-CM

## 2019-12-30 DIAGNOSIS — Z00.00 ANNUAL PHYSICAL EXAM: ICD-10-CM

## 2019-12-30 DIAGNOSIS — Z85.46 HISTORY OF PROSTATE CANCER: Primary | ICD-10-CM

## 2019-12-30 LAB
ALBUMIN SERPL BCP-MCNC: 3.8 G/DL (ref 3.5–5.2)
ALP SERPL-CCNC: 61 U/L (ref 55–135)
ALT SERPL W/O P-5'-P-CCNC: 29 U/L (ref 10–44)
ANION GAP SERPL CALC-SCNC: 8 MMOL/L (ref 8–16)
AST SERPL-CCNC: 27 U/L (ref 10–40)
BASOPHILS # BLD AUTO: 0.03 K/UL (ref 0–0.2)
BASOPHILS NFR BLD: 0.6 % (ref 0–1.9)
BILIRUB SERPL-MCNC: 0.8 MG/DL (ref 0.1–1)
BUN SERPL-MCNC: 12 MG/DL (ref 8–23)
CALCIUM SERPL-MCNC: 9 MG/DL (ref 8.7–10.5)
CHLORIDE SERPL-SCNC: 107 MMOL/L (ref 95–110)
CHOLEST SERPL-MCNC: 211 MG/DL (ref 120–199)
CHOLEST/HDLC SERPL: 3.6 {RATIO} (ref 2–5)
CO2 SERPL-SCNC: 25 MMOL/L (ref 23–29)
COMPLEXED PSA SERPL-MCNC: 0.02 NG/ML (ref 0–4)
COMPLEXED PSA SERPL-MCNC: 0.02 NG/ML (ref 0–4)
CREAT SERPL-MCNC: 0.9 MG/DL (ref 0.5–1.4)
DIFFERENTIAL METHOD: ABNORMAL
EOSINOPHIL # BLD AUTO: 0.6 K/UL (ref 0–0.5)
EOSINOPHIL NFR BLD: 12.2 % (ref 0–8)
ERYTHROCYTE [DISTWIDTH] IN BLOOD BY AUTOMATED COUNT: 13.2 % (ref 11.5–14.5)
EST. GFR  (AFRICAN AMERICAN): >60 ML/MIN/1.73 M^2
EST. GFR  (NON AFRICAN AMERICAN): >60 ML/MIN/1.73 M^2
ESTIMATED AVG GLUCOSE: 108 MG/DL (ref 68–131)
GLUCOSE SERPL-MCNC: 95 MG/DL (ref 70–110)
HBA1C MFR BLD HPLC: 5.4 % (ref 4–5.6)
HCT VFR BLD AUTO: 46.7 % (ref 40–54)
HDLC SERPL-MCNC: 59 MG/DL (ref 40–75)
HDLC SERPL: 28 % (ref 20–50)
HGB BLD-MCNC: 15.3 G/DL (ref 14–18)
IMM GRANULOCYTES # BLD AUTO: 0.02 K/UL (ref 0–0.04)
IMM GRANULOCYTES NFR BLD AUTO: 0.4 % (ref 0–0.5)
LDLC SERPL CALC-MCNC: 131.2 MG/DL (ref 63–159)
LYMPHOCYTES # BLD AUTO: 1.3 K/UL (ref 1–4.8)
LYMPHOCYTES NFR BLD: 25.9 % (ref 18–48)
MCH RBC QN AUTO: 31.5 PG (ref 27–31)
MCHC RBC AUTO-ENTMCNC: 32.8 G/DL (ref 32–36)
MCV RBC AUTO: 96 FL (ref 82–98)
MONOCYTES # BLD AUTO: 0.4 K/UL (ref 0.3–1)
MONOCYTES NFR BLD: 8.2 % (ref 4–15)
NEUTROPHILS # BLD AUTO: 2.7 K/UL (ref 1.8–7.7)
NEUTROPHILS NFR BLD: 52.7 % (ref 38–73)
NONHDLC SERPL-MCNC: 152 MG/DL
NRBC BLD-RTO: 0 /100 WBC
PLATELET # BLD AUTO: 237 K/UL (ref 150–350)
PLATELET BLD QL SMEAR: ABNORMAL
PMV BLD AUTO: 10.2 FL (ref 9.2–12.9)
POTASSIUM SERPL-SCNC: 4.1 MMOL/L (ref 3.5–5.1)
PROT SERPL-MCNC: 7 G/DL (ref 6–8.4)
RBC # BLD AUTO: 4.85 M/UL (ref 4.6–6.2)
SODIUM SERPL-SCNC: 140 MMOL/L (ref 136–145)
TRIGL SERPL-MCNC: 104 MG/DL (ref 30–150)
WBC # BLD AUTO: 5.1 K/UL (ref 3.9–12.7)

## 2019-12-30 PROCEDURE — 84153 ASSAY OF PSA TOTAL: CPT | Mod: 91

## 2019-12-30 PROCEDURE — 85025 COMPLETE CBC W/AUTO DIFF WBC: CPT

## 2019-12-30 PROCEDURE — 36415 COLL VENOUS BLD VENIPUNCTURE: CPT

## 2019-12-30 PROCEDURE — 84153 ASSAY OF PSA TOTAL: CPT

## 2019-12-30 PROCEDURE — 80061 LIPID PANEL: CPT

## 2019-12-30 PROCEDURE — 80053 COMPREHEN METABOLIC PANEL: CPT

## 2019-12-30 PROCEDURE — 83036 HEMOGLOBIN GLYCOSYLATED A1C: CPT

## 2019-12-30 NOTE — TELEPHONE ENCOUNTER
----- Message from Lisseth Magallanes sent at 12/30/2019 10:41 AM CST -----  Contact: Patient [phone]  Stated that he needs a special PSA to be drawn and the order needs to be put  In. He is at the lab right now.    Please call and advise.    Thank You

## 2019-12-31 ENCOUNTER — TELEPHONE (OUTPATIENT)
Dept: INTERNAL MEDICINE | Facility: CLINIC | Age: 64
End: 2019-12-31

## 2020-01-06 ENCOUNTER — TELEPHONE (OUTPATIENT)
Dept: PHYSICAL MEDICINE AND REHAB | Facility: CLINIC | Age: 65
End: 2020-01-06

## 2020-01-06 ENCOUNTER — TELEPHONE (OUTPATIENT)
Dept: INTERNAL MEDICINE | Facility: CLINIC | Age: 65
End: 2020-01-06

## 2020-01-06 NOTE — TELEPHONE ENCOUNTER
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 And he can get Shingrix through the pharmacy but I'm sure she can give him the orange card.

## 2020-01-06 NOTE — TELEPHONE ENCOUNTER
----- Message from Simin Flores sent at 1/6/2020  1:58 PM CST -----  Contact: EXT [phone]  Patient is requesting to have a Xoster vaccine on his appt he has on 1/8.  Please advise,thanks

## 2020-01-06 NOTE — TELEPHONE ENCOUNTER
Spoke with pt he states he requested a call from someone who schedules EMG's for [facility] not Dr. Goel's office. Provided pt with EMG 's number ext [phone].    ----- Message from Madonna Luu sent at 1/6/2020  2:01 PM CST -----  Contact: [phone]  Pt called in to speak with someone in Dr. Bai office regarding EMG location. Please call back

## 2020-01-08 ENCOUNTER — IMMUNIZATION (OUTPATIENT)
Dept: PHARMACY | Facility: CLINIC | Age: 65
End: 2020-01-08
Payer: COMMERCIAL

## 2020-01-08 ENCOUNTER — OFFICE VISIT (OUTPATIENT)
Dept: INTERNAL MEDICINE | Facility: CLINIC | Age: 65
End: 2020-01-08
Payer: COMMERCIAL

## 2020-01-08 ENCOUNTER — CLINICAL SUPPORT (OUTPATIENT)
Dept: REHABILITATION | Facility: HOSPITAL | Age: 65
End: 2020-01-08
Payer: COMMERCIAL

## 2020-01-08 VITALS
BODY MASS INDEX: 28.91 KG/M2 | DIASTOLIC BLOOD PRESSURE: 70 MMHG | SYSTOLIC BLOOD PRESSURE: 108 MMHG | HEIGHT: 70 IN | HEART RATE: 53 BPM | OXYGEN SATURATION: 97 % | WEIGHT: 201.94 LBS

## 2020-01-08 DIAGNOSIS — Z12.11 COLON CANCER SCREENING: ICD-10-CM

## 2020-01-08 DIAGNOSIS — Z74.09 STIFFNESS DUE TO IMMOBILITY: Primary | ICD-10-CM

## 2020-01-08 DIAGNOSIS — Z85.46 HISTORY OF PROSTATE CANCER: ICD-10-CM

## 2020-01-08 DIAGNOSIS — S68.119S AMPUTATION OF FINGER, SEQUELA: ICD-10-CM

## 2020-01-08 DIAGNOSIS — R79.89 ABNORMAL CBC: ICD-10-CM

## 2020-01-08 DIAGNOSIS — M25.60 STIFFNESS DUE TO IMMOBILITY: Primary | ICD-10-CM

## 2020-01-08 DIAGNOSIS — Z00.00 ANNUAL PHYSICAL EXAM: Primary | ICD-10-CM

## 2020-01-08 PROBLEM — C44.622: Status: RESOLVED | Noted: 2019-09-23 | Resolved: 2020-01-08

## 2020-01-08 PROBLEM — R52 PAIN: Status: RESOLVED | Noted: 2019-10-15 | Resolved: 2020-01-08

## 2020-01-08 PROBLEM — S68.119A AMPUTATION OF FINGER: Status: ACTIVE | Noted: 2020-01-08

## 2020-01-08 PROBLEM — M25.512 LEFT SHOULDER PAIN: Status: RESOLVED | Noted: 2017-05-02 | Resolved: 2020-01-08

## 2020-01-08 PROBLEM — M25.519 SHOULDER PAIN: Status: RESOLVED | Noted: 2017-05-02 | Resolved: 2020-01-08

## 2020-01-08 PROCEDURE — 97110 THERAPEUTIC EXERCISES: CPT | Mod: PO

## 2020-01-08 PROCEDURE — 99999 PR PBB SHADOW E&M-EST. PATIENT-LVL III: ICD-10-PCS | Mod: PBBFAC,,, | Performed by: PHYSICIAN ASSISTANT

## 2020-01-08 PROCEDURE — 97150 GROUP THERAPEUTIC PROCEDURES: CPT | Mod: PO

## 2020-01-08 PROCEDURE — 99396 PR PREVENTIVE VISIT,EST,40-64: ICD-10-PCS | Mod: S$GLB,,, | Performed by: PHYSICIAN ASSISTANT

## 2020-01-08 PROCEDURE — 99396 PREV VISIT EST AGE 40-64: CPT | Mod: S$GLB,,, | Performed by: PHYSICIAN ASSISTANT

## 2020-01-08 PROCEDURE — 99999 PR PBB SHADOW E&M-EST. PATIENT-LVL III: CPT | Mod: PBBFAC,,, | Performed by: PHYSICIAN ASSISTANT

## 2020-01-08 PROCEDURE — 97014 ELECTRIC STIMULATION THERAPY: CPT | Mod: PO

## 2020-01-08 RX ORDER — VALACYCLOVIR HYDROCHLORIDE 1 G/1
TABLET, FILM COATED ORAL
COMMUNITY
Start: 2019-12-22 | End: 2022-10-20

## 2020-01-08 NOTE — PROGRESS NOTES
Subjective:       Patient ID: Juan Luis Mcgrath is a 64 y.o. male.    Chief Complaint: Annual Exam    Patient presents for annual exam.  He has already completed his blood work which is listed below.  He is due for shingle vaccine, pneumonia vaccine, screening colonoscopy and tetanus.  He wishes to defer the tetanus vaccine.  He would like to do a fit kit instead of colonoscopy.  He is having some trouble with his carpal tunnel but is being followed by Orthopedics for this.  His CBC done for his annual exam was slightly abnormal with an elevation and eosinophils.  His Primary Care Physician would like him to repeat the blood work and if still elevated see Hematology.  No complaints today.    Lab Results   Component Value Date    WBC 5.10 12/30/2019    HGB 15.3 12/30/2019    HCT 46.7 12/30/2019     12/30/2019    CHOL 211 (H) 12/30/2019    TRIG 104 12/30/2019    HDL 59 12/30/2019    ALT 29 12/30/2019    AST 27 12/30/2019     12/30/2019    K 4.1 12/30/2019     12/30/2019    CREATININE 0.9 12/30/2019    BUN 12 12/30/2019    CO2 25 12/30/2019    TSH 1.776 09/01/2016    PSA 0.02 12/30/2019    HGBA1C 5.4 12/30/2019       Review of Systems   Constitutional: Negative for activity change, appetite change, chills, fatigue, fever and unexpected weight change.   HENT: Negative.    Eyes: Negative for photophobia, pain, discharge, redness and visual disturbance.   Respiratory: Negative for cough, chest tightness and shortness of breath.    Cardiovascular: Negative for chest pain, palpitations and leg swelling.   Gastrointestinal: Negative for abdominal distention, abdominal pain, blood in stool, constipation, diarrhea, nausea and vomiting.   Genitourinary: Negative for decreased urine volume, difficulty urinating, dysuria, frequency, hematuria and urgency.   Musculoskeletal: Positive for arthralgias. Negative for back pain, gait problem and myalgias.   Neurological: Negative for dizziness, weakness,  light-headedness, numbness and headaches.   Psychiatric/Behavioral: Negative.        Objective:      Physical Exam   Constitutional: He is oriented to person, place, and time. He appears well-developed and well-nourished. No distress.   HENT:   Head: Normocephalic and atraumatic.   Right Ear: External ear normal.   Left Ear: External ear normal.   Mouth/Throat: No oropharyngeal exudate.   Eyes: Pupils are equal, round, and reactive to light. Conjunctivae and EOM are normal. Right eye exhibits no discharge. Left eye exhibits no discharge.   Neck: Normal range of motion. Neck supple. No thyromegaly present.   Cardiovascular: Normal rate, regular rhythm and normal heart sounds. Exam reveals no gallop and no friction rub.   No murmur heard.  Pulmonary/Chest: Effort normal and breath sounds normal. No respiratory distress. He has no wheezes. He has no rales. He exhibits no tenderness.   Abdominal: Soft. Bowel sounds are normal. He exhibits no distension and no mass. There is no tenderness. There is no guarding.   Musculoskeletal: Normal range of motion.   Lymphadenopathy:     He has no cervical adenopathy.   Neurological: He is alert and oriented to person, place, and time.   Skin: Skin is warm and dry. He is not diaphoretic.   Psychiatric: He has a normal mood and affect. His behavior is normal. Judgment and thought content normal.       Assessment:       1. Annual physical exam    2. Abnormal CBC    3. Amputation of finger, sequela    4. History of prostate cancer    5. Colon cancer screening        Plan:       Juan Luis was seen today for annual exam.    Diagnoses and all orders for this visit:    Annual physical exam  Comments:  Patient given orders for shingles and pneumonia vaccines    Abnormal CBC  Comments:  Patient's primary care physician would like to repeat the CBC due to recent elevation in eosinophils  Orders:  -     CBC auto differential; Future    Amputation of finger, sequela  Comments:  Patient still having  some pain but overall doing well.    History of prostate cancer  Comments:  In remission, continue to monitor    Colon cancer screening  Comments:  Fit kit given  Orders:  -     Fecal Immunochemical Test (iFOBT); Future     Will call patient with call results.  Discussed risk with cholesterol the patient wished to defer statin.

## 2020-01-08 NOTE — PROGRESS NOTES
"                            Occupational Therapy Daily Treatment Note       Date: 1/8/2020  Name: Juan Luis Mcgrath  Clinic Number: [id number]    Therapy Diagnosis:   Encounter Diagnosis   Name Primary?    Stiffness due to immobility Yes     Physician:Luna Tirado MD and Miladis Mccain PA     Physician Orders: Evaluate and treat ; 2x/wk x 6 wks , Modalities prn  Medical Diagnosis: C44.622 (ICD-10-CM) - Squamous cell skin cancer, finger, right  Evaluation Date: 10/15/2019  Insurance Authorization Expiration date : 12-31-19  Plan of Care Certification Period: 10-15-19 to 11-15-19  Date of Return to MD: 11-5-19     Visit # / Visits authorized:1 / 20  Time In:11 am   Time Out: 12:20 pm   Total Billable Time: 80 minutes     Precautions:  Standard and cancer      Subjective     Pt reports: I think it is getting better."    he was not compliant with home exercise program given last session.   Response to previous treatment: increase motion       Pain: 3/10  Location: carpal tunnel pain     Objective    received the following supervised modalities after being cleared for contradictions for 10 minutes:   -  Juan Luis received moist heat  to right  Hand and coban sf down for fisting for 10 minutes to increase blood flow, circulation, pain management and for tissue elasticity prior to therex.     Patient receives Pre-mod for pain control applied to ulnar side fo hand , pulse rate = 80/150 Hz, pulse width = 12.5 , cont current for 50  Minutes. With TA and TE   Juan Luis   received the following manual therapy techniques to increase joint mobilization and soft tissue mobilization for 20 minutes:     - mini-vibrator on scars x 5 minutes, patient holing vib on volar hand     -Performed manual therapy techniques to small finger  area including joint mobilizations, grade for 15 minutes to increase joint mobility, ROM and for pain management.       Juan Luis  participated in dynamic functional therapeutic exercises /TA to improve " functional performance while increasing strength, endurance, ROM,  and flexibility  for 50  minutes, including:     - beans and towel  And macaroni's in bang for desensitization x 5 min each  - peg place and pull with thumb and SF x 35 pegs   -loop pulls with SF and thumb  - fine motor task        Pre session:   MP flexion : full  PIP flexion: 87    Post session: full motion   Strength: (SENDY Dynamometer in lbs.) Average 3 trials, Position II:     1/8/2020 1/8/2020   Rung2 Right  Left   SENDY # 2 67#(+5) 80#       -went over BP glides      .Patient received fluidotherapy to right  hand(s) for 8 minutes to increase blood flow, circulation, desensitization, sensory re-education and for pain management.         Home Exercises and Education Provided     Education provided:  - discussed insurance limitation  - Progress towards goals     Written Home Exercises Provided: Patient instructed to cont prior HEP.  Exercises were reviewed and Juan Luis was able to demonstrate them prior to the end of the session.  Juan Luis demonstrated good  understanding of the HEP provided.   .   See EMR under Patient Instructions for exercises provided 10/31/19 with red sponge for gripping and red putty for highligther pulls.       Assessment     Pt would continue to benefit from skilled OT. Supplied nerve glides, increased motion in digits. ESTIM very helpful throughout session.     Juan Luis is progressing well towards his goals and there are no updates to goals at this time. Pt prognosis is Good.   The patient demonstrated proper understanding  of each exercise.Pt required verbal and tactile cues for HEP and education with use of finger motion and use.  Pt  will continue to benefit from skilled OT intervention.     Anticipated barriers to occupational therapy: cancer    Pt's spiritual, cultural and educational needs considered and pt agreeable to plan of care and goals.    Goals:  Goals to be met in 4  weeks:     1) Patient to be IND with HEP and  modalities for pain managment.  2) Patient will  Increase Active Range of motion 15-20 degrees in hand/wrist to increase functional hand use for ADLs/work/leisure activities.  3)Pt will increase  strength 10-20 lbs. to improve functional grasp for ADLs/work/leisure activities.         Plan      Plan     Certification Period/Plan of care expiration: 12/30/2020 to 2/15/2020.     Outpatient Occupational Therapy 1 times weekly for 4 weeks to include the following interventions: Paraffin, Fluidotherapy, Modalities for pain management, US 3 mhz, Therapeutic exercises/activities., Strengthening, Edema Control, Scar Management and Wound Care.        SHIELA Dickerson,  OTR/L, CHT  Occupational therapist, Certified Hand Therapist         SHIELA Dickerson, OTR/L, CHT

## 2020-01-08 NOTE — PLAN OF CARE
"                            Occupational Therapy Daily Treatment Note       Date: 1/8/2020  Name: Juan Luis Mcgrath  Clinic Number: [id number]    Therapy Diagnosis:   Encounter Diagnosis   Name Primary?    Stiffness due to immobility Yes     Physician:Luna Tirado MD and Miladis Mccain PA     Physician Orders: Evaluate and treat ; 2x/wk x 6 wks , Modalities prn  Medical Diagnosis: C44.622 (ICD-10-CM) - Squamous cell skin cancer, finger, right  Evaluation Date: 10/15/2019  Insurance Authorization Expiration date : 12-31-19  Plan of Care Certification Period: 10-15-19 to 11-15-19  Date of Return to MD: 11-5-19     Visit # / Visits authorized:1 / 20  Time In:11 am   Time Out: 12:20 pm   Total Billable Time: 80 minutes     Precautions:  Standard and cancer      Subjective     Pt reports: I think it is getting better."    he was not compliant with home exercise program given last session.   Response to previous treatment: increase motion       Pain: 3/10  Location: carpal tunnel pain     Objective    received the following supervised modalities after being cleared for contradictions for 10 minutes:   -  Juan Luis received moist heat  to right  Hand and coban sf down for fisting for 10 minutes to increase blood flow, circulation, pain management and for tissue elasticity prior to therex.     Patient receives Pre-mod for pain control applied to ulnar side fo hand , pulse rate = 80/150 Hz, pulse width = 12.5 , cont current for 50  Minutes. With TA and TE   Juan Luis   received the following manual therapy techniques to increase joint mobilization and soft tissue mobilization for 20 minutes:     - mini-vibrator on scars x 5 minutes, patient holing vib on volar hand     -Performed manual therapy techniques to small finger  area including joint mobilizations, grade for 15 minutes to increase joint mobility, ROM and for pain management.       Juan Luis  participated in dynamic functional therapeutic exercises /TA to improve " functional performance while increasing strength, endurance, ROM,  and flexibility  for 50  minutes, including:     - beans and towel  And macaroni's in bang for desensitization x 5 min each  - peg place and pull with thumb and SF x 35 pegs   -loop pulls with SF and thumb  - fine motor task        Pre session:   MP flexion : full  PIP flexion: 87    Post session: full motion   Strength: (SENDY Dynamometer in lbs.) Average 3 trials, Position II:     1/8/2020 1/8/2020   Rung2 Right  Left   SENDY # 2 67#(+5) 80#       -went over BP glides      .Patient received fluidotherapy to right  hand(s) for 8 minutes to increase blood flow, circulation, desensitization, sensory re-education and for pain management.         Home Exercises and Education Provided     Education provided:  - discussed insurance limitation  - Progress towards goals     Written Home Exercises Provided: Patient instructed to cont prior HEP.  Exercises were reviewed and Juan Luis was able to demonstrate them prior to the end of the session.  Juan Luis demonstrated good  understanding of the HEP provided.   .   See EMR under Patient Instructions for exercises provided 10/31/19 with red sponge for gripping and red putty for highligther pulls.       Assessment     Pt would continue to benefit from skilled OT. Supplied nerve glides, increased motion in digits. ESTIM very helpful throughout session.     Juan Luis is progressing well towards his goals and there are no updates to goals at this time. Pt prognosis is Good.   The patient demonstrated proper understanding  of each exercise.Pt required verbal and tactile cues for HEP and education with use of finger motion and use.  Pt  will continue to benefit from skilled OT intervention.     Anticipated barriers to occupational therapy: cancer    Pt's spiritual, cultural and educational needs considered and pt agreeable to plan of care and goals.    Goals:  Goals to be met in 4  weeks:     1) Patient to be IND with HEP and  modalities for pain managment.  2) Patient will  Increase Active Range of motion 15-20 degrees in hand/wrist to increase functional hand use for ADLs/work/leisure activities.  3)Pt will increase  strength 10-20 lbs. to improve functional grasp for ADLs/work/leisure activities.         Plan      Plan     Certification Period/Plan of care expiration: 12/30/2020 to 2/15/2020.     Outpatient Occupational Therapy 1 times weekly for 4 weeks to include the following interventions: Paraffin, Fluidotherapy, Modalities for pain management, US 3 mhz, Therapeutic exercises/activities., Strengthening, Edema Control, Scar Management and Wound Care.        SHIELA Dickerson,  OTR/L, CHT  Occupational therapist, Certified Hand Therapist         SHIELA Dickerson, OTR/L, CHT

## 2020-01-13 ENCOUNTER — PROCEDURE VISIT (OUTPATIENT)
Dept: PHYSICAL MEDICINE AND REHAB | Facility: CLINIC | Age: 65
End: 2020-01-13
Payer: COMMERCIAL

## 2020-01-13 DIAGNOSIS — R20.0 HAND NUMBNESS: ICD-10-CM

## 2020-01-13 PROCEDURE — 95886 PR EMG COMPLETE, W/ NERVE CONDUCTION STUDIES, 5+ MUSCLES: ICD-10-PCS | Mod: S$GLB,,, | Performed by: PHYSICAL MEDICINE & REHABILITATION

## 2020-01-13 PROCEDURE — 95909 PR NERVE CONDUCTION STUDY; 5-6 STUDIES: ICD-10-PCS | Mod: S$GLB,,, | Performed by: PHYSICAL MEDICINE & REHABILITATION

## 2020-01-13 PROCEDURE — 95886 MUSC TEST DONE W/N TEST COMP: CPT | Mod: S$GLB,,, | Performed by: PHYSICAL MEDICINE & REHABILITATION

## 2020-01-13 PROCEDURE — 95909 NRV CNDJ TST 5-6 STUDIES: CPT | Mod: S$GLB,,, | Performed by: PHYSICAL MEDICINE & REHABILITATION

## 2020-01-13 NOTE — PROCEDURES
Test Date:  2020    Patient: Juan Luis Mcgrath : 1955 Physician: Vinicio Goel D.O.   ID#:  SEX: Male Ref. 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  He has had numbness/tingling/parasthesias of the right 2nd-4th digits for a few months.  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  He has no significant symptoms on the left hand.         NCV & EMG Findings:   Evaluation of the right median sensory nerve showed prolonged distal peak latency and decreased conduction velocity.   All remaining nerves (as indicated in the following tables) were within normal limits.   All examined muscles (as indicated in the following table) showed no evidence of electrical instability.    Impression:  1. There is evidence of a mild sensory median mononeuropathy across the right wrist (I.e. Carpal tunnel syndrome).  There is no motor nerve involvement and no active denervation.    2. No evidence to suggest a brachial plexopathy affecting the right upper extremity   ___________________________  Vinicio Goel D.O.        NCS+  Motor Nerve Results      Latency Amplitude F-Lat Segment Distance CV Comment   Site (ms) Norm (mV) Norm (ms)  (cm) (m/s) Norm    Right Median (APB)   Wrist 4.7  < 4.7 5.2  > 3.8  Wrist-Palm 9 - -    Elbow 8.6 - 5.4 -  Elbow-Wrist 23 59  > 47    Right Ulnar (ADM)   Wrist 2.8  < 3.7 7.5  > 3.0         Bel Elbow 6.0 - 8.2 -  Bel Elbow-Wrist 25 78  > 52    Abv Elbow 7.8 - 7.3 -  Abv Elbow-Bel Elbow 14 78  > 43      Sensory Nerve Results      Latency (Peak) Amplitude (P-P) Segment Distance CV Comment   Site (ms) Norm (µV) Norm  (cm) (m/s) Norm    Right Median   Wrist-Dig II *4.7  < 4.0 13  > 8 Wrist-Dig II 14 *30  > 39    Right Ulnar   Wrist-Dig V 3.4  < 4.0 26  > 4  Wrist-Dig V 14 41  > 38    Right Radial   Forearm-Wrist 1.68  < 2.8 19  > 11 Forearm-Wrist 10 60 -      EMG+     Side Muscle Nerve Root Ins Act Fibs Psw Amp Dur Poly Recrt Int Pat Comment   Right Biceps Musculocut C5-C6 Nml Nml Nml Nml Nml 0 Nml Nml    Right Triceps Radial C6-C8 Nml Nml Nml Nml Nml 0 Nml Nml    Right Pronator Teres Median C6-C7 Nml Nml Nml Nml Nml 0 Nml Nml    Right Brachiorad Radial C5-C6 Nml Nml Nml Nml Nml 0 Nml Nml    Right FDI Ulnar C8-T1 Nml Nml Nml Nml Nml 0 Nml Nml    Right APB Median C8-T1 Nml Nml Nml Nml Nml 0 Nml Nml            Waveforms:    Motor       Sensory

## 2020-01-17 ENCOUNTER — PATIENT MESSAGE (OUTPATIENT)
Dept: PHARMACY | Facility: CLINIC | Age: 65
End: 2020-01-17

## 2020-03-03 ENCOUNTER — CLINICAL SUPPORT (OUTPATIENT)
Dept: REHABILITATION | Facility: HOSPITAL | Age: 65
End: 2020-03-03
Payer: COMMERCIAL

## 2020-03-03 DIAGNOSIS — M25.60 STIFFNESS DUE TO IMMOBILITY: Primary | ICD-10-CM

## 2020-03-03 DIAGNOSIS — Z74.09 STIFFNESS DUE TO IMMOBILITY: Primary | ICD-10-CM

## 2020-03-03 PROCEDURE — 97110 THERAPEUTIC EXERCISES: CPT | Mod: PO

## 2020-03-03 PROCEDURE — 97014 ELECTRIC STIMULATION THERAPY: CPT | Mod: PO

## 2020-03-03 NOTE — PROGRESS NOTES
"                            Occupational Therapy Daily Treatment Note       Date: 3/3/2020  Name: Juan Luis Mcgrath  Clinic Number: [id number]    Therapy Diagnosis:   Encounter Diagnosis   Name Primary?    Stiffness due to immobility Yes     Physician:Luna Tirado MD and Miladis Mccain PA     Physician Orders: Evaluate and treat ; 2x/wk x 6 wks , Modalities prn  Medical Diagnosis: C44.622 (ICD-10-CM) - Squamous cell skin cancer, finger, right  Evaluation Date: 10/15/2019  Insurance Authorization Expiration date : 12-31-19  Plan of Care Certification Period: 10-15-19 to 11-15-19  Date of Return to MD: 11-5-19     Visit # / Visits authorized:1 / 20  Time In:11 am   Time Out: 12:20 pm   Total Billable Time: 80 minutes     Precautions:  Standard and cancer      Subjective     Pt reports: I think it is getting better."    he was not compliant with home exercise program given last session.   Response to previous treatment: increase motion       Pain: 3/10  Location: carpal tunnel pain     Objective    received the following supervised modalities after being cleared for contradictions for 10 minutes:   -  Juan Luis received moist heat  to right  Hand and coban sf down for fisting for 10 minutes to increase blood flow, circulation, pain management and for tissue elasticity prior to therex.     Patient receives Pre-mod for pain control applied to ulnar side fo hand , pulse rate = 80/150 Hz, pulse width = 12.5 , cont current for 50  Minutes. With TA and TE   Juan Luis   received the following manual therapy techniques to increase joint mobilization and soft tissue mobilization for 20 minutes:     - mini-vibrator on scars x 5 minutes, patient holing vib on volar hand     -Performed manual therapy techniques to small finger  area including joint mobilizations, grade for 15 minutes to increase joint mobility, ROM and for pain management.       Juan Luis  participated in dynamic functional therapeutic exercises /TA to improve " functional performance while increasing strength, endurance, ROM,  and flexibility  for 50  minutes, including:     - beans and towel  And macaroni's in bang for desensitization x 5 min each  - peg place and pull with thumb and SF x 35 pegs   -loop pulls with SF and thumb  - fine motor task        Pre session:   MP flexion : full  PIP flexion:  FULL    Post session: full motion   Strength: (SENDY Dynamometer in lbs.) Average 3 trials, Position II:     3/3/2020 3/3/2020   Rung2 Right  Left   SENDY # 2 73(+6) 80#       -went over BP glides      .Patient received fluidotherapy to right  hand(s) for 8 minutes to increase blood flow, circulation, desensitization, sensory re-education and for pain management.         Home Exercises and Education Provided     Education provided:  - discussed insurance limitation  - Progress towards goals     Written Home Exercises Provided: Patient instructed to cont prior HEP.  Exercises were reviewed and Juan Luis was able to demonstrate them prior to the end of the session.  Juan Luis demonstrated good  understanding of the HEP provided.   .   See EMR under Patient Instructions for exercises provided 10/31/19 with red sponge for gripping and red putty for highligther pulls.       Assessment     Pt would continue to benefit from skilled OT. Applied ESTIM very helpful throughout session.     Juan Luis is progressing well towards his goals and there are no updates to goals at this time. Pt prognosis is Good.   The patient demonstrated proper understanding  of each exercise.Pt required verbal and tactile cues for HEP and education with use of finger motion and use.  Pt  will continue to benefit from skilled OT intervention.     Anticipated barriers to occupational therapy: cancer    Pt's spiritual, cultural and educational needs considered and pt agreeable to plan of care and goals.    Goals:  Goals to be met in 4  weeks:     1) Patient to be IND with HEP and modalities for pain managment.  2) Patient  will  Increase Active Range of motion 15-20 degrees in hand/wrist to increase functional hand use for ADLs/work/leisure activities.  3)Pt will increase  strength 10-20 lbs. to improve functional grasp for ADLs/work/leisure activities.         Plan      Plan     Certification Period/Plan of care expiration: 12/30/2020 to 2/15/2020.     Outpatient Occupational Therapy 1 times weekly for 4 weeks to include the following interventions: Paraffin, Fluidotherapy, Modalities for pain management, US 3 mhz, Therapeutic exercises/activities., Strengthening, Edema Control, Scar Management and Wound Care.        SHIELA Dickerson,  OTR/L, CHT  Occupational therapist, Certified Hand Therapist         SHIELA Dickerson, OTR/L, CHT

## 2020-04-21 DIAGNOSIS — Z01.84 ANTIBODY RESPONSE EXAMINATION: ICD-10-CM

## 2020-04-22 ENCOUNTER — LAB VISIT (OUTPATIENT)
Dept: LAB | Facility: HOSPITAL | Age: 65
End: 2020-04-22
Attending: INTERNAL MEDICINE
Payer: COMMERCIAL

## 2020-04-22 DIAGNOSIS — Z01.84 ANTIBODY RESPONSE EXAMINATION: ICD-10-CM

## 2020-04-22 LAB — SARS-COV-2 IGG SERPL QL IA: NEGATIVE

## 2020-04-22 PROCEDURE — 86769 SARS-COV-2 COVID-19 ANTIBODY: CPT

## 2020-04-22 PROCEDURE — 36415 COLL VENOUS BLD VENIPUNCTURE: CPT

## 2020-05-16 RX ORDER — MELOXICAM 15 MG/1
15 TABLET ORAL
Qty: 30 TABLET | Refills: 2 | Status: SHIPPED | OUTPATIENT
Start: 2020-05-16 | End: 2020-08-14

## 2020-05-18 ENCOUNTER — PATIENT OUTREACH (OUTPATIENT)
Dept: ADMINISTRATIVE | Facility: OTHER | Age: 65
End: 2020-05-18

## 2020-05-18 ENCOUNTER — OFFICE VISIT (OUTPATIENT)
Dept: PAIN MEDICINE | Facility: CLINIC | Age: 65
End: 2020-05-18
Attending: ANESTHESIOLOGY
Payer: COMMERCIAL

## 2020-05-18 ENCOUNTER — TELEPHONE (OUTPATIENT)
Dept: PAIN MEDICINE | Facility: CLINIC | Age: 65
End: 2020-05-18

## 2020-05-18 VITALS
DIASTOLIC BLOOD PRESSURE: 75 MMHG | HEIGHT: 70 IN | SYSTOLIC BLOOD PRESSURE: 125 MMHG | BODY MASS INDEX: 28.77 KG/M2 | HEART RATE: 66 BPM | TEMPERATURE: 98 F | WEIGHT: 201 LBS

## 2020-05-18 DIAGNOSIS — M46.1 SACROILIITIS: ICD-10-CM

## 2020-05-18 DIAGNOSIS — M79.18 MYOFASCIAL PAIN: Primary | ICD-10-CM

## 2020-05-18 DIAGNOSIS — G57.01 PIRIFORMIS SYNDROME OF RIGHT SIDE: ICD-10-CM

## 2020-05-18 PROCEDURE — 3008F PR BODY MASS INDEX (BMI) DOCUMENTED: ICD-10-PCS | Mod: CPTII,S$GLB,, | Performed by: ANESTHESIOLOGY

## 2020-05-18 PROCEDURE — 99204 PR OFFICE/OUTPT VISIT, NEW, LEVL IV, 45-59 MIN: ICD-10-PCS | Mod: 25,S$GLB,, | Performed by: ANESTHESIOLOGY

## 2020-05-18 PROCEDURE — 3008F BODY MASS INDEX DOCD: CPT | Mod: CPTII,S$GLB,, | Performed by: ANESTHESIOLOGY

## 2020-05-18 PROCEDURE — 76942 PR U/S GUIDANCE FOR NEEDLE GUIDANCE: ICD-10-PCS | Mod: S$GLB,,, | Performed by: ANESTHESIOLOGY

## 2020-05-18 PROCEDURE — 99204 OFFICE O/P NEW MOD 45 MIN: CPT | Mod: 25,S$GLB,, | Performed by: ANESTHESIOLOGY

## 2020-05-18 PROCEDURE — 20552 PR INJECT TRIGGER POINT, 1 OR 2: ICD-10-PCS | Mod: S$GLB,,, | Performed by: ANESTHESIOLOGY

## 2020-05-18 PROCEDURE — 99999 PR PBB SHADOW E&M-EST. PATIENT-LVL III: ICD-10-PCS | Mod: PBBFAC,,, | Performed by: ANESTHESIOLOGY

## 2020-05-18 PROCEDURE — 20552 NJX 1/MLT TRIGGER POINT 1/2: CPT | Mod: S$GLB,,, | Performed by: ANESTHESIOLOGY

## 2020-05-18 PROCEDURE — 76942 ECHO GUIDE FOR BIOPSY: CPT | Mod: S$GLB,,, | Performed by: ANESTHESIOLOGY

## 2020-05-18 PROCEDURE — 99999 PR PBB SHADOW E&M-EST. PATIENT-LVL III: CPT | Mod: PBBFAC,,, | Performed by: ANESTHESIOLOGY

## 2020-05-18 RX ORDER — HYDROXYCHLOROQUINE SULFATE 200 MG/1
TABLET, FILM COATED ORAL
COMMUNITY
Start: 2020-03-20 | End: 2020-07-01

## 2020-05-18 NOTE — PROGRESS NOTES
Chronic Pain - New Consult    Referring Physician: Tyson, Ricky    Chief Complaint:   Chief Complaint   Patient presents with    Low-back Pain        SUBJECTIVE:    Juan Luis Milligan Alcides presents to the clinic for the evaluation of low back pain. The pain started 2 months ago following twisting in chair and symptoms have been worsening.The pain is located in the low back  area and radiates to the right lower extemity.  The pain is described as aching and is rated as 5/10. The pain is rated with a score of  5/10 on the BEST day and a score of 5/10 on the WORST day.  Symptoms interfere with daily activity. The pain is exacerbated by Walking and running.  The pain is mitigated by stretching. He reports spending 0 hours per day reclining. The patient reports 6 hours of uninterrupted sleep per night.  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    Physical Therapy/Home Exercise: no      Pain Disability Index Review:  Last 3 PDI Scores 5/18/2020   Pain Disability Index (PDI) 5       Pain Medications:    - Adjuvant Medications: Mobic (Meloxicam)     report:  Reviewed and consistent with medication use as prescribed.    Pain Procedures: N/A    Imaging: N/A    Past Medical History:   Diagnosis Date    Prostate cancer 2013    Squamous cell carcinoma 9/23/2019     Past Surgical History:   Procedure Laterality Date    ANTERIOR CRUCIATE LIGAMENT REPAIR      APPENDECTOMY      FINGER AMPUTATION Right 9/23/2019    Procedure: AMPUTATION, FINGER;  Surgeon:  Luna Tirado MD;  Location: [facility];  Service: Orthopedics;  Laterality: Right;  regional MAC    FINGER MASS EXCISION Right 9/9/2019    Procedure: EXCISION, MASS, FINGER small finger resection;  Surgeon: Luna Tirado MD;  Location: [facility];  Service: Orthopedics;  Laterality: Right;  regional MAC    PROSTATECTOMY      radical    REMOVAL OF NAIL OF DIGIT Right 8/19/2019    Procedure: REMOVAL, NAIL, DIGIT right small finger;  Surgeon: Luna Tirado MD;  Location: [facility];  Service: Orthopedics;  Laterality: Right;     Social History     Socioeconomic History    Marital status:      Spouse name: Not on file    Number of children: Not on file    Years of education: Not on file    Highest education level: Not on file   Occupational History    Occupation: anesthesiologist   Social Needs    Financial resource strain: Not hard at all    Food insecurity:     Worry: Never true     Inability: Never true    Transportation needs:     Medical: No     Non-medical: Not on file   Tobacco Use    Smoking status: Never Smoker    Smokeless tobacco: Never Used   Substance and Sexual Activity    Alcohol use: Yes     Alcohol/week: 3.0 standard drinks     Types: 3 Glasses of wine per week     Frequency: 2-4 times a month     Drinks per session: 1 or 2     Binge frequency: Never     Comment: social    Drug use: No    Sexual activity: Yes     Partners: Female   Lifestyle    Physical activity:     Days per week: 1 day     Minutes per session: 40 min    Stress: Not at all   Relationships    Social connections:     Talks on phone: Three times a week     Gets together: Once a week     Attends [employer] service: Not on file     Active member of club or organization: No     Attends meetings of clubs or organizations: Patient refused     Relationship status:    Other Topics Concern    Not on file   Social History Narrative    Moved from [place] 3 weeks ago. Lives with wife.     Family History  "  Problem Relation Age of Onset    No Known Problems Mother     Prostate cancer Father     Prostate cancer Maternal Uncle     Melanoma Neg Hx        Review of patient's allergies indicates:  No Known Allergies    Current Outpatient Medications   Medication Sig    hydroxychloroquine (PLAQUENIL) 200 mg tablet     meloxicam (MOBIC) 15 MG tablet Take 1 tablet (15 mg total) by mouth daily with breakfast.    valACYclovir (VALTREX) 1000 MG tablet      No current facility-administered medications for this visit.        REVIEW OF SYSTEMS:    GENERAL:  No weight loss, malaise or fevers.  HEENT:  Negative for frequent or significant headaches.  NECK:  Negative for lumps, goiter, pain and significant neck swelling.  RESPIRATORY:  Negative for cough, wheezing or shortness of breath.  CARDIOVASCULAR:  Negative for chest pain, leg swelling or palpitations.  GI:  Negative for abdominal discomfort, blood in stools or black stools or change in bowel habits.  MUSCULOSKELETAL:  See HPI.  SKIN:  Negative for lesions, rash, and itching.  PSYCH:  Positive for sleep disturbance, mood disorder and recent psychosocial stressors.  HEMATOLOGY/LYMPHOLOGY:  Negative for prolonged bleeding, bruising easily or swollen nodes.  NEURO:   No history of headaches, syncope, paralysis, seizures or tremors.  All other reviewed and negative other than HPI.    OBJECTIVE:    /75   Pulse 66   Temp 97.7 °F (36.5 °C) (Oral)   Ht 5' 10" (1.778 m)   Wt 91.2 kg (201 lb)   BMI 28.84 kg/m²     PHYSICAL EXAMINATION:    General appearance: Well appearing, in no acute distress, alert and oriented x3.  Psych:  Mood and affect appropriate.  Skin: Skin color, texture, turgor normal, no rashes or lesions, in both upper and lower body.  Head/face:  Normocephalic, atraumatic. No palpable lymph nodes.  Neck: No pain to palpation over the cervical paraspinous muscles. Spurling Negative. No pain with neck flexion, extension, or lateral flexion.   Cor: " RRR  Pulm: CTA  GI:  Soft and non-tender.  Back: Straight leg raising in the sitting and supine positions is negative to radicular pain.  By tomorrow pain to palpation over the spine or costovertebral angles. Normal range of motion without pain reproduction. Positive axial loading test bilateral more right than left. Positive tenderness over right SIJ, Positive FABERE,Ganselin and Yeoman's test on the right side.negative FADIR  Extremities: Peripheral joint ROM is full and pain free without obvious instability or laxity in all four extremities. No deformities, edema, or skin discoloration. Good capillary refill.  Musculoskeletal: Shoulder, hip, sacroiliac and knee provocative maneuvers are negative. Bilateral upper and lower extremity strength is normal and symmetric.  No atrophy or tone abnormalities are noted.  Neuro: Bilateral upper and lower extremity coordination and muscle stretch reflexes are physiologic and symmetric.  Plantar response are downgoing. No loss of sensation is noted.  Gait: normal.    ASSESSMENT: 64 y.o. year old male with low back pain, consistent with      1. Myofascial pain     2. Piriformis syndrome of right side     3. Sacroiliitis           PLAN:     - I have stressed the importance of physical activity and a home exercise plan to help with pain and improve health.  - recommended Physical therapy for Lumbar stabilization, core strengthening, and a home exercise program.  - Patient can continue with medications for now since they are providing benefits, using them appropriately, and without side effects.  - Schedule for a right pyriformis muscle injection under ultrasound to help with his pain and progress with a home exercise plan.  - RTC 4-8 weeks  - Counseled patient regarding the importance of activity modification, constant sleeping habits and physical therapy.    The above plan and management options were discussed at length with patient. Patient is in agreement with the above and  verbalized understanding. It will be communicated with the referring physician via electronic record, fax, or mail.        Patient Name: Juan Luis Mcgrath  MRN: [id number]    INFORMED CONSENT: The procedure, risks, benefits and options were discussed with patient. There are no contraindications to the procedure. The patient expressed understanding and agreed to proceed. The personnel performing the procedure was discussed. I verify that I personally obtained Juan Luis's consent prior to the start of the procedure and the signed consent can be found on the patient's chart.    Anesthesia: Topical    Pre Procedure diagnosis: * No surgery found *  1. Myofascial pain    2. Piriformis syndrome of right side    3. Sacroiliitis      Post-Procedure diagnosis: SAME      Sedation: None    PROCEDURE:  Right PIRIFORMIS MUSCLE INJECTION UNDER U/S GUIDE        DESCRIPTION OF PROCEDURE: The patient was brought to the procedure room.  The patient was positioned prone on the procedure table. . The skin was prepped with chlorhexidine three times, and draped in a sterile fashion. The gluteus caesar and piriformis muscles were visualized and demarcated by the sheath and appears as a hyperechoic band. The 22-gauge 4 inch stimplex needle was advanced until ultrasound visualization showed it traversing the gluteus caesar and piercing  the piriformis muscle.  After negative aspiration for blood,  5-8 ml of Bupivacaine 0.25% and 40 mg depomedrol was slowly administered. The needle was removed and bleeding was nil.  A sterile dressing was applied.     Blood Loss: Nill 6  Specimen: None    Roosevelt Reddy MD      05/18/2020

## 2020-05-18 NOTE — TELEPHONE ENCOUNTER
Contacted patient to schedule a follow up. There was no answer, left detailed message, requesting a return call.

## 2020-05-18 NOTE — TELEPHONE ENCOUNTER
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

## 2020-05-22 NOTE — PATIENT INSTRUCTIONS
Exercise guide:    https://order.margarito.nih.gov/sites/default/files/2018-04/margarito-exercise-guide.pdf    https://wo5tmkt.margarito.nih.gov/workout-videos/    To rate your experience with Dr Reddy please click on the link below    [email or web address]

## 2020-06-15 ENCOUNTER — TELEPHONE (OUTPATIENT)
Dept: PAIN MEDICINE | Facility: CLINIC | Age: 65
End: 2020-06-15

## 2020-06-15 DIAGNOSIS — G57.01 PIRIFORMIS SYNDROME OF RIGHT SIDE: ICD-10-CM

## 2020-06-15 DIAGNOSIS — M46.1 SACROILIITIS: Primary | ICD-10-CM

## 2020-06-15 NOTE — TELEPHONE ENCOUNTER
Spoke to patient to confirm procedure appointment. Date, time, and instructions given. Patient verbalized understanding.

## 2020-07-01 ENCOUNTER — HOSPITAL ENCOUNTER (EMERGENCY)
Facility: HOSPITAL | Age: 65
Discharge: HOME | End: 2020-07-01
Attending: EMERGENCY MEDICINE
Payer: COMMERCIAL

## 2020-07-01 VITALS
RESPIRATION RATE: 16 BRPM | BODY MASS INDEX: 28.63 KG/M2 | SYSTOLIC BLOOD PRESSURE: 136 MMHG | TEMPERATURE: 98 F | OXYGEN SATURATION: 97 % | DIASTOLIC BLOOD PRESSURE: 84 MMHG | WEIGHT: 200 LBS | HEIGHT: 70 IN | HEART RATE: 86 BPM

## 2020-07-01 DIAGNOSIS — Z20.822 SUSPECTED COVID-19 VIRUS INFECTION: Primary | ICD-10-CM

## 2020-07-01 DIAGNOSIS — U07.1 COVID-19 VIRUS DETECTED: ICD-10-CM

## 2020-07-01 DIAGNOSIS — R05.9 COUGH: Primary | ICD-10-CM

## 2020-07-01 LAB — SARS-COV-2 RDRP RESP QL NAA+PROBE: POSITIVE

## 2020-07-01 PROCEDURE — 99284 PR EMERGENCY DEPT VISIT,LEVEL IV: ICD-10-PCS | Mod: ,,, | Performed by: EMERGENCY MEDICINE

## 2020-07-01 PROCEDURE — U0002 COVID-19 LAB TEST NON-CDC: HCPCS

## 2020-07-01 PROCEDURE — 99284 EMERGENCY DEPT VISIT MOD MDM: CPT

## 2020-07-01 PROCEDURE — 99284 EMERGENCY DEPT VISIT MOD MDM: CPT | Mod: ,,, | Performed by: EMERGENCY MEDICINE

## 2020-07-01 RX ORDER — PREDNISONE 20 MG/1
40 TABLET ORAL DAILY
Qty: 10 TABLET | Refills: 0 | Status: SHIPPED | OUTPATIENT
Start: 2020-07-01 | End: 2020-07-06

## 2020-07-01 RX ORDER — ALBUTEROL SULFATE 90 UG/1
1-2 AEROSOL, METERED RESPIRATORY (INHALATION) EVERY 6 HOURS PRN
Qty: 18 G | Refills: 2 | Status: SHIPPED | OUTPATIENT
Start: 2020-07-01 | End: 2022-10-20

## 2020-07-01 NOTE — ED PROVIDER NOTES
"Encounter Date: 7/1/2020       History     Chief Complaint   Patient presents with    Cough     new onset yesterday, + generalzied body aches, fatigue. NO N/V/fever or chills. States," My wife tested positive today and I want to get tested".     HPI     This is a 64-year-old man with a history of prior prostate cancer and prior squamous carcinoma, no current active cancers who presents with acute onset myalgias, fatigue and cough, and he was recently notified that his wife has tested positive for COVID.  He denies dyspnea, chest pain, lightheadedness, or any other complaints.  He does not have immunosuppression as far as he knows.  Review of patient's allergies indicates:  No Known Allergies  Past Medical History:   Diagnosis Date    Prostate cancer 2013    Squamous cell carcinoma 9/23/2019     Past Surgical History:   Procedure Laterality Date    ANTERIOR CRUCIATE LIGAMENT REPAIR      APPENDECTOMY      FINGER AMPUTATION Right 9/23/2019    Procedure: AMPUTATION, FINGER;  Surgeon: Luna Tirado MD;  Location: [facility];  Service: Orthopedics;  Laterality: Right;  regional MAC    FINGER MASS EXCISION Right 9/9/2019    Procedure: EXCISION, MASS, FINGER small finger resection;  Surgeon: Luna Tirado MD;  Location: [facility];  Service: Orthopedics;  Laterality: Right;  regional MAC    PROSTATECTOMY      radical    REMOVAL OF NAIL OF DIGIT Right 8/19/2019    Procedure: REMOVAL, NAIL, DIGIT right small finger;  Surgeon: Luna Tirado MD;  Location: [facility];  Service: Orthopedics;  Laterality: Right;     Family History   Problem Relation Age of Onset    No Known Problems Mother     Prostate cancer Father     Prostate cancer Maternal Uncle     Melanoma Neg Hx      Social History     Tobacco Use    Smoking status: Never Smoker    Smokeless tobacco: Never Used   Substance Use Topics    Alcohol use: Yes     Alcohol/week: 3.0 standard drinks     Types: 3 Glasses of wine per week     " Frequency: 2-4 times a month     Drinks per session: 1 or 2     Binge frequency: Never     Comment: social    Drug use: No     Review of Systems   Constitutional: Positive for appetite change, chills, fatigue and fever.   HENT: Positive for congestion. Negative for sore throat.    Eyes: Negative for pain and redness.   Respiratory: Positive for cough. Negative for shortness of breath, wheezing and stridor.    Cardiovascular: Negative for chest pain, palpitations and leg swelling.   Gastrointestinal: Negative for diarrhea and nausea.   Genitourinary: Negative for difficulty urinating, dysuria, flank pain, frequency and urgency.   Musculoskeletal: Positive for myalgias.   Skin: Negative for color change and pallor.   Neurological: Negative for tremors, syncope and headaches.       Physical Exam     Initial Vitals [07/01/20 1523]   BP Pulse Resp Temp SpO2   (!) 166/100 86 16 97.9 °F (36.6 °C) 97 %      MAP       --         Physical Exam  Gen: AxOx3, NAD, well nourished  Eye: sclera anicteric, EOMI, no conjunctivitis, no periorbital edema  ENT: NCAT, OP clear, neck supple with FROM, no stridor  CVS: RRR, no m/r/g, distal pulses intact/symmetric  Pulm:  Coarse breath sounds at the bilateral bases, but otherwise clear, no wheezes, rales or rhonchi, no increased work of breathing  Abd: soft, nontender, nondistended, no organomegaly, no CVAT  Ext: no edema, lesions, rashes, or deformity  Neuro: GCS15, moving all extremities, gait intact  Psych: normal affect, cooperative  ED Course   Procedures  Labs Reviewed   SARS-COV-2 RNA AMPLIFICATION, QUAL - Abnormal; Notable for the following components:       Result Value    SARS-CoV-2 RNA, Amplification, Qual Positive (*)     All other components within normal limits          Imaging Results    None          Medical Decision Making:   History:   Old Medical Records: I decided to obtain old medical records.  Initial Assessment:   This is a 64-year-old man who presents with acute  onset myalgias, cough, and fatigue and high risk COVID exposure.  Differential is broad and includes corona virus, influenza like illness, other viral syndrome.  His lungs are overal l clear, I have low suspicion for pneumonia, pulmonary edema, or wheezing.  Plan for COVID swab and discharge home.  Clinical Tests:   Lab Tests: Ordered and Reviewed  ED Management:  Patient's COVID swab resulted as positive.  I communicated the results to the patient, advised him to quarantine per CDC guidelines.  He voiced understanding of this and of return precautions.  He has a pulse oximeter at home and will monitor his oximetry, return if his sat is below 92%.                                 Clinical Impression:       ICD-10-CM ICD-9-CM   1. Suspected Covid-19 Virus Infection  R68.89              ED Disposition Condition    Discharge Stable        ED Prescriptions     Medication Sig Dispense Start Date End Date Auth. Provider    albuterol (PROVENTIL/VENTOLIN HFA) 90 mcg/actuation inhaler Inhale 1-2 puffs into the lungs every 6 (six) hours as needed for Wheezing. Rescue 18 g 7/1/2020 7/1/2021 Kandy Delaney MD    predniSONE (DELTASONE) 20 MG tablet Take 2 tablets (40 mg total) by mouth once daily. for 5 days 10 tablet 7/1/2020 7/6/2020 Kandy Delaney MD        Follow-up Information    None                                    Kandy Delaney MD  07/01/20 5922

## 2020-07-01 NOTE — ED NOTES
Two patient identifiers have been checked and are correct.      Appearance: Pt awake, alert & oriented to person, place & time. Pt in no acute distress at present time. Pt is clean and well groomed with clothes appropriately fastened. + dry non-productive cough.   Skin: Skin warm, dry & intact. Color consistent with ethnicity. Mucous membranes moist. No breakdown or brusing noted.   Musculoskeletal: Patient moving all extremities well, no obvious swelling or deformities noted.   Respiratory: Respirations spontaneous, even, and non-labored. Visible chest rise noted. Airway is open and patent. No accessory muscle use noted.   Neurologic: Sensation is intact. Speech is clear and appropriate. Eyes open spontaneously, behavior appropriate to situation, follows commands, facial expression symmetrical, bilateral hand grasp equal and even, purposeful motor response noted.  Cardiac: All peripheral pulses present. No Bilateral lower extremity edema. Cap refill is <3 seconds.

## 2020-07-01 NOTE — ED NOTES
Discharge information, new medication prescriptions, follow-up care, community resources and home care discussed with patient. Provider has discussed results and plan of care with patient. Respirations even and unlabored w/ no distress , skin warm and dry. Pt educated on current CDC guidelines for home isolation, precautions and proper hand washing, patient reports understanding and denies further questions or concerns at this time. Pt ambulated unassisted with steady to d/c area with all discharge paperwork, prescriptions and CDC guidelines paperwork.

## 2020-07-02 ENCOUNTER — TELEPHONE (OUTPATIENT)
Dept: URGENT CARE | Facility: CLINIC | Age: 65
End: 2020-07-02

## 2020-07-06 ENCOUNTER — TELEPHONE (OUTPATIENT)
Dept: PAIN MEDICINE | Facility: OTHER | Age: 65
End: 2020-07-06

## 2020-07-07 ENCOUNTER — TELEPHONE (OUTPATIENT)
Dept: PAIN MEDICINE | Facility: CLINIC | Age: 65
End: 2020-07-07

## 2020-07-17 ENCOUNTER — TELEPHONE (OUTPATIENT)
Dept: PAIN MEDICINE | Facility: CLINIC | Age: 65
End: 2020-07-17

## 2020-07-17 NOTE — TELEPHONE ENCOUNTER
Attempted to contact patient to reschedule procedure, no answer, left voice message to return call.

## 2020-07-21 ENCOUNTER — TELEPHONE (OUTPATIENT)
Dept: PAIN MEDICINE | Facility: CLINIC | Age: 65
End: 2020-07-21

## 2020-07-21 DIAGNOSIS — G57.01 PIRIFORMIS SYNDROME OF RIGHT SIDE: ICD-10-CM

## 2020-07-21 DIAGNOSIS — M46.1 SACROILIITIS: Primary | ICD-10-CM

## 2020-07-22 ENCOUNTER — HOSPITAL ENCOUNTER (OUTPATIENT)
Facility: OTHER | Age: 65
Discharge: HOME | End: 2020-07-22
Attending: ANESTHESIOLOGY | Admitting: ANESTHESIOLOGY
Payer: COMMERCIAL

## 2020-07-22 VITALS
SYSTOLIC BLOOD PRESSURE: 144 MMHG | RESPIRATION RATE: 16 BRPM | DIASTOLIC BLOOD PRESSURE: 84 MMHG | OXYGEN SATURATION: 100 % | HEART RATE: 57 BPM | TEMPERATURE: 98 F

## 2020-07-22 DIAGNOSIS — G57.01 PIRIFORMIS SYNDROME OF RIGHT SIDE: ICD-10-CM

## 2020-07-22 DIAGNOSIS — G89.29 CHRONIC PAIN: ICD-10-CM

## 2020-07-22 DIAGNOSIS — M46.1 SACROILIITIS: Primary | ICD-10-CM

## 2020-07-22 PROCEDURE — 27096 INJECT SACROILIAC JOINT: CPT | Mod: RT,,, | Performed by: ANESTHESIOLOGY

## 2020-07-22 PROCEDURE — 20552 PR INJECT TRIGGER POINT, 1 OR 2: ICD-10-PCS | Mod: 59,RT,, | Performed by: ANESTHESIOLOGY

## 2020-07-22 PROCEDURE — 25500020 PHARM REV CODE 255: Performed by: ANESTHESIOLOGY

## 2020-07-22 PROCEDURE — 20552 NJX 1/MLT TRIGGER POINT 1/2: CPT | Mod: 59,RT,, | Performed by: ANESTHESIOLOGY

## 2020-07-22 PROCEDURE — 27096 INJECT SACROILIAC JOINT: CPT | Mod: RT | Performed by: ANESTHESIOLOGY

## 2020-07-22 PROCEDURE — 20552 NJX 1/MLT TRIGGER POINT 1/2: CPT | Mod: 59 | Performed by: ANESTHESIOLOGY

## 2020-07-22 PROCEDURE — 27096 PR INJECTION,SACROILIAC JOINT: ICD-10-PCS | Mod: RT,,, | Performed by: ANESTHESIOLOGY

## 2020-07-22 PROCEDURE — 25000003 PHARM REV CODE 250: Performed by: ANESTHESIOLOGY

## 2020-07-22 PROCEDURE — 63600175 PHARM REV CODE 636 W HCPCS: Performed by: ANESTHESIOLOGY

## 2020-07-22 RX ORDER — SODIUM CHLORIDE 9 MG/ML
500 INJECTION, SOLUTION INTRAVENOUS CONTINUOUS
Status: DISCONTINUED | OUTPATIENT
Start: 2020-07-22 | End: 2020-07-22 | Stop reason: HOSPADM

## 2020-07-22 RX ORDER — TRIAMCINOLONE ACETONIDE 40 MG/ML
INJECTION, SUSPENSION INTRA-ARTICULAR; INTRAMUSCULAR
Status: DISCONTINUED | OUTPATIENT
Start: 2020-07-22 | End: 2020-07-22 | Stop reason: HOSPADM

## 2020-07-22 RX ORDER — LIDOCAINE HYDROCHLORIDE 10 MG/ML
INJECTION INFILTRATION; PERINEURAL
Status: DISCONTINUED | OUTPATIENT
Start: 2020-07-22 | End: 2020-07-22 | Stop reason: HOSPADM

## 2020-07-22 RX ORDER — BUPIVACAINE HYDROCHLORIDE 2.5 MG/ML
INJECTION, SOLUTION EPIDURAL; INFILTRATION; INTRACAUDAL
Status: DISCONTINUED | OUTPATIENT
Start: 2020-07-22 | End: 2020-07-22 | Stop reason: HOSPADM

## 2020-07-22 NOTE — OP NOTE
Patient Name: Juan Luis Mcgrath  MRN: [id number]    INFORMED CONSENT: The procedure, risks, benefits and options were discussed with patient. There are no contraindications to the procedure. The patient expressed understanding and agreed to proceed. The personnel performing the procedure was discussed. I verify that I personally obtained Juan Luis's consent prior to the start of the procedure and the signed consent can be found on the patient's chart.    Procedure Date: 07/22/2020    Anesthesia: Topical    Pre Procedure diagnosis: Sacroiliitis [M46.1]  Piriformis syndrome of right side [G57.01]  1. Sacroiliitis    2. Piriformis syndrome of right side    3. Chronic pain    4       Myofascial pain  Post-Procedure diagnosis: SAME      Sedation: None    PROCEDURE: right SACROILIAC JOINT INJECTION  DESCRIPTION OF PROCEDURE: The patient was brought to the fluoroscopy suite. After performing time out  IV access was obtained prior to the procedure. The patient was positioned prone on the fluoroscopy table. Continuous hemodynamic monitoring was initiated including blood pressure, EKG, and pulse oximetry.  The lumbosacral area was prepped with chlorhexidine three times and draped into a sterile field. The skin overlying the rt side sacroiliac joint was anesthetized using 3cc of lidocaine 1%. The inferior pole of the sacroiliac joint was identified by cephalo-oblique fluoroscopy. A 22 gauge, 3 1/2 inch spinal needle was slowly advanced through the sacroiliac joint capsule under fluoroscopic guidance. The needle position was confirmed using oblique, AP and lateral fluoroscopic imaging.  Negative aspiration was confirmed. 0.5 cc of Omnipaque 300 was injected confirming intra-articular contrast spread. A combination of 2 cc of Bupivacaine 0.25% and 40 mg kenalog was easily injected. The needle was removed and bleeding was nil.  A sterile dressing was applied.     PROCEDURE: right PIRIFORMIS MUSCLE INJECTION        DESCRIPTION OF  "PROCEDURE:  The skin was prepped with chlorhexidine three times, and draped in a sterile fashion. With a  25 gauge needle 1.5" needle,  2 cc lido 1% was injected subcutaneously over the entry site. The right hip joint was visualized in an AP view. The tip of a 22-gauge 3 1/2 inch  spinal needle was advanced just above the RT hip joint, midway between the greater trochanter and sacrum, and stopped before contacting the hip joint. After negative aspiration for blood, 1 ml of Omnipaque 300 contrast agent was slowly injected. Confirmation of spread of contrast agent within the piriformis muscle was made in the AP fluoroscopic view. Subsequently,  5 ml of Bupivacaine 0.25% and 20 mg kenalog was slowly administered. The needle was removed and bleeding was nil.  A sterile dressing was applied. Juan Luis was taken back to the recovery room for further observation.     Blood Loss: Nill  Specimen: None          Roosevelt Reddy MD      "

## 2020-07-22 NOTE — DISCHARGE SUMMARY
Discharge Note  Short Stay      SUMMARY     Admit Date: 7/22/2020    Attending Physician: Roosevelt Reddy MD    Discharge Physician: George Young      Discharge Date: 7/22/2020 9:25 AM    Procedure(s) (LRB):  INJECTION RIGHT SI JOINT AND RIGHT PIRIFORMIS INJECTION (Right)    Final Diagnosis: Sacroiliitis [M46.1]  Piriformis syndrome of right side [G57.01]    Disposition: Home or self care    Patient Instructions:   Current Discharge Medication List      CONTINUE these medications which have NOT CHANGED    Details   albuterol (PROVENTIL/VENTOLIN HFA) 90 mcg/actuation inhaler Inhale 1-2 puffs into the lungs every 6 (six) hours as needed for Wheezing. Rescue  Qty: 18 g, Refills: 2      meloxicam (MOBIC) 15 MG tablet Take 1 tablet (15 mg total) by mouth daily with breakfast.  Qty: 30 tablet, Refills: 2      valACYclovir (VALTREX) 1000 MG tablet                  Discharge Diagnosis: Sacroiliitis [M46.1]  Piriformis syndrome of right side [G57.01]  Condition on Discharge: Stable with no complications to procedure   Diet on Discharge: Same as before.  Activity: as per instruction sheet.  Discharge to: Home with a responsible adult.  Follow up: 2-4 weeks       Please call my office or pager at [phone] if experienced any weakness or loss of sensation, fever > 101.5, pain uncontrolled with oral medications, persistent nausea/vomiting/or diarrhea, redness or drainage from the incisions, or any other worrisome concerns. If physician on call was not reached or could not communicate with our office for any reason please go to the nearest emergency department

## 2020-07-22 NOTE — PLAN OF CARE
Thank you for allowing us to care for you today. You may receive a survey about the care we provided. Your feedback is valuable and helps us provide excellent care throughout the community. PATIENT TOLERATED PROCEDURE WELL. PT COMPLAINS OF 0 /10 PAIN. ASSISTED PATIENT UP FOR FIRST TIME. STEADY ON FEET AND DISCHARGE INSTRUCTIONS GIVEN.

## 2020-07-22 NOTE — H&P
HPI  Patient presenting for Procedure(s) (LRB):  INJECTION RIGHT SI JOINT AND RIGHT PIRIFORMIS INJECTION (Right)     Patient on Anti-coagulation No    No health changes since previous encounter    Past Medical History:   Diagnosis Date    Prostate cancer 2013    Squamous cell carcinoma 9/23/2019     Past Surgical History:   Procedure Laterality Date    ANTERIOR CRUCIATE LIGAMENT REPAIR      APPENDECTOMY      FINGER AMPUTATION Right 9/23/2019    Procedure: AMPUTATION, FINGER;  Surgeon: Luna Tirado MD;  Location: [facility];  Service: Orthopedics;  Laterality: Right;  regional MAC    FINGER MASS EXCISION Right 9/9/2019    Procedure: EXCISION, MASS, FINGER small finger resection;  Surgeon: Luna Tirado MD;  Location: [facility] OR;  Service: Orthopedics;  Laterality: Right;  regional MAC    PROSTATECTOMY      radical    REMOVAL OF NAIL OF DIGIT Right 8/19/2019    Procedure: REMOVAL, NAIL, DIGIT right small finger;  Surgeon: Luna Tirado MD;  Location: [facility];  Service: Orthopedics;  Laterality: Right;     Review of patient's allergies indicates:  No Known Allergies   No current facility-administered medications for this encounter.        PMHx, PSHx, Allergies, Medications reviewed in epic    ROS negative except pain complaints in HPI    OBJECTIVE:    There were no vitals taken for this visit.    PHYSICAL EXAMINATION:    GENERAL: Well appearing, in no acute distress, alert and oriented x3.  PSYCH:  Mood and affect appropriate.  SKIN: Skin color, texture, turgor normal, no rashes or lesions which will impact the procedure.  CV: RRR with palpation of the radial artery.  PULM: No evidence of respiratory difficulty, symmetric chest rise. Clear to auscultation.  NEURO: Cranial nerves grossly intact.    Plan:    Proceed with procedure as planned Procedure(s) (LRB):  INJECTION RIGHT SI JOINT AND RIGHT PIRIFORMIS INJECTION (Right)    Kwaku Yo  07/22/2020

## 2020-07-22 NOTE — DISCHARGE INSTRUCTIONS

## 2020-07-28 ENCOUNTER — TELEPHONE (OUTPATIENT)
Dept: PAIN MEDICINE | Facility: CLINIC | Age: 65
End: 2020-07-28

## 2020-07-28 NOTE — TELEPHONE ENCOUNTER
Reached out to pt to reschedule appt 8/6/20 @ 3:15 as Dr. Willis will be in a meeting       lvm informing pt has been cancelled as of today, asked pt to contact the office to reschedule

## 2020-08-13 ENCOUNTER — TELEPHONE (OUTPATIENT)
Dept: PAIN MEDICINE | Facility: CLINIC | Age: 65
End: 2020-08-13

## 2020-08-13 NOTE — TELEPHONE ENCOUNTER
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

## 2020-08-13 NOTE — TELEPHONE ENCOUNTER
lvm informing pt we are reaching out to see how he's doing after procedure and schedule a follow up if need be

## 2020-11-29 DIAGNOSIS — M46.1 SACROILIITIS: Primary | ICD-10-CM

## 2020-11-29 DIAGNOSIS — M47.9 OSTEOARTHRITIS OF SPINE, UNSPECIFIED SPINAL OSTEOARTHRITIS COMPLICATION STATUS, UNSPECIFIED SPINAL REGION: ICD-10-CM

## 2020-11-29 DIAGNOSIS — G57.00 PIRIFORMIS SYNDROME, UNSPECIFIED LATERALITY: ICD-10-CM

## 2020-11-29 DIAGNOSIS — M54.9 DORSALGIA, UNSPECIFIED: ICD-10-CM

## 2020-11-30 ENCOUNTER — TELEPHONE (OUTPATIENT)
Dept: PAIN MEDICINE | Facility: CLINIC | Age: 65
End: 2020-11-30

## 2020-11-30 NOTE — TELEPHONE ENCOUNTER
Called patient to schedule follow up appointment and inform of xray orders placed by Dr Reddy, no answer, left detailed voicemail message

## 2020-12-17 ENCOUNTER — HOSPITAL ENCOUNTER (OUTPATIENT)
Dept: RADIOLOGY | Facility: HOSPITAL | Age: 65
Discharge: HOME | End: 2020-12-17
Attending: ANESTHESIOLOGY
Payer: COMMERCIAL

## 2020-12-17 DIAGNOSIS — M47.9 OSTEOARTHRITIS OF SPINE, UNSPECIFIED SPINAL OSTEOARTHRITIS COMPLICATION STATUS, UNSPECIFIED SPINAL REGION: ICD-10-CM

## 2020-12-17 DIAGNOSIS — M54.9 DORSALGIA, UNSPECIFIED: ICD-10-CM

## 2020-12-17 DIAGNOSIS — M46.1 SACROILIITIS: ICD-10-CM

## 2020-12-17 PROCEDURE — 72110 X-RAY EXAM L-2 SPINE 4/>VWS: CPT | Mod: 26,,, | Performed by: RADIOLOGY

## 2020-12-17 PROCEDURE — 73521 X-RAY EXAM HIPS BI 2 VIEWS: CPT | Mod: TC

## 2020-12-17 PROCEDURE — 73521 XR HIPS BILATERAL 2 VIEW INCL AP PELVIS: ICD-10-PCS | Mod: 26,,, | Performed by: RADIOLOGY

## 2020-12-17 PROCEDURE — 72110 X-RAY EXAM L-2 SPINE 4/>VWS: CPT | Mod: TC

## 2020-12-17 PROCEDURE — 72110 XR LUMBAR SPINE 5 VIEW WITH FLEX AND EXT: ICD-10-PCS | Mod: 26,,, | Performed by: RADIOLOGY

## 2020-12-17 PROCEDURE — 73521 X-RAY EXAM HIPS BI 2 VIEWS: CPT | Mod: 26,,, | Performed by: RADIOLOGY

## 2021-01-05 DIAGNOSIS — M47.9 OSTEOARTHRITIS OF SPINE, UNSPECIFIED SPINAL OSTEOARTHRITIS COMPLICATION STATUS, UNSPECIFIED SPINAL REGION: ICD-10-CM

## 2021-01-05 DIAGNOSIS — M46.1 SACROILIITIS: Primary | ICD-10-CM

## 2021-01-12 ENCOUNTER — IMMUNIZATION (OUTPATIENT)
Dept: INTERNAL MEDICINE | Facility: CLINIC | Age: 66
End: 2021-01-12
Payer: COMMERCIAL

## 2021-01-12 ENCOUNTER — TELEPHONE (OUTPATIENT)
Dept: INTERNAL MEDICINE | Facility: CLINIC | Age: 66
End: 2021-01-12

## 2021-01-12 DIAGNOSIS — Z23 NEED FOR VACCINATION: Primary | ICD-10-CM

## 2021-01-12 PROCEDURE — 91300 COVID-19, MRNA, LNP-S, PF, 30 MCG/0.3 ML DOSE VACCINE: CPT | Mod: PBBFAC | Performed by: INTERNAL MEDICINE

## 2021-01-13 ENCOUNTER — CLINICAL SUPPORT (OUTPATIENT)
Dept: REHABILITATION | Facility: HOSPITAL | Age: 66
End: 2021-01-13
Attending: ANESTHESIOLOGY
Payer: COMMERCIAL

## 2021-01-13 DIAGNOSIS — M53.3 SI (SACROILIAC) PAIN: ICD-10-CM

## 2021-01-13 DIAGNOSIS — M46.1 SACROILIITIS: ICD-10-CM

## 2021-01-13 DIAGNOSIS — M53.2X6 LUMBAR SPINE INSTABILITY: ICD-10-CM

## 2021-01-13 DIAGNOSIS — M47.9 OSTEOARTHRITIS OF SPINE, UNSPECIFIED SPINAL OSTEOARTHRITIS COMPLICATION STATUS, UNSPECIFIED SPINAL REGION: ICD-10-CM

## 2021-01-13 DIAGNOSIS — M54.50 LUMBAR PAIN: ICD-10-CM

## 2021-01-13 DIAGNOSIS — R29.898 WEAKNESS OF BOTH LOWER EXTREMITIES: ICD-10-CM

## 2021-01-13 PROCEDURE — 97110 THERAPEUTIC EXERCISES: CPT | Mod: PO

## 2021-01-13 PROCEDURE — 97140 MANUAL THERAPY 1/> REGIONS: CPT | Mod: PO

## 2021-01-13 PROCEDURE — 97161 PT EVAL LOW COMPLEX 20 MIN: CPT | Mod: PO

## 2021-01-14 PROBLEM — M53.2X6 LUMBAR SPINE INSTABILITY: Status: ACTIVE | Noted: 2021-01-14

## 2021-01-14 PROBLEM — M54.50 LUMBAR PAIN: Status: ACTIVE | Noted: 2021-01-14

## 2021-01-14 PROBLEM — R29.898 LOWER EXTREMITY WEAKNESS: Status: ACTIVE | Noted: 2021-01-14

## 2021-01-14 PROBLEM — M53.3 SI (SACROILIAC) PAIN: Status: ACTIVE | Noted: 2021-01-14

## 2021-01-29 ENCOUNTER — OFFICE VISIT (OUTPATIENT)
Dept: DERMATOLOGY | Facility: CLINIC | Age: 66
End: 2021-01-29
Payer: COMMERCIAL

## 2021-01-29 DIAGNOSIS — D18.01 ANGIOMA OF SKIN: ICD-10-CM

## 2021-01-29 DIAGNOSIS — L57.0 AK (ACTINIC KERATOSIS): Primary | ICD-10-CM

## 2021-01-29 DIAGNOSIS — L81.4 LENTIGO: ICD-10-CM

## 2021-01-29 DIAGNOSIS — Z85.828 HISTORY OF NONMELANOMA SKIN CANCER: ICD-10-CM

## 2021-01-29 DIAGNOSIS — L91.8 SKIN TAG: ICD-10-CM

## 2021-01-29 PROCEDURE — 99999 PR PBB SHADOW E&M-EST. PATIENT-LVL II: CPT | Mod: PBBFAC,,, | Performed by: DERMATOLOGY

## 2021-01-29 PROCEDURE — 99213 OFFICE O/P EST LOW 20 MIN: CPT | Mod: 25,S$GLB,, | Performed by: DERMATOLOGY

## 2021-01-29 PROCEDURE — 17003 DESTRUCTION, PREMALIGNANT LESIONS; SECOND THROUGH 14 LESIONS: ICD-10-PCS | Mod: S$GLB,,, | Performed by: DERMATOLOGY

## 2021-01-29 PROCEDURE — 99999 PR PBB SHADOW E&M-EST. PATIENT-LVL II: ICD-10-PCS | Mod: PBBFAC,,, | Performed by: DERMATOLOGY

## 2021-01-29 PROCEDURE — 99213 PR OFFICE/OUTPT VISIT, EST, LEVL III, 20-29 MIN: ICD-10-PCS | Mod: 25,S$GLB,, | Performed by: DERMATOLOGY

## 2021-01-29 PROCEDURE — 1126F AMNT PAIN NOTED NONE PRSNT: CPT | Mod: S$GLB,,, | Performed by: DERMATOLOGY

## 2021-01-29 PROCEDURE — 17003 DESTRUCT PREMALG LES 2-14: CPT | Mod: S$GLB,,, | Performed by: DERMATOLOGY

## 2021-01-29 PROCEDURE — 17000 PR DESTRUCTION(LASER SURGERY,CRYOSURGERY,CHEMOSURGERY),PREMALIGNANT LESIONS,FIRST LESION: ICD-10-PCS | Mod: S$GLB,,, | Performed by: DERMATOLOGY

## 2021-01-29 PROCEDURE — 17000 DESTRUCT PREMALG LESION: CPT | Mod: S$GLB,,, | Performed by: DERMATOLOGY

## 2021-01-29 PROCEDURE — 1126F PR PAIN SEVERITY QUANTIFIED, NO PAIN PRESENT: ICD-10-PCS | Mod: S$GLB,,, | Performed by: DERMATOLOGY

## 2021-01-31 ENCOUNTER — IMMUNIZATION (OUTPATIENT)
Dept: INTERNAL MEDICINE | Facility: CLINIC | Age: 66
End: 2021-01-31
Payer: COMMERCIAL

## 2021-01-31 DIAGNOSIS — Z23 NEED FOR VACCINATION: Primary | ICD-10-CM

## 2021-01-31 PROCEDURE — 91300 PR SARS-COV- 2 COVID-19 VACCINE, NO PRSV, 30MCG/0.3ML, IM: CPT | Mod: PBBFAC | Performed by: INTERNAL MEDICINE

## 2021-01-31 PROCEDURE — 0002A PR IMMUNIZ ADMIN, SARS-COV-2 COVID-19 VACC, 30MCG/0.3ML, 2ND DOSE: CPT | Mod: PBBFAC | Performed by: INTERNAL MEDICINE

## 2021-01-31 RX ADMIN — RNA INGREDIENT BNT-162B2 0.3 ML: 0.23 INJECTION, SUSPENSION INTRAMUSCULAR at 01:01

## 2021-02-08 ENCOUNTER — CLINICAL SUPPORT (OUTPATIENT)
Dept: REHABILITATION | Facility: HOSPITAL | Age: 66
End: 2021-02-08
Attending: ANESTHESIOLOGY
Payer: COMMERCIAL

## 2021-02-08 DIAGNOSIS — M53.2X6 LUMBAR SPINE INSTABILITY: ICD-10-CM

## 2021-02-08 DIAGNOSIS — M54.50 LUMBAR PAIN: ICD-10-CM

## 2021-02-08 DIAGNOSIS — M53.3 SI (SACROILIAC) PAIN: ICD-10-CM

## 2021-02-08 DIAGNOSIS — R29.898 WEAKNESS OF BOTH LOWER EXTREMITIES: ICD-10-CM

## 2021-02-08 PROCEDURE — 97110 THERAPEUTIC EXERCISES: CPT | Mod: PO

## 2021-02-08 PROCEDURE — 97140 MANUAL THERAPY 1/> REGIONS: CPT | Mod: PO

## 2021-02-18 ENCOUNTER — CLINICAL SUPPORT (OUTPATIENT)
Dept: REHABILITATION | Facility: HOSPITAL | Age: 66
End: 2021-02-18
Attending: ANESTHESIOLOGY
Payer: COMMERCIAL

## 2021-02-18 DIAGNOSIS — M54.50 LUMBAR PAIN: ICD-10-CM

## 2021-02-18 DIAGNOSIS — R29.898 WEAKNESS OF BOTH LOWER EXTREMITIES: ICD-10-CM

## 2021-02-18 DIAGNOSIS — M53.3 SI (SACROILIAC) PAIN: ICD-10-CM

## 2021-02-18 DIAGNOSIS — M53.2X6 LUMBAR SPINE INSTABILITY: ICD-10-CM

## 2021-02-18 PROCEDURE — 97110 THERAPEUTIC EXERCISES: CPT | Mod: PO

## 2021-02-18 PROCEDURE — 97140 MANUAL THERAPY 1/> REGIONS: CPT | Mod: PO

## 2021-02-24 ENCOUNTER — OFFICE VISIT (OUTPATIENT)
Dept: OPTOMETRY | Facility: CLINIC | Age: 66
End: 2021-02-24

## 2021-02-24 ENCOUNTER — OFFICE VISIT (OUTPATIENT)
Dept: OPTOMETRY | Facility: CLINIC | Age: 66
End: 2021-02-24
Payer: COMMERCIAL

## 2021-02-24 DIAGNOSIS — H31.001 CHORIORETINAL SCAR OF RIGHT EYE: ICD-10-CM

## 2021-02-24 DIAGNOSIS — Z46.0 FITTING AND ADJUSTMENT OF SPECTACLES AND CONTACT LENSES: ICD-10-CM

## 2021-02-24 DIAGNOSIS — H43.811 PVD (POSTERIOR VITREOUS DETACHMENT), RIGHT EYE: ICD-10-CM

## 2021-02-24 DIAGNOSIS — H52.13 MYOPIA, BILATERAL: Primary | ICD-10-CM

## 2021-02-24 DIAGNOSIS — H52.4 PRESBYOPIA: ICD-10-CM

## 2021-02-24 DIAGNOSIS — Z46.0 FITTING AND ADJUSTMENT OF SPECTACLES AND CONTACT LENSES: Primary | ICD-10-CM

## 2021-02-24 DIAGNOSIS — H25.13 NS (NUCLEAR SCLEROSIS), BILATERAL: ICD-10-CM

## 2021-02-24 PROCEDURE — 92015 PR REFRACTION: ICD-10-PCS | Mod: S$GLB,,, | Performed by: OPTOMETRIST

## 2021-02-24 PROCEDURE — 1126F PR PAIN SEVERITY QUANTIFIED, NO PAIN PRESENT: ICD-10-PCS | Mod: S$GLB,,, | Performed by: OPTOMETRIST

## 2021-02-24 PROCEDURE — 99999 PR PBB SHADOW E&M-EST. PATIENT-LVL I: CPT | Mod: PBBFAC,,, | Performed by: OPTOMETRIST

## 2021-02-24 PROCEDURE — 99999 PR PBB SHADOW E&M-EST. PATIENT-LVL II: ICD-10-PCS | Mod: PBBFAC,,, | Performed by: OPTOMETRIST

## 2021-02-24 PROCEDURE — 99999 PR PBB SHADOW E&M-EST. PATIENT-LVL II: CPT | Mod: PBBFAC,,, | Performed by: OPTOMETRIST

## 2021-02-24 PROCEDURE — 92310 CONTACT LENS FITTING OU: CPT | Mod: CSM,,, | Performed by: OPTOMETRIST

## 2021-02-24 PROCEDURE — 92310 PR CONTACT LENS FITTING (NO CHANGE): ICD-10-PCS | Mod: CSM,,, | Performed by: OPTOMETRIST

## 2021-02-24 PROCEDURE — 92014 PR EYE EXAM, EST PATIENT,COMPREHESV: ICD-10-PCS | Mod: S$GLB,,, | Performed by: OPTOMETRIST

## 2021-02-24 PROCEDURE — 92014 COMPRE OPH EXAM EST PT 1/>: CPT | Mod: S$GLB,,, | Performed by: OPTOMETRIST

## 2021-02-24 PROCEDURE — 1126F AMNT PAIN NOTED NONE PRSNT: CPT | Mod: S$GLB,,, | Performed by: OPTOMETRIST

## 2021-02-24 PROCEDURE — 99999 PR PBB SHADOW E&M-EST. PATIENT-LVL I: ICD-10-PCS | Mod: PBBFAC,,, | Performed by: OPTOMETRIST

## 2021-02-24 PROCEDURE — 92015 DETERMINE REFRACTIVE STATE: CPT | Mod: S$GLB,,, | Performed by: OPTOMETRIST

## 2021-03-18 ENCOUNTER — PATIENT MESSAGE (OUTPATIENT)
Dept: RESEARCH | Facility: HOSPITAL | Age: 66
End: 2021-03-18

## 2021-03-26 ENCOUNTER — PATIENT MESSAGE (OUTPATIENT)
Dept: RESEARCH | Facility: HOSPITAL | Age: 66
End: 2021-03-26

## 2021-03-31 ENCOUNTER — TELEPHONE (OUTPATIENT)
Dept: PAIN MEDICINE | Facility: CLINIC | Age: 66
End: 2021-03-31

## 2021-04-07 ENCOUNTER — HOSPITAL ENCOUNTER (OUTPATIENT)
Facility: OTHER | Age: 66
Discharge: HOME | End: 2021-04-07
Attending: ANESTHESIOLOGY | Admitting: ANESTHESIOLOGY
Payer: COMMERCIAL

## 2021-04-07 VITALS
TEMPERATURE: 98 F | WEIGHT: 200 LBS | OXYGEN SATURATION: 99 % | SYSTOLIC BLOOD PRESSURE: 171 MMHG | HEIGHT: 70 IN | HEART RATE: 58 BPM | DIASTOLIC BLOOD PRESSURE: 89 MMHG | BODY MASS INDEX: 28.63 KG/M2 | RESPIRATION RATE: 14 BRPM

## 2021-04-07 DIAGNOSIS — M46.1 SACROILIITIS: Primary | ICD-10-CM

## 2021-04-07 PROCEDURE — 63600175 PHARM REV CODE 636 W HCPCS: Performed by: ANESTHESIOLOGY

## 2021-04-07 PROCEDURE — 27096 PR INJECTION,SACROILIAC JOINT: ICD-10-PCS | Mod: RT,,, | Performed by: ANESTHESIOLOGY

## 2021-04-07 PROCEDURE — 25500020 PHARM REV CODE 255: Performed by: ANESTHESIOLOGY

## 2021-04-07 PROCEDURE — 20552 NJX 1/MLT TRIGGER POINT 1/2: CPT | Mod: 59 | Performed by: ANESTHESIOLOGY

## 2021-04-07 PROCEDURE — 27096 INJECT SACROILIAC JOINT: CPT | Mod: RT,,, | Performed by: ANESTHESIOLOGY

## 2021-04-07 PROCEDURE — 20552 PR INJECT TRIGGER POINT, 1 OR 2: ICD-10-PCS | Mod: 59,,, | Performed by: ANESTHESIOLOGY

## 2021-04-07 PROCEDURE — 20552 NJX 1/MLT TRIGGER POINT 1/2: CPT | Mod: 59,,, | Performed by: ANESTHESIOLOGY

## 2021-04-07 PROCEDURE — 27096 INJECT SACROILIAC JOINT: CPT | Mod: RT | Performed by: ANESTHESIOLOGY

## 2021-04-07 PROCEDURE — 25000003 PHARM REV CODE 250: Performed by: ANESTHESIOLOGY

## 2021-04-07 RX ORDER — BUPIVACAINE HYDROCHLORIDE 2.5 MG/ML
INJECTION, SOLUTION EPIDURAL; INFILTRATION; INTRACAUDAL
Status: DISCONTINUED | OUTPATIENT
Start: 2021-04-07 | End: 2021-04-07 | Stop reason: HOSPADM

## 2021-04-07 RX ORDER — TRIAMCINOLONE ACETONIDE 40 MG/ML
INJECTION, SUSPENSION INTRA-ARTICULAR; INTRAMUSCULAR
Status: DISCONTINUED | OUTPATIENT
Start: 2021-04-07 | End: 2021-04-07 | Stop reason: HOSPADM

## 2021-04-07 RX ORDER — LIDOCAINE HYDROCHLORIDE 10 MG/ML
INJECTION INFILTRATION; PERINEURAL
Status: DISCONTINUED | OUTPATIENT
Start: 2021-04-07 | End: 2021-04-07 | Stop reason: HOSPADM

## 2021-04-07 RX ORDER — SODIUM CHLORIDE 9 MG/ML
INJECTION, SOLUTION INTRAVENOUS CONTINUOUS
Status: ACTIVE | OUTPATIENT
Start: 2021-04-07

## 2021-09-21 DIAGNOSIS — R53.83 FATIGUE, UNSPECIFIED TYPE: Primary | ICD-10-CM

## 2021-10-07 ENCOUNTER — IMMUNIZATION (OUTPATIENT)
Dept: INTERNAL MEDICINE | Facility: CLINIC | Age: 66
End: 2021-10-07
Payer: COMMERCIAL

## 2021-10-07 DIAGNOSIS — Z23 NEED FOR VACCINATION: Primary | ICD-10-CM

## 2021-10-07 PROCEDURE — 91300 COVID-19, MRNA, LNP-S, PF, 30 MCG/0.3 ML DOSE VACCINE: CPT | Mod: PBBFAC | Performed by: INTERNAL MEDICINE

## 2021-10-07 PROCEDURE — 0003A COVID-19, MRNA, LNP-S, PF, 30 MCG/0.3 ML DOSE VACCINE: CPT | Mod: CV19,PBBFAC | Performed by: INTERNAL MEDICINE

## 2021-10-25 ENCOUNTER — OFFICE VISIT (OUTPATIENT)
Dept: DERMATOLOGY | Facility: CLINIC | Age: 66
End: 2021-10-25
Payer: COMMERCIAL

## 2021-10-25 DIAGNOSIS — L82.1 SEBORRHEIC KERATOSES: ICD-10-CM

## 2021-10-25 DIAGNOSIS — D48.5 NEOPLASM OF UNCERTAIN BEHAVIOR OF SKIN: Primary | ICD-10-CM

## 2021-10-25 PROCEDURE — 1159F PR MEDICATION LIST DOCUMENTED IN MEDICAL RECORD: ICD-10-PCS | Mod: CPTII,S$GLB,, | Performed by: STUDENT IN AN ORGANIZED HEALTH CARE EDUCATION/TRAINING PROGRAM

## 2021-10-25 PROCEDURE — 88305 TISSUE EXAM BY PATHOLOGIST: CPT | Mod: 26,,, | Performed by: PATHOLOGY

## 2021-10-25 PROCEDURE — 99213 OFFICE O/P EST LOW 20 MIN: CPT | Mod: 25,S$GLB,, | Performed by: STUDENT IN AN ORGANIZED HEALTH CARE EDUCATION/TRAINING PROGRAM

## 2021-10-25 PROCEDURE — 99999 PR PBB SHADOW E&M-EST. PATIENT-LVL II: ICD-10-PCS | Mod: PBBFAC,,, | Performed by: STUDENT IN AN ORGANIZED HEALTH CARE EDUCATION/TRAINING PROGRAM

## 2021-10-25 PROCEDURE — 99213 PR OFFICE/OUTPT VISIT, EST, LEVL III, 20-29 MIN: ICD-10-PCS | Mod: 25,S$GLB,, | Performed by: STUDENT IN AN ORGANIZED HEALTH CARE EDUCATION/TRAINING PROGRAM

## 2021-10-25 PROCEDURE — 1159F MED LIST DOCD IN RCRD: CPT | Mod: CPTII,S$GLB,, | Performed by: STUDENT IN AN ORGANIZED HEALTH CARE EDUCATION/TRAINING PROGRAM

## 2021-10-25 PROCEDURE — 11102 PR TANGENTIAL BIOPSY, SKIN, SINGLE LESION: ICD-10-PCS | Mod: S$GLB,,, | Performed by: STUDENT IN AN ORGANIZED HEALTH CARE EDUCATION/TRAINING PROGRAM

## 2021-10-25 PROCEDURE — 88305 TISSUE EXAM BY PATHOLOGIST: CPT | Performed by: PATHOLOGY

## 2021-10-25 PROCEDURE — 99999 PR PBB SHADOW E&M-EST. PATIENT-LVL II: CPT | Mod: PBBFAC,,, | Performed by: STUDENT IN AN ORGANIZED HEALTH CARE EDUCATION/TRAINING PROGRAM

## 2021-10-25 PROCEDURE — 1160F PR REVIEW ALL MEDS BY PRESCRIBER/CLIN PHARMACIST DOCUMENTED: ICD-10-PCS | Mod: CPTII,S$GLB,, | Performed by: STUDENT IN AN ORGANIZED HEALTH CARE EDUCATION/TRAINING PROGRAM

## 2021-10-25 PROCEDURE — 1160F RVW MEDS BY RX/DR IN RCRD: CPT | Mod: CPTII,S$GLB,, | Performed by: STUDENT IN AN ORGANIZED HEALTH CARE EDUCATION/TRAINING PROGRAM

## 2021-10-25 PROCEDURE — 11102 TANGNTL BX SKIN SINGLE LES: CPT | Mod: S$GLB,,, | Performed by: STUDENT IN AN ORGANIZED HEALTH CARE EDUCATION/TRAINING PROGRAM

## 2021-10-25 PROCEDURE — 88305 TISSUE EXAM BY PATHOLOGIST: ICD-10-PCS | Mod: 26,,, | Performed by: PATHOLOGY

## 2021-10-28 ENCOUNTER — PATIENT MESSAGE (OUTPATIENT)
Dept: DERMATOLOGY | Facility: CLINIC | Age: 66
End: 2021-10-28
Payer: COMMERCIAL

## 2021-10-28 LAB
FINAL PATHOLOGIC DIAGNOSIS: NORMAL
GROSS: NORMAL
Lab: NORMAL
MICROSCOPIC EXAM: NORMAL

## 2021-12-29 ENCOUNTER — OFFICE VISIT (OUTPATIENT)
Dept: INTERNAL MEDICINE | Facility: CLINIC | Age: 66
End: 2021-12-29
Payer: COMMERCIAL

## 2021-12-29 ENCOUNTER — CLINICAL SUPPORT (OUTPATIENT)
Dept: INTERNAL MEDICINE | Facility: CLINIC | Age: 66
End: 2021-12-29
Payer: COMMERCIAL

## 2021-12-29 VITALS
WEIGHT: 201.81 LBS | TEMPERATURE: 98 F | HEART RATE: 56 BPM | HEIGHT: 70 IN | BODY MASS INDEX: 28.89 KG/M2 | SYSTOLIC BLOOD PRESSURE: 119 MMHG | DIASTOLIC BLOOD PRESSURE: 85 MMHG

## 2021-12-29 DIAGNOSIS — Z12.11 COLON CANCER SCREENING: ICD-10-CM

## 2021-12-29 DIAGNOSIS — Z00.00 ENCOUNTER FOR ANNUAL HEALTH EXAMINATION: ICD-10-CM

## 2021-12-29 DIAGNOSIS — Z23 NEED FOR SHINGLES VACCINE: Primary | ICD-10-CM

## 2021-12-29 DIAGNOSIS — Z00.00 ROUTINE GENERAL MEDICAL EXAMINATION AT A HEALTH CARE FACILITY: Primary | ICD-10-CM

## 2021-12-29 LAB
ALBUMIN SERPL BCP-MCNC: 3.7 G/DL (ref 3.5–5.2)
ALP SERPL-CCNC: 66 U/L (ref 55–135)
ALT SERPL W/O P-5'-P-CCNC: 26 U/L (ref 10–44)
ANION GAP SERPL CALC-SCNC: 7 MMOL/L (ref 8–16)
AST SERPL-CCNC: 21 U/L (ref 10–40)
BILIRUB SERPL-MCNC: 0.7 MG/DL (ref 0.1–1)
BUN SERPL-MCNC: 12 MG/DL (ref 8–23)
CALCIUM SERPL-MCNC: 8.7 MG/DL (ref 8.7–10.5)
CHLORIDE SERPL-SCNC: 109 MMOL/L (ref 95–110)
CHOLEST SERPL-MCNC: 203 MG/DL (ref 120–199)
CHOLEST/HDLC SERPL: 3.1 {RATIO} (ref 2–5)
CO2 SERPL-SCNC: 23 MMOL/L (ref 23–29)
COMPLEXED PSA SERPL-MCNC: <0.01 NG/ML (ref 0–4)
CREAT SERPL-MCNC: 0.9 MG/DL (ref 0.5–1.4)
ERYTHROCYTE [DISTWIDTH] IN BLOOD BY AUTOMATED COUNT: 13 % (ref 11.5–14.5)
EST. GFR  (AFRICAN AMERICAN): >60 ML/MIN/1.73 M^2
EST. GFR  (NON AFRICAN AMERICAN): >60 ML/MIN/1.73 M^2
ESTIMATED AVG GLUCOSE: 103 MG/DL (ref 68–131)
GLUCOSE SERPL-MCNC: 96 MG/DL (ref 70–110)
HBA1C MFR BLD: 5.2 % (ref 4–5.6)
HCT VFR BLD AUTO: 46.8 % (ref 40–54)
HDLC SERPL-MCNC: 65 MG/DL (ref 40–75)
HDLC SERPL: 32 % (ref 20–50)
HGB BLD-MCNC: 15.7 G/DL (ref 14–18)
LDLC SERPL CALC-MCNC: 122.8 MG/DL (ref 63–159)
MCH RBC QN AUTO: 32 PG (ref 27–31)
MCHC RBC AUTO-ENTMCNC: 33.5 G/DL (ref 32–36)
MCV RBC AUTO: 95 FL (ref 82–98)
NONHDLC SERPL-MCNC: 138 MG/DL
PLATELET # BLD AUTO: 238 K/UL (ref 150–450)
PMV BLD AUTO: 9.7 FL (ref 9.2–12.9)
POTASSIUM SERPL-SCNC: 4.3 MMOL/L (ref 3.5–5.1)
PROT SERPL-MCNC: 6.7 G/DL (ref 6–8.4)
RBC # BLD AUTO: 4.91 M/UL (ref 4.6–6.2)
SODIUM SERPL-SCNC: 139 MMOL/L (ref 136–145)
TRIGL SERPL-MCNC: 76 MG/DL (ref 30–150)
TSH SERPL DL<=0.005 MIU/L-ACNC: 2.91 UIU/ML (ref 0.4–4)
WBC # BLD AUTO: 4.97 K/UL (ref 3.9–12.7)

## 2021-12-29 PROCEDURE — 99999 PR PBB SHADOW E&M-EST. PATIENT-LVL III: ICD-10-PCS | Mod: PBBFAC,,, | Performed by: INTERNAL MEDICINE

## 2021-12-29 PROCEDURE — 90750 ZOSTER RECOMBINANT VACCINE: ICD-10-PCS | Mod: S$GLB,,, | Performed by: INTERNAL MEDICINE

## 2021-12-29 PROCEDURE — 3079F DIAST BP 80-89 MM HG: CPT | Mod: CPTII,S$GLB,, | Performed by: INTERNAL MEDICINE

## 2021-12-29 PROCEDURE — 85027 COMPLETE CBC AUTOMATED: CPT | Performed by: INTERNAL MEDICINE

## 2021-12-29 PROCEDURE — 3079F PR MOST RECENT DIASTOLIC BLOOD PRESSURE 80-89 MM HG: ICD-10-PCS | Mod: CPTII,S$GLB,, | Performed by: INTERNAL MEDICINE

## 2021-12-29 PROCEDURE — 3008F BODY MASS INDEX DOCD: CPT | Mod: CPTII,S$GLB,, | Performed by: INTERNAL MEDICINE

## 2021-12-29 PROCEDURE — 84443 ASSAY THYROID STIM HORMONE: CPT | Performed by: INTERNAL MEDICINE

## 2021-12-29 PROCEDURE — 3074F SYST BP LT 130 MM HG: CPT | Mod: CPTII,S$GLB,, | Performed by: INTERNAL MEDICINE

## 2021-12-29 PROCEDURE — 83036 HEMOGLOBIN GLYCOSYLATED A1C: CPT | Performed by: INTERNAL MEDICINE

## 2021-12-29 PROCEDURE — 80061 LIPID PANEL: CPT | Performed by: INTERNAL MEDICINE

## 2021-12-29 PROCEDURE — 3074F PR MOST RECENT SYSTOLIC BLOOD PRESSURE < 130 MM HG: ICD-10-PCS | Mod: CPTII,S$GLB,, | Performed by: INTERNAL MEDICINE

## 2021-12-29 PROCEDURE — 99396 PREV VISIT EST AGE 40-64: CPT | Mod: S$GLB,,, | Performed by: INTERNAL MEDICINE

## 2021-12-29 PROCEDURE — 3044F HG A1C LEVEL LT 7.0%: CPT | Mod: CPTII,S$GLB,, | Performed by: INTERNAL MEDICINE

## 2021-12-29 PROCEDURE — 3044F PR MOST RECENT HEMOGLOBIN A1C LEVEL <7.0%: ICD-10-PCS | Mod: CPTII,S$GLB,, | Performed by: INTERNAL MEDICINE

## 2021-12-29 PROCEDURE — 99999 PR PBB SHADOW E&M-EST. PATIENT-LVL III: CPT | Mod: PBBFAC,,, | Performed by: INTERNAL MEDICINE

## 2021-12-29 PROCEDURE — 90471 IMMUNIZATION ADMIN: CPT | Mod: S$GLB,,, | Performed by: INTERNAL MEDICINE

## 2021-12-29 PROCEDURE — 90471 ZOSTER RECOMBINANT VACCINE: ICD-10-PCS | Mod: S$GLB,,, | Performed by: INTERNAL MEDICINE

## 2021-12-29 PROCEDURE — 80053 COMPREHEN METABOLIC PANEL: CPT | Performed by: INTERNAL MEDICINE

## 2021-12-29 PROCEDURE — 90750 HZV VACC RECOMBINANT IM: CPT | Mod: S$GLB,,, | Performed by: INTERNAL MEDICINE

## 2021-12-29 PROCEDURE — 84153 ASSAY OF PSA TOTAL: CPT | Performed by: INTERNAL MEDICINE

## 2021-12-29 PROCEDURE — 99396 PR PREVENTIVE VISIT,EST,40-64: ICD-10-PCS | Mod: S$GLB,,, | Performed by: INTERNAL MEDICINE

## 2021-12-29 PROCEDURE — 3008F PR BODY MASS INDEX (BMI) DOCUMENTED: ICD-10-PCS | Mod: CPTII,S$GLB,, | Performed by: INTERNAL MEDICINE

## 2021-12-30 ENCOUNTER — PATIENT MESSAGE (OUTPATIENT)
Dept: ADMINISTRATIVE | Facility: OTHER | Age: 66
End: 2021-12-30
Payer: COMMERCIAL

## 2021-12-30 ENCOUNTER — LAB VISIT (OUTPATIENT)
Dept: PRIMARY CARE CLINIC | Facility: OTHER | Age: 66
End: 2021-12-30
Payer: COMMERCIAL

## 2021-12-30 DIAGNOSIS — Z20.822 COVID-19 RULED OUT: Primary | ICD-10-CM

## 2021-12-30 LAB
CTP QC/QA: YES
SARS-COV-2 AG RESP QL IA.RAPID: NEGATIVE

## 2021-12-30 PROCEDURE — 87811 SARS CORONAVIRUS 2 ANTIGEN POCT, MANUAL READ: ICD-10-PCS | Mod: ,,, | Performed by: PREVENTIVE MEDICINE

## 2021-12-30 PROCEDURE — 87811 SARS-COV-2 COVID19 W/OPTIC: CPT | Mod: ,,, | Performed by: PREVENTIVE MEDICINE

## 2022-08-01 ENCOUNTER — HOSPITAL ENCOUNTER (OUTPATIENT)
Dept: RADIOLOGY | Facility: HOSPITAL | Age: 67
Discharge: HOME | End: 2022-08-01
Attending: ORTHOPAEDIC SURGERY
Payer: COMMERCIAL

## 2022-08-01 ENCOUNTER — OFFICE VISIT (OUTPATIENT)
Dept: SPORTS MEDICINE | Facility: CLINIC | Age: 67
End: 2022-08-01
Payer: COMMERCIAL

## 2022-08-01 VITALS
DIASTOLIC BLOOD PRESSURE: 78 MMHG | WEIGHT: 200 LBS | BODY MASS INDEX: 28.63 KG/M2 | HEIGHT: 70 IN | SYSTOLIC BLOOD PRESSURE: 119 MMHG | HEART RATE: 75 BPM

## 2022-08-01 DIAGNOSIS — M25.562 CHRONIC PAIN OF LEFT KNEE: ICD-10-CM

## 2022-08-01 DIAGNOSIS — G89.29 CHRONIC PAIN OF LEFT KNEE: ICD-10-CM

## 2022-08-01 DIAGNOSIS — M54.9 BACK PAIN, UNSPECIFIED BACK LOCATION, UNSPECIFIED BACK PAIN LATERALITY, UNSPECIFIED CHRONICITY: ICD-10-CM

## 2022-08-01 DIAGNOSIS — R52 PAIN: ICD-10-CM

## 2022-08-01 DIAGNOSIS — M54.41 CHRONIC RIGHT-SIDED LOW BACK PAIN WITH RIGHT-SIDED SCIATICA: ICD-10-CM

## 2022-08-01 DIAGNOSIS — M54.41 CHRONIC RIGHT-SIDED LOW BACK PAIN WITH RIGHT-SIDED SCIATICA: Primary | ICD-10-CM

## 2022-08-01 DIAGNOSIS — M46.1 SACROILIITIS: ICD-10-CM

## 2022-08-01 DIAGNOSIS — G89.29 CHRONIC RIGHT-SIDED LOW BACK PAIN WITH RIGHT-SIDED SCIATICA: Primary | ICD-10-CM

## 2022-08-01 DIAGNOSIS — M11.262: ICD-10-CM

## 2022-08-01 DIAGNOSIS — G89.29 CHRONIC RIGHT-SIDED LOW BACK PAIN WITH RIGHT-SIDED SCIATICA: ICD-10-CM

## 2022-08-01 PROCEDURE — 73562 XR KNEE ORTHO LEFT WITH FLEXION: ICD-10-PCS | Mod: 26,RT,, | Performed by: RADIOLOGY

## 2022-08-01 PROCEDURE — 72170 XR PELVIS ROUTINE AP: ICD-10-PCS | Mod: 26,,, | Performed by: RADIOLOGY

## 2022-08-01 PROCEDURE — 72114 XR LUMBAR SPINE 5 VIEW WITH FLEX AND EXT: ICD-10-PCS | Mod: 26,,, | Performed by: RADIOLOGY

## 2022-08-01 PROCEDURE — 3074F SYST BP LT 130 MM HG: CPT | Mod: CPTII,S$GLB,, | Performed by: ORTHOPAEDIC SURGERY

## 2022-08-01 PROCEDURE — 73564 X-RAY EXAM KNEE 4 OR MORE: CPT | Mod: 26,LT,, | Performed by: RADIOLOGY

## 2022-08-01 PROCEDURE — 72148 MRI LUMBAR SPINE W/O DYE: CPT | Mod: TC

## 2022-08-01 PROCEDURE — 3074F PR MOST RECENT SYSTOLIC BLOOD PRESSURE < 130 MM HG: ICD-10-PCS | Mod: CPTII,S$GLB,, | Performed by: ORTHOPAEDIC SURGERY

## 2022-08-01 PROCEDURE — 73721 MRI KNEE WITHOUT CONTRAST LEFT: ICD-10-PCS | Mod: 26,LT,, | Performed by: RADIOLOGY

## 2022-08-01 PROCEDURE — 72148 MRI LUMBAR SPINE W/O DYE: CPT | Mod: 26,,, | Performed by: RADIOLOGY

## 2022-08-01 PROCEDURE — 99999 PR PBB SHADOW E&M-EST. PATIENT-LVL III: CPT | Mod: PBBFAC,,, | Performed by: ORTHOPAEDIC SURGERY

## 2022-08-01 PROCEDURE — 1159F MED LIST DOCD IN RCRD: CPT | Mod: CPTII,S$GLB,, | Performed by: ORTHOPAEDIC SURGERY

## 2022-08-01 PROCEDURE — 73562 X-RAY EXAM OF KNEE 3: CPT | Mod: TC,RT

## 2022-08-01 PROCEDURE — 72148 MRI LUMBAR SPINE WITHOUT CONTRAST: ICD-10-PCS | Mod: 26,,, | Performed by: RADIOLOGY

## 2022-08-01 PROCEDURE — 3008F PR BODY MASS INDEX (BMI) DOCUMENTED: ICD-10-PCS | Mod: CPTII,S$GLB,, | Performed by: ORTHOPAEDIC SURGERY

## 2022-08-01 PROCEDURE — 3008F BODY MASS INDEX DOCD: CPT | Mod: CPTII,S$GLB,, | Performed by: ORTHOPAEDIC SURGERY

## 2022-08-01 PROCEDURE — 1159F PR MEDICATION LIST DOCUMENTED IN MEDICAL RECORD: ICD-10-PCS | Mod: CPTII,S$GLB,, | Performed by: ORTHOPAEDIC SURGERY

## 2022-08-01 PROCEDURE — 73721 MRI JNT OF LWR EXTRE W/O DYE: CPT | Mod: 26,LT,, | Performed by: RADIOLOGY

## 2022-08-01 PROCEDURE — 73562 X-RAY EXAM OF KNEE 3: CPT | Mod: 26,RT,, | Performed by: RADIOLOGY

## 2022-08-01 PROCEDURE — 72114 X-RAY EXAM L-S SPINE BENDING: CPT | Mod: 26,,, | Performed by: RADIOLOGY

## 2022-08-01 PROCEDURE — 73721 MRI JNT OF LWR EXTRE W/O DYE: CPT | Mod: TC,LT

## 2022-08-01 PROCEDURE — 72170 X-RAY EXAM OF PELVIS: CPT | Mod: TC

## 2022-08-01 PROCEDURE — 3078F DIAST BP <80 MM HG: CPT | Mod: CPTII,S$GLB,, | Performed by: ORTHOPAEDIC SURGERY

## 2022-08-01 PROCEDURE — 72170 X-RAY EXAM OF PELVIS: CPT | Mod: 26,,, | Performed by: RADIOLOGY

## 2022-08-01 PROCEDURE — 3078F PR MOST RECENT DIASTOLIC BLOOD PRESSURE < 80 MM HG: ICD-10-PCS | Mod: CPTII,S$GLB,, | Performed by: ORTHOPAEDIC SURGERY

## 2022-08-01 PROCEDURE — 72114 X-RAY EXAM L-S SPINE BENDING: CPT | Mod: TC

## 2022-08-01 PROCEDURE — 99204 OFFICE O/P NEW MOD 45 MIN: CPT | Mod: S$GLB,,, | Performed by: ORTHOPAEDIC SURGERY

## 2022-08-01 PROCEDURE — 99999 PR PBB SHADOW E&M-EST. PATIENT-LVL III: ICD-10-PCS | Mod: PBBFAC,,, | Performed by: ORTHOPAEDIC SURGERY

## 2022-08-01 PROCEDURE — 99204 PR OFFICE/OUTPT VISIT, NEW, LEVL IV, 45-59 MIN: ICD-10-PCS | Mod: S$GLB,,, | Performed by: ORTHOPAEDIC SURGERY

## 2022-08-01 PROCEDURE — 73564 XR KNEE ORTHO LEFT WITH FLEXION: ICD-10-PCS | Mod: 26,LT,, | Performed by: RADIOLOGY

## 2022-08-01 NOTE — PROGRESS NOTES
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 He presents as a new patient to me. He reports multiple significant pain locations which are significant. He reports acute on chronic left knee pain which is medially based. He describes associated intermittent prominent mechanical symptoms with locking and catching. He also has intermittent swelling. Pain is significant, alters his gait and is activity limiting. PMH is notable for prior traumatic ACL tear approximately 20 yrs ago treated with HS auto ACL recon at [facility]. He initially did well after that surgery and denies a specific discrete repeat injury event. Current pain continues >6 mos despite conservative treatment with oral meds, rest, activity mods, and therapy. Worsened during a trip to [place] over the weekend. Now he has difficulty walking. He also reports a longstanding history of low back pain, right sided buttock and proximal thigh radiculopathy and SI joint pathology. He reports prior L4-5 level MARA years ago. 2 right sided SI joint CSI with Dr. Roosevelt Reddy. Approximately 50% relief with each SI injection.  He reports that he has not had any specific spine surgery consultation in the past.  He does describe some baseline low back pain and referred symptoms which altered gait.  Prior treatment has also included therapy and oral medication.    Pain Score:   7    REVIEW OF SYSTEMS:   Constitution: Negative. Negative for chills, fever and night sweats.    Hematologic/Lymphatic: Negative for bleeding problem. Does not bruise/bleed easily.   Skin: Negative for dry skin, itching and rash.   Musculoskeletal: Negative for falls. Positive for left knee and low back pain and  muscle weakness.     PAST MEDICAL HISTORY:   Past Medical History:   Diagnosis Date    History of cold sores     Prostate cancer 2013    Sacroiliitis 4/7/2021    Squamous cell carcinoma 9/23/2019       PAST  SURGICAL HISTORY:   Past Surgical History:   Procedure Laterality Date    ANTERIOR CRUCIATE LIGAMENT REPAIR      APPENDECTOMY      FINGER AMPUTATION Right 9/23/2019    Procedure: AMPUTATION, FINGER;  Surgeon: Luna Tirado MD;  Location: [facility];  Service: Orthopedics;  Laterality: Right;  regional MAC    FINGER MASS EXCISION Right 9/9/2019    Procedure: EXCISION, MASS, FINGER small finger resection;  Surgeon: Luna Tirado MD;  Location: [facility] OR;  Service: Orthopedics;  Laterality: Right;  regional MAC    INJECTION OF JOINT Right 7/22/2020    Procedure: INJECTION RIGHT SI JOINT AND RIGHT PIRIFORMIS INJECTION;  Surgeon: Roosevelt Reddy MD;  Location: [facility];  Service: Pain Management;  Laterality: Right;  NEEDS CONSENT, URGENT    INJECTION OF JOINT Right 4/7/2021    Procedure: INJECTION, JOINT, SI;  Surgeon: Roosevelt Reddy MD;  Location: [facility];  Service: Pain Management;  Laterality: Right;    PROSTATECTOMY      radical    REMOVAL OF NAIL OF DIGIT Right 8/19/2019    Procedure: REMOVAL, NAIL, DIGIT right small finger;  Surgeon: Luna Tirado MD;  Location: [facility];  Service: Orthopedics;  Laterality: Right;       FAMILY HISTORY:   Family History   Problem Relation Age of Onset    No Known Problems Mother     Prostate cancer Father     Prostate cancer Maternal Uncle     Melanoma Neg Hx     Colon cancer Neg Hx        SOCIAL HISTORY:   Social History     Socioeconomic History    Marital status:    Occupational History    Occupation: anesthesiologist   Tobacco Use    Smoking status: Never Smoker    Smokeless tobacco: Never Used   Substance and Sexual Activity    Alcohol use: Yes     Comment: social    Drug use: No    Sexual activity: Yes     Partners: Female   Social History Narrative    Moved from [place] 3 weeks ago. Lives with wife.       MEDICATIONS:     Current Outpatient Medications:     albuterol (PROVENTIL/VENTOLIN HFA) 90 mcg/actuation  "inhaler, Inhale 1-2 puffs into the lungs every 6 (six) hours as needed for Wheezing. Rescue (Patient not taking: Reported on 12/29/2021), Disp: 18 g, Rfl: 2    valACYclovir (VALTREX) 1000 MG tablet, , Disp: , Rfl:   No current facility-administered medications for this visit.    Facility-Administered Medications Ordered in Other Visits:     0.9%  NaCl infusion, , Intravenous, Continuous, Anthony Juan MD    ALLERGIES:   Review of patient's allergies indicates:  Not on File     PHYSICAL EXAMINATION:  /78 (BP Location: Left arm, Patient Position: Sitting, BP Method: Large (Automatic))   Pulse 75   Ht 5' 10" (1.778 m)   Wt 90.7 kg (200 lb)   BMI 28.70 kg/m²   General: Well-developed well-nourished 66 y.o. malein no acute distress   Cardiovascular: Regular rhythm by palpation of distal pulse, normal color and temperature, no concerning varicosities on symptomatic side   Lungs: No labored breathing or wheezing appreciated   Neuro: Alert and oriented ×3   Psychiatric: well oriented to person, place and time, demonstrates normal mood and affect   Skin: No rashes, lesions or ulcers, normal temperature, turgor, and texture on involved extremity    Ortho/SPM Exam  Exam of the left knee shows a curvilinear medially based incision indicative of prior surgery.  5-10 degree loss of terminal passive extension with significant pain on forced extension.  Significant pain with forced flexion.  Flexion to 130°.  Mildly positive patellar crepitus.  Significant pain over the medial joint line with palpation.  Positive Fausto's maneuver for typical pain.  There is guarding present.  1A Lachman.  Guards with pivot shift testing.  Stable to varus and valgus stress.  Trace effusion.    Positive straight leg raise on the right side.  No midline lumbar spine tenderness.  Right-sided low back tenderness to palpation with significant pain provoked on left and right lateral bend.  Some stiffness to forward bend.  Pain over the " right SI joint.  Motor and sensory otherwise intact to the right lower extremity.    Standing pelvic tilt with right side higher than left.    IMAGING:    X-rays including standing, weight bearing AP and flexion bilateral knees, LEFT knee lateral and sunrise views ordered and images reviewed by me show:    Previous ACL reconstruction with vertical femoral tunnel.  Well-maintained joint spaces but degenerative changes present.  Chondrocalcinosis.    Pelvis AP x-ray shows pubic symphysis and bilateral SI joint degenerative changes.  Well-maintained hip joint spaces.    Lumbar spine x-rays demonstrate retrolisthesis of L5 on L4 which appears stable compared to films from 2020. Degenerative disc disease at L4-5 and L5-S1.      ASSESSMENT:      ICD-10-CM ICD-9-CM   1. Chronic right-sided low back pain with right-sided sciatica  M54.41 724.2    G89.29 724.3     338.29   2. Sacroiliitis  M46.1 720.2   3. Chronic pain of left knee  M25.562 719.46    G89.29 338.29   4. Arthropathy, chondrocalcinosis, pyrophosphate, knee, left  M11.262 275.49     712.26       PLAN:     The patient called in to work today due to pain and functional limitation to the above-stated complaints.  He has a longstanding history of low back pain with intermittent right sciatica and SI joint related symptoms.  Prior conservative treatment has been extensive.  No cauda equina symptoms by history.  MRI of the lumbar spine is indicated for further assessment at this time.  Include expanded view of the bilateral SI joints as well.  In addition he has fairly significant left knee pain with signs of degeneration and chondrocalcinosis.  He reports prominent mechanical symptoms and guards significantly on exam.  Possible unstable meniscus pathology.  MRI of the left knee is indicated for further assessment given the extent duration of his complaints despite prior conservative treatment.  We will get both studies done as soon as possible and I will follow up  with him afterwards to discuss results and treatment options.  3D knee sleeve given for the left knee to replace his over-the-counter sleeve.    Procedures

## 2022-08-02 ENCOUNTER — OFFICE VISIT (OUTPATIENT)
Dept: SPORTS MEDICINE | Facility: CLINIC | Age: 67
End: 2022-08-02
Payer: COMMERCIAL

## 2022-08-02 VITALS
DIASTOLIC BLOOD PRESSURE: 79 MMHG | HEIGHT: 70 IN | SYSTOLIC BLOOD PRESSURE: 126 MMHG | BODY MASS INDEX: 28.79 KG/M2 | WEIGHT: 201.13 LBS | HEART RATE: 61 BPM

## 2022-08-02 DIAGNOSIS — M23.232 DERANG OF MEDIAL MENISCUS DUE TO OLD TEAR/INJ, LEFT KNEE: Primary | ICD-10-CM

## 2022-08-02 DIAGNOSIS — G89.29 CHRONIC PAIN OF LEFT KNEE: ICD-10-CM

## 2022-08-02 DIAGNOSIS — M25.562 CHRONIC PAIN OF LEFT KNEE: ICD-10-CM

## 2022-08-02 DIAGNOSIS — M94.262 CHONDROMALACIA OF LEFT KNEE: ICD-10-CM

## 2022-08-02 PROCEDURE — 3008F PR BODY MASS INDEX (BMI) DOCUMENTED: ICD-10-PCS | Mod: CPTII,S$GLB,, | Performed by: ORTHOPAEDIC SURGERY

## 2022-08-02 PROCEDURE — 99499 UNLISTED E&M SERVICE: CPT | Mod: S$GLB,,, | Performed by: ORTHOPAEDIC SURGERY

## 2022-08-02 PROCEDURE — 99999 PR PBB SHADOW E&M-EST. PATIENT-LVL III: ICD-10-PCS | Mod: PBBFAC,,, | Performed by: ORTHOPAEDIC SURGERY

## 2022-08-02 PROCEDURE — 3074F PR MOST RECENT SYSTOLIC BLOOD PRESSURE < 130 MM HG: ICD-10-PCS | Mod: CPTII,S$GLB,, | Performed by: ORTHOPAEDIC SURGERY

## 2022-08-02 PROCEDURE — 3074F SYST BP LT 130 MM HG: CPT | Mod: CPTII,S$GLB,, | Performed by: ORTHOPAEDIC SURGERY

## 2022-08-02 PROCEDURE — 3008F BODY MASS INDEX DOCD: CPT | Mod: CPTII,S$GLB,, | Performed by: ORTHOPAEDIC SURGERY

## 2022-08-02 PROCEDURE — 20610 DRAIN/INJ JOINT/BURSA W/O US: CPT | Mod: LT,S$GLB,, | Performed by: ORTHOPAEDIC SURGERY

## 2022-08-02 PROCEDURE — 3078F PR MOST RECENT DIASTOLIC BLOOD PRESSURE < 80 MM HG: ICD-10-PCS | Mod: CPTII,S$GLB,, | Performed by: ORTHOPAEDIC SURGERY

## 2022-08-02 PROCEDURE — 99499 NO LOS: ICD-10-PCS | Mod: S$GLB,,, | Performed by: ORTHOPAEDIC SURGERY

## 2022-08-02 PROCEDURE — 1159F PR MEDICATION LIST DOCUMENTED IN MEDICAL RECORD: ICD-10-PCS | Mod: CPTII,S$GLB,, | Performed by: ORTHOPAEDIC SURGERY

## 2022-08-02 PROCEDURE — 1159F MED LIST DOCD IN RCRD: CPT | Mod: CPTII,S$GLB,, | Performed by: ORTHOPAEDIC SURGERY

## 2022-08-02 PROCEDURE — 3078F DIAST BP <80 MM HG: CPT | Mod: CPTII,S$GLB,, | Performed by: ORTHOPAEDIC SURGERY

## 2022-08-02 PROCEDURE — 99999 PR PBB SHADOW E&M-EST. PATIENT-LVL III: CPT | Mod: PBBFAC,,, | Performed by: ORTHOPAEDIC SURGERY

## 2022-08-02 PROCEDURE — 20610 LARGE JOINT ASPIRATION/INJECTION: L KNEE: ICD-10-PCS | Mod: LT,S$GLB,, | Performed by: ORTHOPAEDIC SURGERY

## 2022-08-02 RX ORDER — CELECOXIB 200 MG/1
200 CAPSULE ORAL DAILY
Qty: 30 CAPSULE | Refills: 2 | Status: SHIPPED | OUTPATIENT
Start: 2022-08-02 | End: 2022-08-02 | Stop reason: SDUPTHER

## 2022-08-02 RX ORDER — CELECOXIB 200 MG/1
200 CAPSULE ORAL DAILY
Qty: 30 CAPSULE | Refills: 2 | Status: SHIPPED | OUTPATIENT
Start: 2022-08-02 | End: 2022-08-02

## 2022-08-02 RX ADMIN — TRIAMCINOLONE ACETONIDE 40 MG: 40 INJECTION, SUSPENSION INTRA-ARTICULAR; INTRAMUSCULAR at 02:08

## 2022-08-02 NOTE — PROGRESS NOTES
CC: Left knee pain    Patient returns to clinic for CSI of left knee.  Discussed MRI results over the phone.  He has met with one of our spine specialists Dr. Alexandre Garcia who recommended L4-5 level transforaminal epidural steroid injections.    MRI left knee:  1. Postsurgical changes of prior ACL reconstruction.  No evidence for complication.  2. Truncation of the medial meniscus, likely sequela of prior meniscectomy.  Complex T2 hyperintense signal within the posterior horn, possible retear.  Minimal free edge irregularity of the lateral meniscus.  3. Partial-thickness chondral fissuring over the patella and medial femoral condyle.    MRI L-spine/SI joints:  Multilevel degenerative changes of the lumbar spine detailed above.  Moderate spinal canal stenosis noted at L4-L5.  Moderate left neural foraminal narrowing noted at L5-S1.    ASSESSMENT:      ICD-10-CM ICD-9-CM   1. Derang of medial meniscus due to old tear/inj, left knee  M23.232 717.3   2. Chondromalacia of left knee  M94.262 717.7   3. Chronic pain of left knee  M25.562 719.46    G89.29 338.29     PLAN:     - Imaging reviewed with the patient.  Recommended corticosteroid injection for now.  We will see how he responds.  Discussed low-impact cardio exercise and quad strengthening.  Candidate for knee arthroscopy and partial medial meniscectomy with chondroplasty if symptoms do not respond appropriately.    - CSI left knee    - Celebrex prescribed     - All questions answered      Large Joint Aspiration/Injection: L knee    Date/Time: 8/2/2022 2:00 PM  Performed by: LAUREN Chambers MD  Authorized by: LAUREN Chambers MD     Consent Done?:  Yes (Verbal)  Indications:  Pain  Site marked: the procedure site was marked    Timeout: prior to procedure the correct patient, procedure, and site was verified    Prep: patient was prepped and draped in usual sterile fashion      Local anesthesia used?: Yes    Local anesthetic:  Co-phenylcaine spray (0.2%  Naropin)  Anesthetic total (ml):  4      Details:  Needle Size:  22 G  Ultrasonic Guidance for needle placement?: No    Approach:  Lateral  Location:  Knee  Site:  L knee  Medications:  40 mg triamcinolone acetonide 40 mg/mL  Patient tolerance:  Patient tolerated the procedure well with no immediate complications

## 2022-08-03 RX ORDER — TRIAMCINOLONE ACETONIDE 40 MG/ML
40 INJECTION, SUSPENSION INTRA-ARTICULAR; INTRAMUSCULAR
Status: DISCONTINUED | OUTPATIENT
Start: 2022-08-02 | End: 2022-08-03 | Stop reason: HOSPADM

## 2022-08-03 RX ORDER — CELECOXIB 200 MG/1
200 CAPSULE ORAL DAILY
Qty: 30 CAPSULE | Refills: 2 | Status: SHIPPED | OUTPATIENT
Start: 2022-08-03 | End: 2022-10-02

## 2022-08-04 ENCOUNTER — TELEPHONE (OUTPATIENT)
Dept: PAIN MEDICINE | Facility: CLINIC | Age: 67
End: 2022-08-04
Payer: COMMERCIAL

## 2022-08-12 ENCOUNTER — TELEPHONE (OUTPATIENT)
Dept: SPORTS MEDICINE | Facility: CLINIC | Age: 67
End: 2022-08-12
Payer: COMMERCIAL

## 2022-08-12 DIAGNOSIS — M54.50 LOWER BACK PAIN: Primary | ICD-10-CM

## 2022-08-23 ENCOUNTER — PATIENT MESSAGE (OUTPATIENT)
Dept: SPORTS MEDICINE | Facility: CLINIC | Age: 67
End: 2022-08-23
Payer: COMMERCIAL

## 2022-10-20 ENCOUNTER — OFFICE VISIT (OUTPATIENT)
Dept: DERMATOLOGY | Facility: CLINIC | Age: 67
End: 2022-10-20
Payer: COMMERCIAL

## 2022-10-20 DIAGNOSIS — Z85.828 HISTORY OF SKIN CANCER: ICD-10-CM

## 2022-10-20 DIAGNOSIS — L82.1 SEBORRHEIC KERATOSES: ICD-10-CM

## 2022-10-20 DIAGNOSIS — D48.5 NEOPLASM OF UNCERTAIN BEHAVIOR OF SKIN: Primary | ICD-10-CM

## 2022-10-20 DIAGNOSIS — Z12.83 SCREENING EXAM FOR SKIN CANCER: ICD-10-CM

## 2022-10-20 DIAGNOSIS — L57.0 ACTINIC KERATOSIS: ICD-10-CM

## 2022-10-20 PROCEDURE — 99213 OFFICE O/P EST LOW 20 MIN: CPT | Mod: 25,S$GLB,, | Performed by: STUDENT IN AN ORGANIZED HEALTH CARE EDUCATION/TRAINING PROGRAM

## 2022-10-20 PROCEDURE — 1101F PR PT FALLS ASSESS DOC 0-1 FALLS W/OUT INJ PAST YR: ICD-10-PCS | Mod: CPTII,S$GLB,, | Performed by: STUDENT IN AN ORGANIZED HEALTH CARE EDUCATION/TRAINING PROGRAM

## 2022-10-20 PROCEDURE — 88305 TISSUE EXAM BY PATHOLOGIST: CPT | Performed by: DERMATOLOGY

## 2022-10-20 PROCEDURE — 17000 DESTRUCT PREMALG LESION: CPT | Mod: 59,S$GLB,, | Performed by: STUDENT IN AN ORGANIZED HEALTH CARE EDUCATION/TRAINING PROGRAM

## 2022-10-20 PROCEDURE — 17003 DESTRUCTION, PREMALIGNANT LESIONS; SECOND THROUGH 14 LESIONS: ICD-10-PCS | Mod: 59,S$GLB,, | Performed by: STUDENT IN AN ORGANIZED HEALTH CARE EDUCATION/TRAINING PROGRAM

## 2022-10-20 PROCEDURE — 1159F MED LIST DOCD IN RCRD: CPT | Mod: CPTII,S$GLB,, | Performed by: STUDENT IN AN ORGANIZED HEALTH CARE EDUCATION/TRAINING PROGRAM

## 2022-10-20 PROCEDURE — 1101F PT FALLS ASSESS-DOCD LE1/YR: CPT | Mod: CPTII,S$GLB,, | Performed by: STUDENT IN AN ORGANIZED HEALTH CARE EDUCATION/TRAINING PROGRAM

## 2022-10-20 PROCEDURE — 88305 TISSUE EXAM BY PATHOLOGIST: ICD-10-PCS | Mod: 26,,, | Performed by: DERMATOLOGY

## 2022-10-20 PROCEDURE — 3288F FALL RISK ASSESSMENT DOCD: CPT | Mod: CPTII,S$GLB,, | Performed by: STUDENT IN AN ORGANIZED HEALTH CARE EDUCATION/TRAINING PROGRAM

## 2022-10-20 PROCEDURE — 11102 PR TANGENTIAL BIOPSY, SKIN, SINGLE LESION: ICD-10-PCS | Mod: S$GLB,,, | Performed by: STUDENT IN AN ORGANIZED HEALTH CARE EDUCATION/TRAINING PROGRAM

## 2022-10-20 PROCEDURE — 17000 PR DESTRUCTION(LASER SURGERY,CRYOSURGERY,CHEMOSURGERY),PREMALIGNANT LESIONS,FIRST LESION: ICD-10-PCS | Mod: 59,S$GLB,, | Performed by: STUDENT IN AN ORGANIZED HEALTH CARE EDUCATION/TRAINING PROGRAM

## 2022-10-20 PROCEDURE — 99213 PR OFFICE/OUTPT VISIT, EST, LEVL III, 20-29 MIN: ICD-10-PCS | Mod: 25,S$GLB,, | Performed by: STUDENT IN AN ORGANIZED HEALTH CARE EDUCATION/TRAINING PROGRAM

## 2022-10-20 PROCEDURE — 88305 TISSUE EXAM BY PATHOLOGIST: CPT | Mod: 26,,, | Performed by: DERMATOLOGY

## 2022-10-20 PROCEDURE — 99999 PR PBB SHADOW E&M-EST. PATIENT-LVL III: CPT | Mod: PBBFAC,,, | Performed by: STUDENT IN AN ORGANIZED HEALTH CARE EDUCATION/TRAINING PROGRAM

## 2022-10-20 PROCEDURE — 1160F RVW MEDS BY RX/DR IN RCRD: CPT | Mod: CPTII,S$GLB,, | Performed by: STUDENT IN AN ORGANIZED HEALTH CARE EDUCATION/TRAINING PROGRAM

## 2022-10-20 PROCEDURE — 1159F PR MEDICATION LIST DOCUMENTED IN MEDICAL RECORD: ICD-10-PCS | Mod: CPTII,S$GLB,, | Performed by: STUDENT IN AN ORGANIZED HEALTH CARE EDUCATION/TRAINING PROGRAM

## 2022-10-20 PROCEDURE — 11102 TANGNTL BX SKIN SINGLE LES: CPT | Mod: S$GLB,,, | Performed by: STUDENT IN AN ORGANIZED HEALTH CARE EDUCATION/TRAINING PROGRAM

## 2022-10-20 PROCEDURE — 17003 DESTRUCT PREMALG LES 2-14: CPT | Mod: 59,S$GLB,, | Performed by: STUDENT IN AN ORGANIZED HEALTH CARE EDUCATION/TRAINING PROGRAM

## 2022-10-20 PROCEDURE — 3288F PR FALLS RISK ASSESSMENT DOCUMENTED: ICD-10-PCS | Mod: CPTII,S$GLB,, | Performed by: STUDENT IN AN ORGANIZED HEALTH CARE EDUCATION/TRAINING PROGRAM

## 2022-10-20 PROCEDURE — 1160F PR REVIEW ALL MEDS BY PRESCRIBER/CLIN PHARMACIST DOCUMENTED: ICD-10-PCS | Mod: CPTII,S$GLB,, | Performed by: STUDENT IN AN ORGANIZED HEALTH CARE EDUCATION/TRAINING PROGRAM

## 2022-10-20 PROCEDURE — 99999 PR PBB SHADOW E&M-EST. PATIENT-LVL III: ICD-10-PCS | Mod: PBBFAC,,, | Performed by: STUDENT IN AN ORGANIZED HEALTH CARE EDUCATION/TRAINING PROGRAM

## 2022-10-20 NOTE — PATIENT INSTRUCTIONS
Shave Biopsy Wound Care    Your doctor has performed a shave biopsy today.  A band aid and vaseline ointment has been placed over the site.  This should remain in place for NO LONGER THAN 48 hours.  It is fine to remove the bandaid after 24 hours, if the area is no longer bleeding. It is recommended that you keep the area dry (do not wet)) for the first 24 hours.  After 24 hours, wash the area with warm soap and water and apply Vaseline jelly.  Many patients prefer to use Neosporin or Bacitracin ointment.  This is acceptable; however, know that you can develop an allergy to this medication even if you have used it safely for years.  It is important to keep the area moist.  Letting it dry out and get air slows healing time, and will worsen the scar.        If you notice increasing redness, tenderness, pain, or yellow drainage at the biopsy site, please notify your doctor.  These are signs of an infection.    If your biopsy site is bleeding, apply firm pressure for 15 minutes straight.  Repeat for another 15 minutes, if it is still bleeding.   If the surgical site continues to bleed, then please contact your doctor.      For [facility] users:   You will receive your biopsy results in [facility] as soon as they are available. Please be assured that your physician/provider will review your results and will then determine what further treatment, evaluation, or planning is required. You should be contacted by your physician's/provider's office within 5 business days of receiving your results; If not, please reach out to directly. This is one more way [employer] is putting you first.     [facility] [place] [address]/ [phone] FAX/ [email or web address]           CRYOSURGERY      Your doctor has used a method called cryosurgery to treat your skin condition. Cryosurgery refers to the use of very cold substances to treat a variety of skin conditions such as warts, pre-skin cancers, molluscum  contagiosum, sun spots, and several benign growths. The substance we use in cryosurgery is liquid nitrogen and is so cold (-195 degrees Celsius) that is burns when administered.     Following treatment in the office, the skin may immediately burn and become red. You may find the area around the lesion is affected as well. It is sometimes necessary to treat not only the lesion, but a small area of the surrounding normal skin to achieve a good response.     A blister, and even a blood filled blister, may form after treatment.   This is a normal response. If the blister is painful, it is acceptable to sterilize a needle and with rubbing alcohol and gently pop the blister. It is important that you gently wash the area with soap and warm water as the blister fluid may contain wart virus if a wart was treated. Do no remove the roof of the blister.     The area treated can take anywhere from 1-3 weeks to heal. Healing time depends on the kind skin lesion treated, the location, and how aggressively the lesion was treated. It is recommended that the areas treated are covered with Vaseline or bacitracin ointment and a band-aid. If a band-aid is not practical, just ointment applied several times per day will do. Keeping these areas moist will speed the healing time.    Treatment with liquid nitrogen can leave a scar. In dark skin, it may be a light or dark scar, in light skin it may be a white or pink scar. These will generally fade with time, but may never go away completely.     If you have any concerns after your treatment, please feel free to call the office.       [facility] [place] [address]/ [phone] FAX/ [email or web address]

## 2022-10-20 NOTE — PROGRESS NOTES
"  Subjective:       Patient ID:  Juan Luis Mcgrath is a 67 y.o. male who presents for   Chief Complaint   Patient presents with    Skin Check     UBSE    Intertrigo    Lesion     Chest       Patient is here today for a "mole" check.   Pt has a history of  moderate sun exposure in the past.   Pt recalls several blistering sunburns in the past- no  Pt has history of tanning bed use- no  Pt has  had moles removed in the past- yes SCC finger 2019  Pt has history of melanoma in first degree relatives-  none    Patient with new complaint of lesion(s)  Location: chest  Duration: 6 months  Symptoms: growing  Relieving factors/Previous treatments: none        Lesion    Review of Systems   Skin:  Positive for activity-related sunscreen use and wears hat. Negative for daily sunscreen use and recent sunburn.   Hematologic/Lymphatic: Does not bruise/bleed easily.      Objective:    Physical Exam   Constitutional: He appears well-developed and well-nourished. No distress.   Neurological: He is alert and oriented to person, place, and time. He is not disoriented.   Psychiatric: He has a normal mood and affect.   Skin:   Areas Examined (abnormalities noted in diagram):   Scalp / Hair Palpated and Inspected  Head / Face Inspection Performed  Neck Inspection Performed  Chest / Axilla Inspection Performed  Abdomen Inspection Performed  Back Inspection Performed  RUE Inspected  LUE Inspection Performed                 Diagram Legend     Erythematous scaling macule/papule c/w actinic keratosis       Vascular papule c/w angioma      Pigmented verrucoid papule/plaque c/w seborrheic keratosis      Yellow umbilicated papule c/w sebaceous hyperplasia      Irregularly shaped tan macule c/w lentigo     1-2 mm smooth white papules consistent with Milia      Movable subcutaneous cyst with punctum c/w epidermal inclusion cyst      Subcutaneous movable cyst c/w pilar cyst      Firm pink to brown papule c/w dermatofibroma      Pedunculated " fleshy papule(s) c/w skin tag(s)      Evenly pigmented macule c/w junctional nevus     Mildly variegated pigmented, slightly irregular-bordered macule c/w mildly atypical nevus      Flesh colored to evenly pigmented papule c/w intradermal nevus       Pink pearly papule/plaque c/w basal cell carcinoma      Erythematous hyperkeratotic cursted plaque c/w SCC      Surgical scar with no sign of skin cancer recurrence      Open and closed comedones      Inflammatory papules and pustules      Verrucoid papule consistent consistent with wart     Erythematous eczematous patches and plaques     Dystrophic onycholytic nail with subungual debris c/w onychomycosis     Umbilicated papule    Erythematous-base heme-crusted tan verrucoid plaque consistent with inflamed seborrheic keratosis     Erythematous Silvery Scaling Plaque c/w Psoriasis     See annotation            Assessment / Plan:      Pathology Orders:       Normal Orders This Visit    Specimen to Pathology, Dermatology     Questions:    Procedure Type: Dermatology and skin neoplasms    Number of Specimens: 1    ------------------------: -------------------------    Spec 1 Procedure: Biopsy    Spec 1 Clinical Impression: AK vs SCCis    Spec 1 Source: mid chest    Release to patient: Immediate          Neoplasm of uncertain behavior of skin  -     Specimen to Pathology, Dermatology  Shave biopsy procedure note:    Shave biopsy performed after verbal consent including risk of infection, scar, recurrence, need for additional treatment of site. Area prepped with alcohol, anesthetized with approximately 1.0cc of 1% lidocaine with epinephrine. Lesional tissue shaved with razor blade. Hemostasis achieved with application of aluminum chloride followed by hyfrecation. No complications. Dressing applied. Wound care explained.      Actinic keratosis  Cryosurgery Procedure Note    Verbal consent from the patient is obtained including, but not limited to, risk of  hypopigmentation/hyperpigmentation, scar, recurrence of lesion. The patient is aware of the precancerous quality and need for treatment of these lesions. Liquid nitrogen cryosurgery is applied to the 4 actinic keratoses, as detailed in the physical exam, to produce a freeze injury. The patient is aware that blisters may form and is instructed on wound care with gentle cleansing and use of vaseline ointment to keep moist until healed. The patient is supplied a handout on cryosurgery and is instructed to call if lesions do not completely resolve.    Screening exam for skin cancer and History of SCC  Area(s) of previous NMSC evaluated with no signs of recurrence.    Upper body skin examination performed today including at least 6 points as noted in physical examination. Suspicious lesions noted.    Recommend daily sun protection/avoidance and use of at least SPF 30, broad spectrum sunscreen (OTC drug).     Seborrheic keratoses  Reassurance given to patient. No treatment is necessary.   Treatment of benign, asymptomatic lesions may be considered cosmetic.           Follow up in about 6 months (around 4/20/2023).

## 2022-11-01 LAB
FINAL PATHOLOGIC DIAGNOSIS: NORMAL
GROSS: NORMAL
Lab: NORMAL
MICROSCOPIC EXAM: NORMAL

## 2022-11-09 ENCOUNTER — TELEPHONE (OUTPATIENT)
Dept: DERMATOLOGY | Facility: CLINIC | Age: 67
End: 2022-11-09
Payer: COMMERCIAL

## 2022-11-09 NOTE — TELEPHONE ENCOUNTER
----- Message from Jessica Bryant RN sent at 11/4/2022  4:47 PM CDT -----  Regarding: FW: Returning call/    ----- Message -----  From: Gwen Zamora MA  Sent: 11/4/2022   3:51 PM CDT  To: Agatha Hodge LPN, Sera Patterson Staff  Subject: FW: Returning call/                                ----- Message -----  From: Kaylan Blanco  Sent: 11/4/2022   3:41 PM CDT  To: Sander KEATING Staff  Subject: Returning call/                                  Pt is requesting a call back from [place] Please call to discuss further.    Pt@ [phone]

## 2022-11-09 NOTE — TELEPHONE ENCOUNTER
----- Message from Luis Lerma MD sent at 11/1/2022  4:11 PM CDT -----  Skin,  mid chest, shave biopsy:   - BOWEN'S DISEASE (SQUAMOUS CELL CARCINOMA IN-SITU), AT LEAST, WITH   FOLLICULAR INVOLVEMENT, EXTENDING TO THE DEEP AND A PERIPHERAL MARGIN, see   comment     Please assist with scheduling for excision

## 2022-11-11 ENCOUNTER — PATIENT MESSAGE (OUTPATIENT)
Dept: DERMATOLOGY | Facility: CLINIC | Age: 67
End: 2022-11-11
Payer: COMMERCIAL

## 2022-12-19 ENCOUNTER — PATIENT MESSAGE (OUTPATIENT)
Dept: DERMATOLOGY | Facility: CLINIC | Age: 67
End: 2022-12-19
Payer: COMMERCIAL

## 2022-12-29 ENCOUNTER — PROCEDURE VISIT (OUTPATIENT)
Dept: DERMATOLOGY | Facility: CLINIC | Age: 67
End: 2022-12-29
Payer: COMMERCIAL

## 2022-12-29 DIAGNOSIS — D04.9 SQUAMOUS CELL CARCINOMA IN SITU (SCCIS) OF SKIN: Primary | ICD-10-CM

## 2022-12-29 PROCEDURE — 88305 TISSUE EXAM BY PATHOLOGIST: CPT | Performed by: PATHOLOGY

## 2022-12-29 PROCEDURE — 12032 INTMD RPR S/A/T/EXT 2.6-7.5: CPT | Mod: 51,S$GLB,, | Performed by: STUDENT IN AN ORGANIZED HEALTH CARE EDUCATION/TRAINING PROGRAM

## 2022-12-29 PROCEDURE — 11602 EXC TR-EXT MAL+MARG 1.1-2 CM: CPT | Mod: S$GLB,,, | Performed by: STUDENT IN AN ORGANIZED HEALTH CARE EDUCATION/TRAINING PROGRAM

## 2022-12-29 PROCEDURE — 12032 PR LAYR CLOS WND TRUNK,ARM,LEG 2.6-7.5 CM: ICD-10-PCS | Mod: 51,S$GLB,, | Performed by: STUDENT IN AN ORGANIZED HEALTH CARE EDUCATION/TRAINING PROGRAM

## 2022-12-29 PROCEDURE — 88305 TISSUE EXAM BY PATHOLOGIST: ICD-10-PCS | Mod: 26,,, | Performed by: PATHOLOGY

## 2022-12-29 PROCEDURE — 99499 UNLISTED E&M SERVICE: CPT | Mod: S$GLB,,, | Performed by: STUDENT IN AN ORGANIZED HEALTH CARE EDUCATION/TRAINING PROGRAM

## 2022-12-29 PROCEDURE — 88305 TISSUE EXAM BY PATHOLOGIST: CPT | Mod: 26,,, | Performed by: PATHOLOGY

## 2022-12-29 PROCEDURE — 99499 NO LOS: ICD-10-PCS | Mod: S$GLB,,, | Performed by: STUDENT IN AN ORGANIZED HEALTH CARE EDUCATION/TRAINING PROGRAM

## 2022-12-29 PROCEDURE — 11602 PR EXC SKIN MALIG 1.1-2 CM TRUNK,ARM,LEG: ICD-10-PCS | Mod: S$GLB,,, | Performed by: STUDENT IN AN ORGANIZED HEALTH CARE EDUCATION/TRAINING PROGRAM

## 2022-12-29 NOTE — PATIENT INSTRUCTIONS

## 2022-12-29 NOTE — PROGRESS NOTES
PROCEDURE: Elliptical excision with intermediate layered repair in order to decrease dead space and decrease tension.    ANESTHETIC: 10 cc 1% Xylocaine with Epinephrine 1:100,000, buffered    SURGEON: Luis Lerma M.D.    ASSISTANTS: Daxa Dutta MA    PREOPERATIVE DIAGNOSIS:  Squamous Cell Carcinoma    POSTOPERATIVE DIAGNOSIS:  Same as preoperative diagnosis    PATHOLOGIC DIAGNOSIS: Pending    LOCATION: mid chest    INITIAL LESION SIZE: 0.8 cm    EXCISED DIAMETER: 1.8 cm    PREPARATION: The diagnosis, procedure, alternatives, benefits and risks, including but not limited to: infection, bleeding/bruising, drug reactions, pain, scar or cosmetic defect, local sensation disturbances, wound dehiscence (separation of wound edges after sutures removed) and/or recurrence of present condition were explained to the patient. The patient elected to proceed.  Patient's identity was verified using 2 patient identifiers and the side and site was verified.  Time out period with surgeon, assistant and patient in surgical suite was taken.    PROCEDURE: The location noted above was prepped, draped, and anesthetized in the usual sterile fashion per Daxa Dutta MA. Lesional tissue was carefully marked with at least 5 mm margins of clinically normal skin in all directions. A fusiform elliptical excision was done with #15 blade carried down completely through the dermis into the deep subcutaneous tissues to the level of the non-muscle fascia, and dissection was carried out in that plane.  Electrocoagulation was used to obtain hemostasis. Blood loss was minimal. The wound was then approximated in a layered fashion with subcutaneous and intradermal sutures of 4.0 Monocryl, approximately 5 in number, and the wound was then superficially closed with simple interrupted sutures of 4.0 Prolene.    The patient tolerated the procedure well.    The area was cleaned and dressed appropriately and the patient was given wound care instructions, as  well as an appointment for follow-up evaluation.    LENGTH OF REPAIR: 5 cm

## 2023-01-09 LAB
FINAL PATHOLOGIC DIAGNOSIS: NORMAL
Lab: NORMAL

## 2023-01-26 ENCOUNTER — HOSPITAL ENCOUNTER (OUTPATIENT)
Dept: RADIOLOGY | Facility: HOSPITAL | Age: 68
Discharge: HOME | End: 2023-01-26
Attending: ORTHOPAEDIC SURGERY
Payer: COMMERCIAL

## 2023-01-26 ENCOUNTER — OFFICE VISIT (OUTPATIENT)
Dept: SPORTS MEDICINE | Facility: CLINIC | Age: 68
End: 2023-01-26
Payer: COMMERCIAL

## 2023-01-26 VITALS
BODY MASS INDEX: 28.63 KG/M2 | HEIGHT: 70 IN | WEIGHT: 200 LBS | SYSTOLIC BLOOD PRESSURE: 130 MMHG | HEART RATE: 69 BPM | DIASTOLIC BLOOD PRESSURE: 88 MMHG

## 2023-01-26 DIAGNOSIS — M94.262 CHONDROMALACIA OF LEFT KNEE: ICD-10-CM

## 2023-01-26 DIAGNOSIS — M25.562 CHRONIC PAIN OF LEFT KNEE: ICD-10-CM

## 2023-01-26 DIAGNOSIS — S83.412A SPRAIN OF MEDIAL COLLATERAL LIGAMENT OF LEFT KNEE, INITIAL ENCOUNTER: Primary | ICD-10-CM

## 2023-01-26 DIAGNOSIS — M23.204 DEGENERATIVE TEAR OF LEFT MEDIAL MENISCUS: ICD-10-CM

## 2023-01-26 DIAGNOSIS — G89.29 CHRONIC PAIN OF LEFT KNEE: ICD-10-CM

## 2023-01-26 PROCEDURE — 1101F PT FALLS ASSESS-DOCD LE1/YR: CPT | Mod: CPTII,S$GLB,, | Performed by: ORTHOPAEDIC SURGERY

## 2023-01-26 PROCEDURE — 73562 XR KNEE ORTHO LEFT WITH FLEXION: ICD-10-PCS | Mod: 26,RT,, | Performed by: RADIOLOGY

## 2023-01-26 PROCEDURE — 1125F PR PAIN SEVERITY QUANTIFIED, PAIN PRESENT: ICD-10-PCS | Mod: CPTII,S$GLB,, | Performed by: ORTHOPAEDIC SURGERY

## 2023-01-26 PROCEDURE — 20610 LARGE JOINT ASPIRATION/INJECTION: L KNEE: ICD-10-PCS | Mod: LT,S$GLB,, | Performed by: ORTHOPAEDIC SURGERY

## 2023-01-26 PROCEDURE — 73564 X-RAY EXAM KNEE 4 OR MORE: CPT | Mod: TC,LT

## 2023-01-26 PROCEDURE — 3288F FALL RISK ASSESSMENT DOCD: CPT | Mod: CPTII,S$GLB,, | Performed by: ORTHOPAEDIC SURGERY

## 2023-01-26 PROCEDURE — 99214 PR OFFICE/OUTPT VISIT, EST, LEVL IV, 30-39 MIN: ICD-10-PCS | Mod: 25,S$GLB,, | Performed by: ORTHOPAEDIC SURGERY

## 2023-01-26 PROCEDURE — 3075F PR MOST RECENT SYSTOLIC BLOOD PRESS GE 130-139MM HG: ICD-10-PCS | Mod: CPTII,S$GLB,, | Performed by: ORTHOPAEDIC SURGERY

## 2023-01-26 PROCEDURE — 73564 XR KNEE ORTHO LEFT WITH FLEXION: ICD-10-PCS | Mod: 26,LT,, | Performed by: RADIOLOGY

## 2023-01-26 PROCEDURE — 3079F PR MOST RECENT DIASTOLIC BLOOD PRESSURE 80-89 MM HG: ICD-10-PCS | Mod: CPTII,S$GLB,, | Performed by: ORTHOPAEDIC SURGERY

## 2023-01-26 PROCEDURE — 3079F DIAST BP 80-89 MM HG: CPT | Mod: CPTII,S$GLB,, | Performed by: ORTHOPAEDIC SURGERY

## 2023-01-26 PROCEDURE — 99999 PR PBB SHADOW E&M-EST. PATIENT-LVL III: CPT | Mod: PBBFAC,,, | Performed by: ORTHOPAEDIC SURGERY

## 2023-01-26 PROCEDURE — 3075F SYST BP GE 130 - 139MM HG: CPT | Mod: CPTII,S$GLB,, | Performed by: ORTHOPAEDIC SURGERY

## 2023-01-26 PROCEDURE — 1159F PR MEDICATION LIST DOCUMENTED IN MEDICAL RECORD: ICD-10-PCS | Mod: CPTII,S$GLB,, | Performed by: ORTHOPAEDIC SURGERY

## 2023-01-26 PROCEDURE — 3288F PR FALLS RISK ASSESSMENT DOCUMENTED: ICD-10-PCS | Mod: CPTII,S$GLB,, | Performed by: ORTHOPAEDIC SURGERY

## 2023-01-26 PROCEDURE — 1159F MED LIST DOCD IN RCRD: CPT | Mod: CPTII,S$GLB,, | Performed by: ORTHOPAEDIC SURGERY

## 2023-01-26 PROCEDURE — 99214 OFFICE O/P EST MOD 30 MIN: CPT | Mod: 25,S$GLB,, | Performed by: ORTHOPAEDIC SURGERY

## 2023-01-26 PROCEDURE — 73564 X-RAY EXAM KNEE 4 OR MORE: CPT | Mod: 26,LT,, | Performed by: RADIOLOGY

## 2023-01-26 PROCEDURE — 3008F PR BODY MASS INDEX (BMI) DOCUMENTED: ICD-10-PCS | Mod: CPTII,S$GLB,, | Performed by: ORTHOPAEDIC SURGERY

## 2023-01-26 PROCEDURE — 99999 PR PBB SHADOW E&M-EST. PATIENT-LVL III: ICD-10-PCS | Mod: PBBFAC,,, | Performed by: ORTHOPAEDIC SURGERY

## 2023-01-26 PROCEDURE — 20610 DRAIN/INJ JOINT/BURSA W/O US: CPT | Mod: LT,S$GLB,, | Performed by: ORTHOPAEDIC SURGERY

## 2023-01-26 PROCEDURE — 3008F BODY MASS INDEX DOCD: CPT | Mod: CPTII,S$GLB,, | Performed by: ORTHOPAEDIC SURGERY

## 2023-01-26 PROCEDURE — 1101F PR PT FALLS ASSESS DOC 0-1 FALLS W/OUT INJ PAST YR: ICD-10-PCS | Mod: CPTII,S$GLB,, | Performed by: ORTHOPAEDIC SURGERY

## 2023-01-26 PROCEDURE — 73562 X-RAY EXAM OF KNEE 3: CPT | Mod: 26,RT,, | Performed by: RADIOLOGY

## 2023-01-26 PROCEDURE — 1125F AMNT PAIN NOTED PAIN PRSNT: CPT | Mod: CPTII,S$GLB,, | Performed by: ORTHOPAEDIC SURGERY

## 2023-01-26 RX ORDER — TRIAMCINOLONE ACETONIDE 40 MG/ML
40 INJECTION, SUSPENSION INTRA-ARTICULAR; INTRAMUSCULAR
Status: DISCONTINUED | OUTPATIENT
Start: 2023-01-26 | End: 2023-01-26 | Stop reason: HOSPADM

## 2023-01-26 RX ADMIN — TRIAMCINOLONE ACETONIDE 40 MG: 40 INJECTION, SUSPENSION INTRA-ARTICULAR; INTRAMUSCULAR at 01:01

## 2023-01-26 NOTE — PROGRESS NOTES
CC: Left knee pain    The patient returns for follow-up of his left knee.  He received a prior corticosteroid injection for pain.  He went snowboarding recently and sustained a twisting injury to the left knee which has led to some recurrent medially based pain.  He localizes over the medial joint line and the MCL.  Describes pain with prolonged standing and weight-bearing.  Twisting and turning.  Better with rest.  Bothersome on a daily basis.  Taking oral anti-inflammatory medication.    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 He presents as a new patient to me. He reports multiple significant pain locations which are significant. He reports acute on chronic left knee pain which is medially based. He describes associated intermittent prominent mechanical symptoms with locking and catching. He also has intermittent swelling. Pain is significant, alters his gait and is activity limiting. PMH is notable for prior traumatic ACL tear approximately 20 yrs ago treated with HS auto ACL recon at [facility]. He initially did well after that surgery and denies a specific discrete repeat injury event. Current pain continues >6 mos despite conservative treatment with oral meds, rest, activity mods, and therapy. Worsened during a trip to [place] over the weekend. Now he has difficulty walking. He also reports a longstanding history of low back pain, right sided buttock and proximal thigh radiculopathy and SI joint pathology. He reports prior L4-5 level MARA years ago. 2 right sided SI joint CSI with Dr. Roosevelt Reddy. Approximately 50% relief with each SI injection.  He reports that he has not had any specific spine surgery consultation in the past.  He does describe some baseline low back pain and referred symptoms which altered gait.  Prior treatment has also included therapy and oral medication.    Pain Score:   7    REVIEW OF SYSTEMS:   Constitution: Negative. Negative  for chills, fever and night sweats.    Hematologic/Lymphatic: Negative for bleeding problem. Does not bruise/bleed easily.   Skin: Negative for dry skin, itching and rash.   Musculoskeletal: Negative for falls. Positive for left knee and low back pain and  muscle weakness.     PAST MEDICAL HISTORY:   Past Medical History:   Diagnosis Date    History of cold sores     Prostate cancer 2013    Sacroiliitis 4/7/2021    Squamous cell carcinoma 9/23/2019       PAST SURGICAL HISTORY:   Past Surgical History:   Procedure Laterality Date    ANTERIOR CRUCIATE LIGAMENT REPAIR      APPENDECTOMY      FINGER AMPUTATION Right 9/23/2019    Procedure: AMPUTATION, FINGER;  Surgeon: Luna Tirado MD;  Location: [facility];  Service: Orthopedics;  Laterality: Right;  regional MAC    FINGER MASS EXCISION Right 9/9/2019    Procedure: EXCISION, MASS, FINGER small finger resection;  Surgeon: Luna Tirado MD;  Location: [facility];  Service: Orthopedics;  Laterality: Right;  regional MAC    INJECTION OF JOINT Right 7/22/2020    Procedure: INJECTION RIGHT SI JOINT AND RIGHT PIRIFORMIS INJECTION;  Surgeon: Roosevelt Reddy MD;  Location: [facility];  Service: Pain Management;  Laterality: Right;  NEEDS CONSENT, URGENT    INJECTION OF JOINT Right 4/7/2021    Procedure: INJECTION, JOINT, SI;  Surgeon: Roosevelt Reddy MD;  Location: [facility];  Service: Pain Management;  Laterality: Right;    PROSTATECTOMY      radical    REMOVAL OF NAIL OF DIGIT Right 8/19/2019    Procedure: REMOVAL, NAIL, DIGIT right small finger;  Surgeon: Luna Tirado MD;  Location: [facility];  Service: Orthopedics;  Laterality: Right;       FAMILY HISTORY:   Family History   Problem Relation Age of Onset    No Known Problems Mother     Prostate cancer Father     Prostate cancer Maternal Uncle     Melanoma Neg Hx     Colon cancer Neg Hx        SOCIAL HISTORY:   Social History     Socioeconomic History    Marital status:   "  Occupational History    Occupation: anesthesiologist   Tobacco Use    Smoking status: Never    Smokeless tobacco: Never   Substance and Sexual Activity    Alcohol use: Yes     Comment: social    Drug use: No    Sexual activity: Yes     Partners: Female   Social History Narrative    Moved from [place] 3 weeks ago. Lives with wife.       MEDICATIONS:   No current outpatient medications on file.  No current facility-administered medications for this visit.    Facility-Administered Medications Ordered in Other Visits:     0.9%  NaCl infusion, , Intravenous, Continuous, Anthony Juan MD    ALLERGIES:   Review of patient's allergies indicates:  No Known Allergies     PHYSICAL EXAMINATION:  /88   Pulse 69   Ht 5' 10" (1.778 m)   Wt 90.7 kg (200 lb)   BMI 28.70 kg/m²   General: Well-developed well-nourished 67 y.o. malein no acute distress   Cardiovascular: Regular rhythm by palpation of distal pulse, normal color and temperature, no concerning varicosities on symptomatic side   Lungs: No labored breathing or wheezing appreciated   Neuro: Alert and oriented ×3   Psychiatric: well oriented to person, place and time, demonstrates normal mood and affect   Skin: No rashes, lesions or ulcers, normal temperature, turgor, and texture on involved extremity    Ortho/SPM Exam  Exam of the left knee shows a curvilinear medially based incision indicative of prior surgery.  Trace effusion.  Pain to palpation over the MCL and medial joint line at the midsubstance and proximal MCL origin. 1+ opening to valgus stress at 30°.  No opening to valgus stress in full extension.  Pain with valgus stress.  Mild pain with Fausto's testing, medially based.  Negative Lachman.  Stable to varus stress.  Negative posterior drawer.    IMAGING:    X-rays including standing, weight bearing AP and flexion bilateral knees, LEFT knee lateral and sunrise views ordered and images reviewed by me show:    Previous ACL reconstruction with " vertical femoral tunnel.  Well-maintained joint spaces but degenerative changes present.  Chondrocalcinosis.  No interval changes of significance.\    ASSESSMENT:      ICD-10-CM ICD-9-CM   1. Sprain of medial collateral ligament of left knee, initial encounter  S83.412A 844.1   2. Degenerative tear of left medial meniscus  M23.204 717.3   3. Chondromalacia of left knee  M94.262 717.7   4. Chronic pain of left knee  M25.562 719.46    G89.29 338.29       PLAN:     Findings discussed with the patient.  New injury.  Consistent with MCL sprain, clinical low-grade 2.  Discussed the possibility of additional tearing and recurrent symptoms related to his underlying medial meniscus.  He does have a small effusion.  Discussed possible repeat MRI versus additional conservative treatment.  Repeat corticosteroid injection given today.  He also was given a short runner brace.  He will wear the brace while he is on his feet for the next few weeks.  He will let me know how he does with the injection.  If he continues to have significant pain and problems, next step would be repeat MRI.    Large Joint Aspiration/Injection: L knee    Date/Time: 1/26/2023 1:30 PM  Performed by: LAUREN Chambers MD  Authorized by: LAUREN Chambers MD     Consent Done?:  Yes (Verbal)  Indications:  Pain  Site marked: the procedure site was marked    Timeout: prior to procedure the correct patient, procedure, and site was verified    Prep: patient was prepped and draped in usual sterile fashion      Local anesthesia used?: Yes    Local anesthetic:  Co-phenylcaine spray (0.2% Naropin)  Anesthetic total (ml):  4      Details:  Needle Size:  22 G  Ultrasonic Guidance for needle placement?: No    Approach:  Lateral  Location:  Knee  Site:  L knee  Medications:  40 mg triamcinolone acetonide 40 mg/mL  Patient tolerance:  Patient tolerated the procedure well with no immediate complications

## 2023-02-27 DIAGNOSIS — Z00.00 ROUTINE GENERAL MEDICAL EXAMINATION AT A HEALTH CARE FACILITY: Primary | ICD-10-CM

## 2023-03-03 ENCOUNTER — OFFICE VISIT (OUTPATIENT)
Dept: ORTHOPEDICS | Facility: CLINIC | Age: 68
End: 2023-03-03
Payer: COMMERCIAL

## 2023-03-03 ENCOUNTER — PATIENT MESSAGE (OUTPATIENT)
Dept: PAIN MEDICINE | Facility: OTHER | Age: 68
End: 2023-03-03
Payer: COMMERCIAL

## 2023-03-03 VITALS — WEIGHT: 199.94 LBS | HEIGHT: 70 IN | BODY MASS INDEX: 28.62 KG/M2

## 2023-03-03 DIAGNOSIS — M54.17 LUMBOSACRAL RADICULOPATHY: Primary | ICD-10-CM

## 2023-03-03 DIAGNOSIS — M43.10 SPONDYLOLISTHESIS, ACQUIRED: Primary | ICD-10-CM

## 2023-03-03 DIAGNOSIS — M51.36 DDD (DEGENERATIVE DISC DISEASE), LUMBAR: ICD-10-CM

## 2023-03-03 PROCEDURE — 99999 PR PBB SHADOW E&M-EST. PATIENT-LVL II: CPT | Mod: PBBFAC,,, | Performed by: ORTHOPAEDIC SURGERY

## 2023-03-03 PROCEDURE — 1159F MED LIST DOCD IN RCRD: CPT | Mod: CPTII,S$GLB,, | Performed by: ORTHOPAEDIC SURGERY

## 2023-03-03 PROCEDURE — 3008F PR BODY MASS INDEX (BMI) DOCUMENTED: ICD-10-PCS | Mod: CPTII,S$GLB,, | Performed by: ORTHOPAEDIC SURGERY

## 2023-03-03 PROCEDURE — 3008F BODY MASS INDEX DOCD: CPT | Mod: CPTII,S$GLB,, | Performed by: ORTHOPAEDIC SURGERY

## 2023-03-03 PROCEDURE — 1125F AMNT PAIN NOTED PAIN PRSNT: CPT | Mod: CPTII,S$GLB,, | Performed by: ORTHOPAEDIC SURGERY

## 2023-03-03 PROCEDURE — 99999 PR PBB SHADOW E&M-EST. PATIENT-LVL II: ICD-10-PCS | Mod: PBBFAC,,, | Performed by: ORTHOPAEDIC SURGERY

## 2023-03-03 PROCEDURE — 1159F PR MEDICATION LIST DOCUMENTED IN MEDICAL RECORD: ICD-10-PCS | Mod: CPTII,S$GLB,, | Performed by: ORTHOPAEDIC SURGERY

## 2023-03-03 PROCEDURE — 99204 OFFICE O/P NEW MOD 45 MIN: CPT | Mod: S$GLB,,, | Performed by: ORTHOPAEDIC SURGERY

## 2023-03-03 PROCEDURE — 1125F PR PAIN SEVERITY QUANTIFIED, PAIN PRESENT: ICD-10-PCS | Mod: CPTII,S$GLB,, | Performed by: ORTHOPAEDIC SURGERY

## 2023-03-03 PROCEDURE — 99204 PR OFFICE/OUTPT VISIT, NEW, LEVL IV, 45-59 MIN: ICD-10-PCS | Mod: S$GLB,,, | Performed by: ORTHOPAEDIC SURGERY

## 2023-03-13 ENCOUNTER — LAB VISIT (OUTPATIENT)
Dept: LAB | Facility: HOSPITAL | Age: 68
End: 2023-03-13
Attending: INTERNAL MEDICINE
Payer: COMMERCIAL

## 2023-03-13 DIAGNOSIS — Z00.00 ROUTINE GENERAL MEDICAL EXAMINATION AT A HEALTH CARE FACILITY: ICD-10-CM

## 2023-03-13 LAB
ALBUMIN SERPL BCP-MCNC: 4 G/DL (ref 3.5–5.2)
ALP SERPL-CCNC: 62 U/L (ref 55–135)
ALT SERPL W/O P-5'-P-CCNC: 25 U/L (ref 10–44)
ANION GAP SERPL CALC-SCNC: 7 MMOL/L (ref 8–16)
AST SERPL-CCNC: 25 U/L (ref 10–40)
BILIRUB SERPL-MCNC: 0.8 MG/DL (ref 0.1–1)
BUN SERPL-MCNC: 12 MG/DL (ref 8–23)
CALCIUM SERPL-MCNC: 9.3 MG/DL (ref 8.7–10.5)
CHLORIDE SERPL-SCNC: 105 MMOL/L (ref 95–110)
CHOLEST SERPL-MCNC: 202 MG/DL (ref 120–199)
CHOLEST/HDLC SERPL: 3 {RATIO} (ref 2–5)
CO2 SERPL-SCNC: 27 MMOL/L (ref 23–29)
COMPLEXED PSA SERPL-MCNC: 0.03 NG/ML (ref 0–4)
CREAT SERPL-MCNC: 0.9 MG/DL (ref 0.5–1.4)
ERYTHROCYTE [DISTWIDTH] IN BLOOD BY AUTOMATED COUNT: 13.3 % (ref 11.5–14.5)
EST. GFR  (NO RACE VARIABLE): >60 ML/MIN/1.73 M^2
ESTIMATED AVG GLUCOSE: 108 MG/DL (ref 68–131)
GLUCOSE SERPL-MCNC: 94 MG/DL (ref 70–110)
HBA1C MFR BLD: 5.4 % (ref 4–5.6)
HCT VFR BLD AUTO: 47.8 % (ref 40–54)
HDLC SERPL-MCNC: 67 MG/DL (ref 40–75)
HDLC SERPL: 33.2 % (ref 20–50)
HGB BLD-MCNC: 16 G/DL (ref 14–18)
HIV 1+2 AB+HIV1 P24 AG SERPL QL IA: NORMAL
LDLC SERPL CALC-MCNC: 119.6 MG/DL (ref 63–159)
MCH RBC QN AUTO: 31.7 PG (ref 27–31)
MCHC RBC AUTO-ENTMCNC: 33.5 G/DL (ref 32–36)
MCV RBC AUTO: 95 FL (ref 82–98)
NONHDLC SERPL-MCNC: 135 MG/DL
PLATELET # BLD AUTO: 240 K/UL (ref 150–450)
PMV BLD AUTO: 9.6 FL (ref 9.2–12.9)
POTASSIUM SERPL-SCNC: 4.4 MMOL/L (ref 3.5–5.1)
PROT SERPL-MCNC: 7.1 G/DL (ref 6–8.4)
RBC # BLD AUTO: 5.04 M/UL (ref 4.6–6.2)
SODIUM SERPL-SCNC: 139 MMOL/L (ref 136–145)
TRIGL SERPL-MCNC: 77 MG/DL (ref 30–150)
TSH SERPL DL<=0.005 MIU/L-ACNC: 1.84 UIU/ML (ref 0.4–4)
WBC # BLD AUTO: 5.05 K/UL (ref 3.9–12.7)

## 2023-03-13 PROCEDURE — 84153 ASSAY OF PSA TOTAL: CPT | Performed by: INTERNAL MEDICINE

## 2023-03-13 PROCEDURE — 36415 COLL VENOUS BLD VENIPUNCTURE: CPT | Performed by: INTERNAL MEDICINE

## 2023-03-13 PROCEDURE — 87389 HIV-1 AG W/HIV-1&-2 AB AG IA: CPT | Performed by: INTERNAL MEDICINE

## 2023-03-13 PROCEDURE — 84443 ASSAY THYROID STIM HORMONE: CPT | Performed by: INTERNAL MEDICINE

## 2023-03-13 PROCEDURE — 80061 LIPID PANEL: CPT | Performed by: INTERNAL MEDICINE

## 2023-03-13 PROCEDURE — 85027 COMPLETE CBC AUTOMATED: CPT | Performed by: INTERNAL MEDICINE

## 2023-03-13 PROCEDURE — 80053 COMPREHEN METABOLIC PANEL: CPT | Performed by: INTERNAL MEDICINE

## 2023-03-13 PROCEDURE — 83036 HEMOGLOBIN GLYCOSYLATED A1C: CPT | Performed by: INTERNAL MEDICINE

## 2023-03-13 NOTE — PROGRESS NOTES
DATE: 3/13/2023  PATIENT: Juan Luis Mcgrath    Attending Physician: Alexandre Garcia M.D.    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  The patient reports that he is had back issues over the past 20 years since a weightlifting injury.  A few years ago he awoke 1 morning with severe low back pain, ended up having epidural steroid injection, and felt better.  Since then he is had various treatments including sacroiliac joint injections.  Today he reports bothersome right buttock pain especially with ambulation.  This is presenting him from running.  He reports pain on a daily basis, he has good and bad days.  He denies significant radicular symptoms, he has no family history of prior spine surgery.    The Patient denies myelopathic symptoms such as handwriting changes or difficulty with buttons/coins/keys. Denies perineal paresthesias, bowel/bladder dysfunction.    PAST MEDICAL/SURGICAL HISTORY:  Past Medical History:   Diagnosis Date    History of cold sores     Prostate cancer 2013    Sacroiliitis 4/7/2021    Squamous cell carcinoma 9/23/2019     Past Surgical History:   Procedure Laterality Date    ANTERIOR CRUCIATE LIGAMENT REPAIR      APPENDECTOMY      FINGER AMPUTATION Right 9/23/2019    Procedure: AMPUTATION, FINGER;  Surgeon: Luna Tirado MD;  Location: [facility] OR;  Service: Orthopedics;  Laterality: Right;  regional MAC    FINGER MASS EXCISION Right 9/9/2019    Procedure: EXCISION, MASS, FINGER small finger resection;  Surgeon: Luna Tirado MD;  Location: [facility] OR;  Service: Orthopedics;  Laterality: Right;  regional MAC    INJECTION OF JOINT Right 7/22/2020    Procedure: INJECTION RIGHT SI JOINT AND RIGHT PIRIFORMIS INJECTION;  Surgeon: Roosevelt Reddy MD;  Location: [facility];  Service: Pain Management;  Laterality: Right;  NEEDS CONSENT, URGENT     "INJECTION OF JOINT Right 4/7/2021    Procedure: INJECTION, JOINT, SI;  Surgeon: Roosevelt Reddy MD;  Location: [facility] [place];  Service: Pain Management;  Laterality: Right;    PROSTATECTOMY      radical    REMOVAL OF NAIL OF DIGIT Right 8/19/2019    Procedure: REMOVAL, NAIL, DIGIT right small finger;  Surgeon: Luna Tirado MD;  Location: [facility] [place] OR;  Service: Orthopedics;  Laterality: Right;       Current Medications: No current outpatient medications on file.  No current facility-administered medications for this visit.    Facility-Administered Medications Ordered in Other Visits:     0.9%  NaCl infusion, , Intravenous, Continuous, Anthony Juan MD    Social History:   Social History     Socioeconomic History    Marital status:    Occupational History    Occupation: anesthesiologist   Tobacco Use    Smoking status: Never    Smokeless tobacco: Never   Substance and Sexual Activity    Alcohol use: Yes     Comment: social    Drug use: No    Sexual activity: Yes     Partners: Female   Social History Narrative    Moved from [place] 3 weeks ago. Lives with wife.        EXAM:  Ht 5' 10" (1.778 m)   Wt 90.7 kg (199 lb 15.3 oz)   BMI 28.69 kg/m²     PHYSICAL EXAMINATION:    Gait: Normal station and gait, no difficulty with toe or heel walk.   Skin: Dorsal lumbar skin negative for rashes, lesions, hairy patches and surgical scars. There is minimal lumbar tenderness to palpation.  Range of motion: Lumbar range of motion is acceptable.  Spinal Balance: Global saggital and coronal spinal balance acceptable, no significant for scoliosis and kyphosis.  Musculoskeletal: No pain with the range of motion of the bilateral hips. No trochanteric tenderness to palpation.  Vascular: Bilateral lower extremities warm and well perfused, Dorsalis pedis pulses 2+ bilaterally.  Neurological: Normal strength and tone in all major motor groups in the bilateral lower extremities. Normal sensation to light touch in the L2-S1 " dermatomes bilaterally.  Deep tendon reflexes symmetric 1+ in the bilateral lower extremities.  Negative Babinski bilaterally. Straight leg raise negative bilaterally.    IMAGING:      Today I personally reviewed AP, Lat and Flex/Ex  upright L-spine that demonstrate a notable grade 1 L4-5 degenerative spondylolisthesis, there is L5-S1 spondylosis.  An MRI of the lumbar spine demonstrates L4-5 spondylolisthesis and associated spinal stenosis.      Body mass index is 28.69 kg/m².  Hemoglobin A1C   Date Value Ref Range Status   12/29/2021 5.2 4.0 - 5.6 % Final     Comment:     ADA Screening Guidelines:  5.7-6.4%  Consistent with prediabetes  >or=6.5%  Consistent with diabetes    High levels of fetal hemoglobin interfere with the HbA1C  assay. Heterozygous hemoglobin variants (HbS, HgC, etc)do  not significantly interfere with this assay.   However, presence of multiple variants may affect accuracy.     12/30/2019 5.4 4.0 - 5.6 % Final     Comment:     ADA Screening Guidelines:  5.7-6.4%  Consistent with prediabetes  >or=6.5%  Consistent with diabetes  High levels of fetal hemoglobin interfere with the HbA1C  assay. Heterozygous hemoglobin variants (HbS, HgC, etc)do  not significantly interfere with this assay.   However, presence of multiple variants may affect accuracy.     09/01/2016 5.2 4.5 - 6.2 % Final     Comment:     According to ADA guidelines, hemoglobin A1C <7.0% represents  optimal control in non-pregnant diabetic patients.  Different  metrics may apply to specific populations.   Standards of Medical Care in Diabetes - 2016.  For the purpose of screening for the presence of diabetes:  <5.7%     Consistent with the absence of diabetes  5.7-6.4%  Consistent with increasing risk for diabetes   (prediabetes)  >or=6.5%  Consistent with diabetes  Currently no consensus exists for use of hemoglobin A1C  for diagnosis of diabetes for children.         ASSESSMENT/PLAN:    Diagnoses and all orders for this  visit:    Spondylolisthesis, acquired  -     Procedure Order to Pain Management; Future      No follow-ups on file.    Today we discussed options, I have recommended an L4-5 transforaminal epidural steroid injection.

## 2023-03-16 ENCOUNTER — OFFICE VISIT (OUTPATIENT)
Dept: INTERNAL MEDICINE | Facility: CLINIC | Age: 68
End: 2023-03-16
Payer: COMMERCIAL

## 2023-03-16 VITALS
WEIGHT: 201.06 LBS | DIASTOLIC BLOOD PRESSURE: 80 MMHG | SYSTOLIC BLOOD PRESSURE: 130 MMHG | HEART RATE: 67 BPM | BODY MASS INDEX: 28.78 KG/M2 | HEIGHT: 70 IN

## 2023-03-16 DIAGNOSIS — Z85.46 HISTORY OF PROSTATE CANCER: ICD-10-CM

## 2023-03-16 DIAGNOSIS — Z00.00 ENCOUNTER FOR ANNUAL HEALTH EXAMINATION: Primary | ICD-10-CM

## 2023-03-16 DIAGNOSIS — R32 URINARY INCONTINENCE, UNSPECIFIED TYPE: ICD-10-CM

## 2023-03-16 DIAGNOSIS — Z12.11 COLON CANCER SCREENING: ICD-10-CM

## 2023-03-16 LAB
BACTERIA #/AREA URNS AUTO: NORMAL /HPF
MICROSCOPIC COMMENT: NORMAL
RBC #/AREA URNS AUTO: 0 /HPF (ref 0–4)

## 2023-03-16 PROCEDURE — 3008F BODY MASS INDEX DOCD: CPT | Mod: CPTII,S$GLB,, | Performed by: INTERNAL MEDICINE

## 2023-03-16 PROCEDURE — 3288F PR FALLS RISK ASSESSMENT DOCUMENTED: ICD-10-PCS | Mod: CPTII,S$GLB,, | Performed by: INTERNAL MEDICINE

## 2023-03-16 PROCEDURE — 3079F PR MOST RECENT DIASTOLIC BLOOD PRESSURE 80-89 MM HG: ICD-10-PCS | Mod: CPTII,S$GLB,, | Performed by: INTERNAL MEDICINE

## 2023-03-16 PROCEDURE — 1159F PR MEDICATION LIST DOCUMENTED IN MEDICAL RECORD: ICD-10-PCS | Mod: CPTII,S$GLB,, | Performed by: INTERNAL MEDICINE

## 2023-03-16 PROCEDURE — 1101F PR PT FALLS ASSESS DOC 0-1 FALLS W/OUT INJ PAST YR: ICD-10-PCS | Mod: CPTII,S$GLB,, | Performed by: INTERNAL MEDICINE

## 2023-03-16 PROCEDURE — 3079F DIAST BP 80-89 MM HG: CPT | Mod: CPTII,S$GLB,, | Performed by: INTERNAL MEDICINE

## 2023-03-16 PROCEDURE — 3044F HG A1C LEVEL LT 7.0%: CPT | Mod: CPTII,S$GLB,, | Performed by: INTERNAL MEDICINE

## 2023-03-16 PROCEDURE — 99397 PR PREVENTIVE VISIT,EST,65 & OVER: ICD-10-PCS | Mod: S$GLB,,, | Performed by: INTERNAL MEDICINE

## 2023-03-16 PROCEDURE — 81001 URINALYSIS AUTO W/SCOPE: CPT | Performed by: INTERNAL MEDICINE

## 2023-03-16 PROCEDURE — 1159F MED LIST DOCD IN RCRD: CPT | Mod: CPTII,S$GLB,, | Performed by: INTERNAL MEDICINE

## 2023-03-16 PROCEDURE — 99397 PER PM REEVAL EST PAT 65+ YR: CPT | Mod: S$GLB,,, | Performed by: INTERNAL MEDICINE

## 2023-03-16 PROCEDURE — 99999 PR PBB SHADOW E&M-EST. PATIENT-LVL III: ICD-10-PCS | Mod: PBBFAC,,, | Performed by: INTERNAL MEDICINE

## 2023-03-16 PROCEDURE — 99999 PR PBB SHADOW E&M-EST. PATIENT-LVL III: CPT | Mod: PBBFAC,,, | Performed by: INTERNAL MEDICINE

## 2023-03-16 PROCEDURE — 3288F FALL RISK ASSESSMENT DOCD: CPT | Mod: CPTII,S$GLB,, | Performed by: INTERNAL MEDICINE

## 2023-03-16 PROCEDURE — 1126F AMNT PAIN NOTED NONE PRSNT: CPT | Mod: CPTII,S$GLB,, | Performed by: INTERNAL MEDICINE

## 2023-03-16 PROCEDURE — 3044F PR MOST RECENT HEMOGLOBIN A1C LEVEL <7.0%: ICD-10-PCS | Mod: CPTII,S$GLB,, | Performed by: INTERNAL MEDICINE

## 2023-03-16 PROCEDURE — 3008F PR BODY MASS INDEX (BMI) DOCUMENTED: ICD-10-PCS | Mod: CPTII,S$GLB,, | Performed by: INTERNAL MEDICINE

## 2023-03-16 PROCEDURE — 1101F PT FALLS ASSESS-DOCD LE1/YR: CPT | Mod: CPTII,S$GLB,, | Performed by: INTERNAL MEDICINE

## 2023-03-16 PROCEDURE — 3075F PR MOST RECENT SYSTOLIC BLOOD PRESS GE 130-139MM HG: ICD-10-PCS | Mod: CPTII,S$GLB,, | Performed by: INTERNAL MEDICINE

## 2023-03-16 PROCEDURE — 3075F SYST BP GE 130 - 139MM HG: CPT | Mod: CPTII,S$GLB,, | Performed by: INTERNAL MEDICINE

## 2023-03-16 PROCEDURE — 1126F PR PAIN SEVERITY QUANTIFIED, NO PAIN PRESENT: ICD-10-PCS | Mod: CPTII,S$GLB,, | Performed by: INTERNAL MEDICINE

## 2023-03-16 NOTE — PROGRESS NOTES
Subjective:       Patient ID: Juan Luis Mcgrath is a 67 y.o. male.    Chief Complaint: Executive Health      HPI  Annual health exam. Reviewed medical, surgical, social and family history, medications, appropriate preventive health screenings, as well as vaccination history. Updates as noted below or in assessment and plan.    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  Hx of prostate ca s/p prostatectomy 2013. Notes intermittent random incontinence and would like to look into biofeedback therapy. PSA undetectable last yr, very low levels yrs prior.  Lumbar DJD hx. Mild pains at times. TF injection being considered but does not feel needed at this point in time.  2nd SCC removal this past yr. Following with Derm.    Review of Systems   All other systems reviewed and are negative.    Past Medical History:   Diagnosis Date    Degenerative joint disease (DJD) of lumbar spine     History of cold sores     Prostate cancer 2013    Sacroiliitis 04/07/2021    Squamous cell carcinoma 09/23/2019       No current outpatient medications on file.  No current facility-administered medications for this visit.    Facility-Administered Medications Ordered in Other Visits:     0.9%  NaCl infusion, , Intravenous, Continuous, Anthony Juan MD    Past Surgical History:   Procedure Laterality Date    ANTERIOR CRUCIATE LIGAMENT REPAIR      APPENDECTOMY      FINGER AMPUTATION Right 9/23/2019    Procedure: AMPUTATION, FINGER;  Surgeon: Luna Tirado MD;  Location: [facility] OR;  Service: Orthopedics;  Laterality: Right;  regional MAC    FINGER MASS EXCISION Right 9/9/2019    Procedure: EXCISION, MASS, FINGER small finger resection;  Surgeon: Luna Tirado MD;  Location: [facility] OR;  Service: Orthopedics;  Laterality: Right;  regional MAC    INJECTION OF JOINT Right 7/22/2020    Procedure: INJECTION RIGHT SI JOINT AND RIGHT PIRIFORMIS INJECTION;  Surgeon: Roosevelt Reddy MD;  Location: [facility] MGT;   Service: Pain Management;  Laterality: Right;  NEEDS CONSENT, URGENT    INJECTION OF JOINT Right 4/7/2021    Procedure: INJECTION, JOINT, SI;  Surgeon: Roosevelt Reddy MD;  Location: [facility] PAIN MGT;  Service: Pain Management;  Laterality: Right;    PROSTATECTOMY      radical    REMOVAL OF NAIL OF DIGIT Right 8/19/2019    Procedure: REMOVAL, NAIL, DIGIT right small finger;  Surgeon: Luna Tirado MD;  Location: [facility] OR;  Service: Orthopedics;  Laterality: Right;       Family History   Problem Relation Age of Onset    No Known Problems Mother     Prostate cancer Father     Prostate cancer Maternal Uncle     Melanoma Neg Hx     Colon cancer Neg Hx        Social History     Tobacco Use    Smoking status: Never    Smokeless tobacco: Never   Substance Use Topics    Alcohol use: Yes     Comment: social    Drug use: No       Immunization History   Administered Date(s) Administered    COVID-19, MRNA, LN-S, PF (Pfizer) (Purple Cap) 01/12/2021, 01/31/2021, 10/07/2021    Influenza 11/28/2018, 11/08/2019    Influenza - Quadrivalent - PF *Preferred* (6 months and older) 12/14/2016, 11/30/2017, 11/15/2018    Zoster Recombinant 01/08/2020, 01/08/2020, 12/29/2021         Objective:      Vitals:    03/16/23 1100   BP: 130/80   Pulse:        Physical Exam  Constitutional:       General: He is not in acute distress.     Appearance: Normal appearance. He is well-developed. He is not ill-appearing.   HENT:      Head: Normocephalic and atraumatic.      Right Ear: Hearing and tympanic membrane normal. There is no impacted cerumen.      Left Ear: Hearing and tympanic membrane normal. There is no impacted cerumen.      Nose: Nose normal.      Mouth/Throat:      Mouth: Mucous membranes are moist.      Pharynx: Oropharynx is clear.   Eyes:      Extraocular Movements: Extraocular movements intact.      Conjunctiva/sclera: Conjunctivae normal.      Pupils: Pupils are equal, round, and reactive to light.   Neck:      Vascular: No  "carotid bruit.   Cardiovascular:      Rate and Rhythm: Normal rate and regular rhythm.      Heart sounds: Normal heart sounds. No murmur heard.  Pulmonary:      Effort: Pulmonary effort is normal. No respiratory distress.      Breath sounds: Normal breath sounds. No wheezing, rhonchi or rales.   Abdominal:      General: Abdomen is flat. There is no distension.      Palpations: Abdomen is soft. There is no mass.      Tenderness: There is no abdominal tenderness.      Hernia: No hernia is present.   Musculoskeletal:         General: No swelling or deformity.      Cervical back: No tenderness.      Right lower leg: No edema.      Left lower leg: No edema.   Lymphadenopathy:      Cervical: No cervical adenopathy.   Skin:     General: Skin is warm and dry.      Findings: No lesion or rash.   Neurological:      General: No focal deficit present.      Mental Status: He is alert and oriented to person, place, and time.      Cranial Nerves: No cranial nerve deficit.      Coordination: Coordination normal.      Gait: Gait normal.   Psychiatric:         Mood and Affect: Mood normal.         Behavior: Behavior normal.         Thought Content: Thought content normal.         Judgment: Judgment normal.       Recent Results (from the past 2016 hour(s))   Specimen to Pathology, Dermatology    Collection Time: 12/29/22  3:00 PM   Result Value Ref Range    Final Pathologic Diagnosis       [facility] DIAGNOSIS:  SKIN, MID CHEST LESION, EXCISION:  - Early scar and previous biopsy, completely excised.  - No residual squamous cell carcinoma identified.  Francine Barney M.D.  Report attached.  Performing site:  [address]  [address] [place] [address]  "Disclaimer:  This case diagnosis was rendered completely by the outside  consultation pathologist and the case is electronically signed by an [facility]  pathologist listed below solely to release the report into the medical  record."      Disclaimer       Unless the " case is a 'gross only' or additional testing only, the final  diagnosis for each specimen is based on a microscopic examination of  appropriate tissue sections.     CBC Without Differential    Collection Time: 03/13/23 12:14 PM   Result Value Ref Range    WBC 5.05 3.90 - 12.70 K/uL    RBC 5.04 4.60 - 6.20 M/uL    Hemoglobin 16.0 14.0 - 18.0 g/dL    Hematocrit 47.8 40.0 - 54.0 %    MCV 95 82 - 98 fL    MCH 31.7 (H) 27.0 - 31.0 pg    MCHC 33.5 32.0 - 36.0 g/dL    RDW 13.3 11.5 - 14.5 %    Platelets 240 150 - 450 K/uL    MPV 9.6 9.2 - 12.9 fL   Comprehensive Metabolic Panel    Collection Time: 03/13/23 12:14 PM   Result Value Ref Range    Sodium 139 136 - 145 mmol/L    Potassium 4.4 3.5 - 5.1 mmol/L    Chloride 105 95 - 110 mmol/L    CO2 27 23 - 29 mmol/L    Glucose 94 70 - 110 mg/dL    BUN 12 8 - 23 mg/dL    Creatinine 0.9 0.5 - 1.4 mg/dL    Calcium 9.3 8.7 - 10.5 mg/dL    Total Protein 7.1 6.0 - 8.4 g/dL    Albumin 4.0 3.5 - 5.2 g/dL    Total Bilirubin 0.8 0.1 - 1.0 mg/dL    Alkaline Phosphatase 62 55 - 135 U/L    AST 25 10 - 40 U/L    ALT 25 10 - 44 U/L    Anion Gap 7 (L) 8 - 16 mmol/L    eGFR >60.0 >60 mL/min/1.73 m^2   Hemoglobin A1C    Collection Time: 03/13/23 12:14 PM   Result Value Ref Range    Hemoglobin A1C 5.4 4.0 - 5.6 %    Estimated Avg Glucose 108 68 - 131 mg/dL   Lipid Panel    Collection Time: 03/13/23 12:14 PM   Result Value Ref Range    Cholesterol 202 (H) 120 - 199 mg/dL    Triglycerides 77 30 - 150 mg/dL    HDL 67 40 - 75 mg/dL    LDL Cholesterol 119.6 63.0 - 159.0 mg/dL    HDL/Cholesterol Ratio 33.2 20.0 - 50.0 %    Total Cholesterol/HDL Ratio 3.0 2.0 - 5.0    Non-HDL Cholesterol 135 mg/dL   TSH    Collection Time: 03/13/23 12:14 PM   Result Value Ref Range    TSH 1.836 0.400 - 4.000 uIU/mL   HIV 1/2 Ag/Ab (4th Gen)    Collection Time: 03/13/23 12:14 PM   Result Value Ref Range    HIV 1/2 Ag/Ab Non-reactive Non-reactive   Prostate Specific Antigen, Diagnostic    Collection Time: 03/13/23 12:14 PM    Result Value Ref Range    PSA Diagnostic 0.03 0.00 - 4.00 ng/mL   Urinalysis Microscopic    Collection Time: 03/16/23 10:59 AM   Result Value Ref Range    RBC, UA 0 0 - 4 /hpf    Bacteria Rare None-Occ /hpf    Microscopic Comment SEE COMMENT           Assessment/Plan:     1) Annual wellness exam  2) Prostate cancer hx - Very slight jump of PSA to 0.03. Discussed Urology re-establishment vs close monitoring. UA negative today. Agreed on repeat PSA 6 mths.  3) Lumbar DJD - Stable but considering TF injection if any progression.  4) SCC skin hx (x2) - Derm following, recommended 6 mth f/u last visit which would be next month.    - Notes tetanus booster within past 10 yrs.  - Discussed colon screening. Agreed on FIT. If normal, repeat colon screening 1 yr.  - Blood pressure high normal. Continuing to monitor for now.  - Lipids and glucose screening normal. Discussed average levels, continue wt, diet, exercise focus. Repeat screening 1 yr.

## 2023-03-24 ENCOUNTER — PATIENT MESSAGE (OUTPATIENT)
Dept: PAIN MEDICINE | Facility: OTHER | Age: 68
End: 2023-03-24
Payer: COMMERCIAL

## 2023-04-06 ENCOUNTER — LAB VISIT (OUTPATIENT)
Dept: LAB | Facility: HOSPITAL | Age: 68
End: 2023-04-06
Attending: INTERNAL MEDICINE
Payer: COMMERCIAL

## 2023-04-06 DIAGNOSIS — Z12.11 COLON CANCER SCREENING: ICD-10-CM

## 2023-04-06 LAB — HEMOCCULT STL QL IA: NEGATIVE

## 2023-04-06 PROCEDURE — 82274 ASSAY TEST FOR BLOOD FECAL: CPT | Performed by: INTERNAL MEDICINE

## 2023-05-01 ENCOUNTER — PATIENT MESSAGE (OUTPATIENT)
Dept: OPTOMETRY | Facility: CLINIC | Age: 68
End: 2023-05-01
Payer: COMMERCIAL

## 2023-05-02 ENCOUNTER — OFFICE VISIT (OUTPATIENT)
Dept: OPTOMETRY | Facility: CLINIC | Age: 68
End: 2023-05-02
Payer: COMMERCIAL

## 2023-05-02 DIAGNOSIS — Z46.0 FITTING AND ADJUSTMENT OF SPECTACLES AND CONTACT LENSES: Primary | ICD-10-CM

## 2023-05-02 DIAGNOSIS — H52.4 PRESBYOPIA: ICD-10-CM

## 2023-05-02 DIAGNOSIS — H52.13 MYOPIA, BILATERAL: Primary | ICD-10-CM

## 2023-05-02 PROCEDURE — 92310 CONTACT LENS FITTING OU: CPT | Mod: CSM,S$GLB,, | Performed by: OPTOMETRIST

## 2023-05-02 PROCEDURE — 3044F PR MOST RECENT HEMOGLOBIN A1C LEVEL <7.0%: ICD-10-PCS | Mod: CPTII,,, | Performed by: OPTOMETRIST

## 2023-05-02 PROCEDURE — 92310 PR CONTACT LENS FITTING (NO CHANGE): ICD-10-PCS | Mod: CSM,S$GLB,, | Performed by: OPTOMETRIST

## 2023-05-02 PROCEDURE — 92014 PR EYE EXAM, EST PATIENT,COMPREHESV: ICD-10-PCS | Mod: S$GLB,,, | Performed by: OPTOMETRIST

## 2023-05-02 PROCEDURE — 1160F PR REVIEW ALL MEDS BY PRESCRIBER/CLIN PHARMACIST DOCUMENTED: ICD-10-PCS | Mod: CPTII,,, | Performed by: OPTOMETRIST

## 2023-05-02 PROCEDURE — 1159F MED LIST DOCD IN RCRD: CPT | Mod: CPTII,,, | Performed by: OPTOMETRIST

## 2023-05-02 PROCEDURE — 92015 DETERMINE REFRACTIVE STATE: CPT | Mod: S$GLB,,, | Performed by: OPTOMETRIST

## 2023-05-02 PROCEDURE — 3044F HG A1C LEVEL LT 7.0%: CPT | Mod: CPTII,,, | Performed by: OPTOMETRIST

## 2023-05-02 PROCEDURE — 99999 PR PBB SHADOW E&M-EST. PATIENT-LVL II: CPT | Mod: PBBFAC,,, | Performed by: OPTOMETRIST

## 2023-05-02 PROCEDURE — 1160F RVW MEDS BY RX/DR IN RCRD: CPT | Mod: CPTII,,, | Performed by: OPTOMETRIST

## 2023-05-02 PROCEDURE — 99999 PR PBB SHADOW E&M-EST. PATIENT-LVL II: ICD-10-PCS | Mod: PBBFAC,,, | Performed by: OPTOMETRIST

## 2023-05-02 PROCEDURE — 1159F PR MEDICATION LIST DOCUMENTED IN MEDICAL RECORD: ICD-10-PCS | Mod: CPTII,,, | Performed by: OPTOMETRIST

## 2023-05-02 PROCEDURE — 92014 COMPRE OPH EXAM EST PT 1/>: CPT | Mod: S$GLB,,, | Performed by: OPTOMETRIST

## 2023-05-02 PROCEDURE — 92015 PR REFRACTION: ICD-10-PCS | Mod: S$GLB,,, | Performed by: OPTOMETRIST

## 2023-05-03 NOTE — PROGRESS NOTES
HPI    Annual eye exam and CL update   DLS- 02/2021 Dr. Woods     Pt sts vision stable no changes in vision with cls. Wears CLS daily at   work.   Denies sleeping in cls. DEFERS Dilation today   Denies any eye pain     (-)Flashes (-)Floaters  (-)Itch, (-)tear, (-)burn, (-)Dryness.  (-) OTC Drops  (-)Photophobia  (-)Glare       No past eye sx       FamoHx   None   Last edited by Miladis Au on 5/2/2023  2:39 PM.            Assessment /Plan     For exam results, see Encounter Report.         Myopia, bilateral              Rx specs      CL fit today            Continue with current Dailies, increase distance power: gave trials of  -5.50 high              OK to order if happy     PVD (posterior vitreous detachment), right eye  Chorioretinal scar of right eye  RTC 1 week for DFE        NS (nuclear sclerosis), bilateral               Cortical cat OD>OS              borderline/Mild, monitor

## 2023-05-05 ENCOUNTER — PATIENT MESSAGE (OUTPATIENT)
Dept: OPHTHALMOLOGY | Facility: CLINIC | Age: 68
End: 2023-05-05
Payer: COMMERCIAL

## 2023-05-10 ENCOUNTER — OFFICE VISIT (OUTPATIENT)
Dept: OPTOMETRY | Facility: CLINIC | Age: 68
End: 2023-05-10
Payer: COMMERCIAL

## 2023-05-10 DIAGNOSIS — H43.811 PVD (POSTERIOR VITREOUS DETACHMENT), RIGHT EYE: Primary | ICD-10-CM

## 2023-05-10 PROCEDURE — 99999 PR PBB SHADOW E&M-EST. PATIENT-LVL II: ICD-10-PCS | Mod: PBBFAC,,, | Performed by: OPTOMETRIST

## 2023-05-10 PROCEDURE — 99499 UNLISTED E&M SERVICE: CPT | Mod: S$GLB,,, | Performed by: OPTOMETRIST

## 2023-05-10 PROCEDURE — 99499 NO LOS: ICD-10-PCS | Mod: S$GLB,,, | Performed by: OPTOMETRIST

## 2023-05-10 PROCEDURE — 99999 PR PBB SHADOW E&M-EST. PATIENT-LVL II: CPT | Mod: PBBFAC,,, | Performed by: OPTOMETRIST

## 2023-05-10 NOTE — PROGRESS NOTES
HPI     Concerns About Ocular Health     Additional comments: Here for DFE           Comments    Myopia, bilateral  PVD (posterior vitreous detachment), right eye  Chorioretinal scar of right eye  NS (nuclear sclerosis), bilateral          Last edited by Octavio Young on 5/10/2023  2:51 PM.            Assessment /Plan     For exam results, see Encounter Report.    PVD (posterior vitreous detachment), right eye    Pt prefers 5.25 high OU  Ok to order

## 2023-05-15 ENCOUNTER — PATIENT MESSAGE (OUTPATIENT)
Dept: OPHTHALMOLOGY | Facility: CLINIC | Age: 68
End: 2023-05-15
Payer: COMMERCIAL

## 2023-07-10 RX ORDER — CELECOXIB 100 MG/1
100 CAPSULE ORAL 2 TIMES DAILY
Qty: 60 CAPSULE | Refills: 0 | Status: SHIPPED | OUTPATIENT
Start: 2023-07-10 | End: 2023-08-07

## 2023-08-15 ENCOUNTER — TELEPHONE (OUTPATIENT)
Dept: PODIATRY | Facility: CLINIC | Age: 68
End: 2023-08-15
Payer: COMMERCIAL

## 2023-08-17 ENCOUNTER — OFFICE VISIT (OUTPATIENT)
Dept: PODIATRY | Facility: CLINIC | Age: 68
End: 2023-08-17
Payer: COMMERCIAL

## 2023-08-17 VITALS
BODY MASS INDEX: 29.35 KG/M2 | HEART RATE: 64 BPM | DIASTOLIC BLOOD PRESSURE: 84 MMHG | HEIGHT: 70 IN | SYSTOLIC BLOOD PRESSURE: 126 MMHG | WEIGHT: 205 LBS

## 2023-08-17 DIAGNOSIS — L60.9 LOOSE TOENAIL: Primary | ICD-10-CM

## 2023-08-17 PROCEDURE — 99203 OFFICE O/P NEW LOW 30 MIN: CPT | Mod: 25,S$GLB,, | Performed by: PODIATRIST

## 2023-08-17 PROCEDURE — 3074F SYST BP LT 130 MM HG: CPT | Mod: CPTII,S$GLB,, | Performed by: PODIATRIST

## 2023-08-17 PROCEDURE — 3079F PR MOST RECENT DIASTOLIC BLOOD PRESSURE 80-89 MM HG: ICD-10-PCS | Mod: CPTII,S$GLB,, | Performed by: PODIATRIST

## 2023-08-17 PROCEDURE — 3044F HG A1C LEVEL LT 7.0%: CPT | Mod: CPTII,S$GLB,, | Performed by: PODIATRIST

## 2023-08-17 PROCEDURE — 3079F DIAST BP 80-89 MM HG: CPT | Mod: CPTII,S$GLB,, | Performed by: PODIATRIST

## 2023-08-17 PROCEDURE — 99999 PR PBB SHADOW E&M-EST. PATIENT-LVL III: ICD-10-PCS | Mod: PBBFAC,,, | Performed by: PODIATRIST

## 2023-08-17 PROCEDURE — 3008F PR BODY MASS INDEX (BMI) DOCUMENTED: ICD-10-PCS | Mod: CPTII,S$GLB,, | Performed by: PODIATRIST

## 2023-08-17 PROCEDURE — 11750 NAIL REMOVAL: ICD-10-PCS | Mod: TA,S$GLB,, | Performed by: PODIATRIST

## 2023-08-17 PROCEDURE — 1126F PR PAIN SEVERITY QUANTIFIED, NO PAIN PRESENT: ICD-10-PCS | Mod: CPTII,S$GLB,, | Performed by: PODIATRIST

## 2023-08-17 PROCEDURE — 3008F BODY MASS INDEX DOCD: CPT | Mod: CPTII,S$GLB,, | Performed by: PODIATRIST

## 2023-08-17 PROCEDURE — 11750 EXCISION NAIL&NAIL MATRIX: CPT | Mod: TA,S$GLB,, | Performed by: PODIATRIST

## 2023-08-17 PROCEDURE — 3074F PR MOST RECENT SYSTOLIC BLOOD PRESSURE < 130 MM HG: ICD-10-PCS | Mod: CPTII,S$GLB,, | Performed by: PODIATRIST

## 2023-08-17 PROCEDURE — 99203 PR OFFICE/OUTPT VISIT, NEW, LEVL III, 30-44 MIN: ICD-10-PCS | Mod: 25,S$GLB,, | Performed by: PODIATRIST

## 2023-08-17 PROCEDURE — 1159F PR MEDICATION LIST DOCUMENTED IN MEDICAL RECORD: ICD-10-PCS | Mod: CPTII,S$GLB,, | Performed by: PODIATRIST

## 2023-08-17 PROCEDURE — 99999 PR PBB SHADOW E&M-EST. PATIENT-LVL III: CPT | Mod: PBBFAC,,, | Performed by: PODIATRIST

## 2023-08-17 PROCEDURE — 1126F AMNT PAIN NOTED NONE PRSNT: CPT | Mod: CPTII,S$GLB,, | Performed by: PODIATRIST

## 2023-08-17 PROCEDURE — 3044F PR MOST RECENT HEMOGLOBIN A1C LEVEL <7.0%: ICD-10-PCS | Mod: CPTII,S$GLB,, | Performed by: PODIATRIST

## 2023-08-17 PROCEDURE — 1159F MED LIST DOCD IN RCRD: CPT | Mod: CPTII,S$GLB,, | Performed by: PODIATRIST

## 2023-08-20 NOTE — PROGRESS NOTES
Subjective:      Patient ID: Juan Luis Mcgrath is a 68 y.o. male.    Chief Complaint: Nail Problem (L great toenail) and Blister (L great toe )    Pt presents c/o a loose toenail, with a blister underneath. Pt states the thinks this happened after hiking. Pt states the toenail has been bleeding and draining. Pt also states the nail is painful 4/10.     Review of Systems   Constitutional: Negative for chills, fever and malaise/fatigue.   HENT:  Negative for hearing loss.    Cardiovascular:  Negative for claudication.   Respiratory:  Negative for shortness of breath.    Skin:  Positive for nail changes. Negative for flushing and rash.   Musculoskeletal:  Negative for joint pain and myalgias.   Neurological:  Negative for loss of balance, numbness, paresthesias and sensory change.   Psychiatric/Behavioral:  Negative for altered mental status.            Objective:      Physical Exam  Vitals reviewed.   Cardiovascular:      Pulses:           Dorsalis pedis pulses are 2+ on the right side and 2+ on the left side.        Posterior tibial pulses are 2+ on the right side and 2+ on the left side.      Comments: No edema noted b/L  Musculoskeletal:      Comments:        Feet:      Right foot:      Protective Sensation: 5 sites tested.  5 sites sensed.      Left foot:      Protective Sensation: 5 sites tested.  5 sites sensed.   Skin:     General: Skin is warm.      Comments: Hallux nail non-adherent proximally but adherent to distal nail bed.     Fluctuance noted to proximal nail bed  Upon removal of nail, clear serous fluid evacuated from nail bed  Nail bed open, ulcerated  No purulence or SOI noted   Neurological:      Mental Status: He is alert.      Comments: Gross sensation intact b/L               Assessment:       Encounter Diagnosis   Name Primary?    Loose toenail - Left Foot Yes         Plan:       Juan Luis was seen today for nail problem and blister.    Diagnoses and all orders for this visit:    Loose toenail -  Left Foot      I counseled the patient on his conditions, their implications and medical management.    Nail Removal    Date/Time: 8/17/2023 9:30 AM    Performed by: Campbell See DPM  Authorized by: Campbell See DPM    Consent Done?:  Yes (Verbal)  Location:     Location:  Left foot    Location detail:  Left big toe  Anesthesia:     Anesthesia:  Digital block    Local anesthetic:  Lidocaine 1% without epinephrine and bupivacaine 0.5% without epinephrine    Anesthetic total (ml):  5  Procedure Details:     Preparation:  Skin prepped with Betadine    Amount removed:  Complete    Wedge excision of skin of nail fold: No      Nail bed sutured?: No      Nail matrix removed:  Complete    Removal method:  Surgical    Dressing applied:  Antibiotic ointment and dressing applied    Patient tolerance:  Patient tolerated the procedure well with no immediate complications     Home instructions given to pt  RTC in 1 week or sooner if any new pedal problems arise, any signs of infection occur, or if condition worsens.   Darco shoe dispensed        .

## 2023-08-25 ENCOUNTER — TELEPHONE (OUTPATIENT)
Dept: UROLOGY | Facility: CLINIC | Age: 68
End: 2023-08-25
Payer: COMMERCIAL

## 2023-08-28 ENCOUNTER — TELEPHONE (OUTPATIENT)
Dept: PAIN MEDICINE | Facility: CLINIC | Age: 68
End: 2023-08-28
Payer: COMMERCIAL

## 2023-08-28 ENCOUNTER — TELEPHONE (OUTPATIENT)
Dept: UROLOGY | Facility: CLINIC | Age: 68
End: 2023-08-28
Payer: COMMERCIAL

## 2023-08-28 NOTE — TELEPHONE ENCOUNTER
----- Message from Deborah Betts sent at 8/28/2023 11:17 AM CDT -----  Contact: TOM RED [[id number]]  Type: Call Back      Who called: TOM RED [[id number]]      What is the request in detail: Patient is requesting a call back in regard to scheduling his procedure.    Please advise.     Can the clinic reply by [facility]? Yes      Would the patient rather a call back or a response via [facility]? Call back       Best call back number: [phone] (home) [phone] (work)      Additional Information:

## 2023-08-29 ENCOUNTER — TELEPHONE (OUTPATIENT)
Dept: UROLOGY | Facility: CLINIC | Age: 68
End: 2023-08-29
Payer: COMMERCIAL

## 2023-09-04 NOTE — PROGRESS NOTES
Subjective:      Juan Luis Mcgrath is a 68 y.o. 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    Saw Dr Renee 2016    Juan Luis Mcgrath is a 61 y.o. male with a history of prostate cancer. Diagnosed in 2013. Had robotic assisted laparoscopic prostatectomy by Arturo in 2013. No path report .  PSA 10/14/14 was <0.010. 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  Leaks some- 1 mini pad/day. Discussed Kathi Mcelroy.ED an issue, uses Cialis, Viagra with success..    Nocturia  No daytime LUTS    Some incontinence  Wears small pad  Uses 0-1 pads daily  Has not used any pads in the last week    Some spontaneous erections  Often uses viagra  Firm enough for penetration    The following portions of the patient's history were reviewed and updated as appropriate: allergies, current medications, past family history, past medical history, past social history, past surgical history and problem list.    Review of Systems  Pertinent items are noted in HPI.  A comprehensive multipoint review of systems was negative except as otherwise stated in the HPI.    Past Medical History:   Diagnosis Date    Degenerative joint disease (DJD) of lumbar spine     History of cold sores     Prostate cancer 2013    Sacroiliitis 04/07/2021    Squamous cell carcinoma 09/23/2019     Past Surgical History:   Procedure Laterality Date    ANTERIOR CRUCIATE LIGAMENT REPAIR      APPENDECTOMY      FINGER AMPUTATION Right 9/23/2019    Procedure: AMPUTATION, FINGER;  Surgeon: Luna Tirado MD;  Location: [facility] OR;  Service: Orthopedics;  Laterality: Right;  regional MAC    FINGER MASS EXCISION Right 9/9/2019    Procedure: EXCISION, MASS, FINGER small finger resection;  Surgeon: Luna Tirado MD;  Location: [facility] OR;  Service: Orthopedics;  Laterality: Right;  regional MAC    INJECTION OF JOINT Right 7/22/2020    Procedure: INJECTION RIGHT SI JOINT AND RIGHT PIRIFORMIS  INJECTION;  Surgeon: Roosevelt Reddy MD;  Location: [facility] [place] PAIN MGT;  Service: Pain Management;  Laterality: Right;  NEEDS CONSENT, URGENT    INJECTION OF JOINT Right 4/7/2021    Procedure: INJECTION, JOINT, SI;  Surgeon: Roosevelt Reddy MD;  Location: [facility] [place] [facility]IN MGT;  Service: Pain Management;  Laterality: Right;    PROSTATECTOMY      radical    REMOVAL OF NAIL OF DIGIT Right 8/19/2019    Procedure: REMOVAL, NAIL, DIGIT right small finger;  Surgeon: Luna Tirado MD;  Location: [facility] [place] OR;  Service: Orthopedics;  Laterality: Right;       Review of patient's allergies indicates:  No Known Allergies       Objective:   Vitals: There were no vitals taken for this visit.    Physical Exam   General: alert and oriented, no acute distress  Respiratory: Symmetric expansion, non-labored breathing  Cardiovascular: no peripheral edema  Abdomen: soft, non distended  Skin: normal coloration and turgor, no rashes, no suspicious skin lesions noted  Neuro: no gross deficits  Psych: normal judgment and insight, normal mood/affect, and non-anxious  SHYANNE NPR    Physical Exam    Lab Review   Urinalysis demonstrates no specimen    Lab Results   Component Value Date    WBC 5.05 03/13/2023    HGB 16.0 03/13/2023    HCT 47.8 03/13/2023    MCV 95 03/13/2023     03/13/2023     Lab Results   Component Value Date    CREATININE 0.9 03/13/2023    BUN 12 03/13/2023     Lab Results   Component Value Date    PSA 0.02 12/30/2019    PSA 0.02 09/01/2016    PSADIAG 0.03 03/13/2023    PSADIAG <0.01 12/29/2021    PSADIAG 0.02 12/30/2019    PSADIAG 0.01 12/18/2018    PSADIAG 0.02 09/01/2016           Imaging  -    Assessment and Plan:   History of prostate cancer  PSA today  Dr Mcgrath will try to get the surgical path report from [facility] where he had his robotic prostatectomy done  RTC 3 months with repeat PSA    If his surgical path has adverse features, consider referral to rad onc    Male stress  incontinence  Kegels  PFPT    Nocturia  Voiding diary; he is sure he is not producing more urine volume at night and does not want to do a voiding diary  UA and reflex cs

## 2023-09-05 ENCOUNTER — LAB VISIT (OUTPATIENT)
Dept: LAB | Facility: HOSPITAL | Age: 68
End: 2023-09-05
Attending: UROLOGY
Payer: COMMERCIAL

## 2023-09-05 ENCOUNTER — OFFICE VISIT (OUTPATIENT)
Dept: UROLOGY | Facility: CLINIC | Age: 68
End: 2023-09-05
Payer: COMMERCIAL

## 2023-09-05 VITALS
OXYGEN SATURATION: 96 % | HEIGHT: 70 IN | RESPIRATION RATE: 16 BRPM | BODY MASS INDEX: 28.36 KG/M2 | SYSTOLIC BLOOD PRESSURE: 171 MMHG | HEART RATE: 94 BPM | WEIGHT: 198.06 LBS | DIASTOLIC BLOOD PRESSURE: 98 MMHG

## 2023-09-05 DIAGNOSIS — Z85.46 HISTORY OF PROSTATE CANCER: ICD-10-CM

## 2023-09-05 DIAGNOSIS — Z85.46 HISTORY OF PROSTATE CANCER: Primary | ICD-10-CM

## 2023-09-05 DIAGNOSIS — N39.3 MALE STRESS INCONTINENCE: ICD-10-CM

## 2023-09-05 LAB
BILIRUB UR QL STRIP: NEGATIVE
CLARITY UR REFRACT.AUTO: CLEAR
COLOR UR AUTO: COLORLESS
COMPLEXED PSA SERPL-MCNC: 0.06 NG/ML (ref 0–4)
GLUCOSE UR QL STRIP: NEGATIVE
HGB UR QL STRIP: NEGATIVE
KETONES UR QL STRIP: ABNORMAL
LEUKOCYTE ESTERASE UR QL STRIP: NEGATIVE
NITRITE UR QL STRIP: NEGATIVE
PH UR STRIP: 5 [PH] (ref 5–8)
PROT UR QL STRIP: NEGATIVE
SP GR UR STRIP: 1.01 (ref 1–1.03)
URN SPEC COLLECT METH UR: ABNORMAL

## 2023-09-05 PROCEDURE — 99204 OFFICE O/P NEW MOD 45 MIN: CPT | Mod: S$GLB,,, | Performed by: UROLOGY

## 2023-09-05 PROCEDURE — 99204 PR OFFICE/OUTPT VISIT, NEW, LEVL IV, 45-59 MIN: ICD-10-PCS | Mod: S$GLB,,, | Performed by: UROLOGY

## 2023-09-05 PROCEDURE — 81003 URINALYSIS AUTO W/O SCOPE: CPT | Performed by: UROLOGY

## 2023-09-05 PROCEDURE — 99999 PR PBB SHADOW E&M-EST. PATIENT-LVL III: CPT | Mod: PBBFAC,,, | Performed by: UROLOGY

## 2023-09-05 PROCEDURE — 3080F DIAST BP >= 90 MM HG: CPT | Mod: CPTII,S$GLB,, | Performed by: UROLOGY

## 2023-09-05 PROCEDURE — 36415 COLL VENOUS BLD VENIPUNCTURE: CPT | Performed by: UROLOGY

## 2023-09-05 PROCEDURE — 1126F PR PAIN SEVERITY QUANTIFIED, NO PAIN PRESENT: ICD-10-PCS | Mod: CPTII,S$GLB,, | Performed by: UROLOGY

## 2023-09-05 PROCEDURE — 1101F PR PT FALLS ASSESS DOC 0-1 FALLS W/OUT INJ PAST YR: ICD-10-PCS | Mod: CPTII,S$GLB,, | Performed by: UROLOGY

## 2023-09-05 PROCEDURE — 3044F PR MOST RECENT HEMOGLOBIN A1C LEVEL <7.0%: ICD-10-PCS | Mod: CPTII,S$GLB,, | Performed by: UROLOGY

## 2023-09-05 PROCEDURE — 1101F PT FALLS ASSESS-DOCD LE1/YR: CPT | Mod: CPTII,S$GLB,, | Performed by: UROLOGY

## 2023-09-05 PROCEDURE — 3008F PR BODY MASS INDEX (BMI) DOCUMENTED: ICD-10-PCS | Mod: CPTII,S$GLB,, | Performed by: UROLOGY

## 2023-09-05 PROCEDURE — 3288F FALL RISK ASSESSMENT DOCD: CPT | Mod: CPTII,S$GLB,, | Performed by: UROLOGY

## 2023-09-05 PROCEDURE — 3008F BODY MASS INDEX DOCD: CPT | Mod: CPTII,S$GLB,, | Performed by: UROLOGY

## 2023-09-05 PROCEDURE — 3288F PR FALLS RISK ASSESSMENT DOCUMENTED: ICD-10-PCS | Mod: CPTII,S$GLB,, | Performed by: UROLOGY

## 2023-09-05 PROCEDURE — 3080F PR MOST RECENT DIASTOLIC BLOOD PRESSURE >= 90 MM HG: ICD-10-PCS | Mod: CPTII,S$GLB,, | Performed by: UROLOGY

## 2023-09-05 PROCEDURE — 3044F HG A1C LEVEL LT 7.0%: CPT | Mod: CPTII,S$GLB,, | Performed by: UROLOGY

## 2023-09-05 PROCEDURE — 99999 PR PBB SHADOW E&M-EST. PATIENT-LVL III: ICD-10-PCS | Mod: PBBFAC,,, | Performed by: UROLOGY

## 2023-09-05 PROCEDURE — 3077F PR MOST RECENT SYSTOLIC BLOOD PRESSURE >= 140 MM HG: ICD-10-PCS | Mod: CPTII,S$GLB,, | Performed by: UROLOGY

## 2023-09-05 PROCEDURE — 84153 ASSAY OF PSA TOTAL: CPT | Performed by: UROLOGY

## 2023-09-05 PROCEDURE — 1126F AMNT PAIN NOTED NONE PRSNT: CPT | Mod: CPTII,S$GLB,, | Performed by: UROLOGY

## 2023-09-05 PROCEDURE — 1159F PR MEDICATION LIST DOCUMENTED IN MEDICAL RECORD: ICD-10-PCS | Mod: CPTII,S$GLB,, | Performed by: UROLOGY

## 2023-09-05 PROCEDURE — 3077F SYST BP >= 140 MM HG: CPT | Mod: CPTII,S$GLB,, | Performed by: UROLOGY

## 2023-09-05 PROCEDURE — 1159F MED LIST DOCD IN RCRD: CPT | Mod: CPTII,S$GLB,, | Performed by: UROLOGY

## 2023-09-07 ENCOUNTER — TELEPHONE (OUTPATIENT)
Dept: SPINE | Facility: CLINIC | Age: 68
End: 2023-09-07
Payer: COMMERCIAL

## 2023-09-07 NOTE — TELEPHONE ENCOUNTER
Staff spoke with patient regarding appointment on 09/14/23 with MD Rich due to provider being out on that day. Patient stated that he would give the office a call back to be rescheduled.

## 2023-09-08 ENCOUNTER — TELEPHONE (OUTPATIENT)
Dept: PAIN MEDICINE | Facility: CLINIC | Age: 68
End: 2023-09-08
Payer: COMMERCIAL

## 2023-09-08 NOTE — TELEPHONE ENCOUNTER
Staff contacted pt because his appointment with Dr. Reddy was canceled on the 14th. Pt said he was confused because he is not a new patient and has been seen before. Staff saw that he has been seen before for procedure. Pt said there was an order put in for a procedure but he never got it done. Staff said staff would check if the order was still valid and if not staff would message Dr. Reddy about putting another order in.

## 2023-09-08 NOTE — TELEPHONE ENCOUNTER
----- Message from Phoenix Kwon sent at 9/8/2023  3:36 PM CDT -----      Name of Who is Calling: TOM RED [[id number]]      What is the request in detail: Pt called to speak with the office regarding appt that was cancelled pt is in severe pain and would like to see anyone the appt date was his only day off.Please contact to further discuss and advise.          Can the clinic reply by [facility]: Y      What Number to [facility] Back [facility]: [phone]

## 2023-09-14 ENCOUNTER — OFFICE VISIT (OUTPATIENT)
Dept: PAIN MEDICINE | Facility: CLINIC | Age: 68
End: 2023-09-14
Payer: COMMERCIAL

## 2023-09-14 VITALS
OXYGEN SATURATION: 100 % | SYSTOLIC BLOOD PRESSURE: 135 MMHG | DIASTOLIC BLOOD PRESSURE: 83 MMHG | BODY MASS INDEX: 28.91 KG/M2 | HEIGHT: 70 IN | HEART RATE: 66 BPM | RESPIRATION RATE: 18 BRPM | WEIGHT: 201.94 LBS

## 2023-09-14 DIAGNOSIS — M54.17 LUMBOSACRAL RADICULOPATHY: Primary | ICD-10-CM

## 2023-09-14 DIAGNOSIS — M51.36 DDD (DEGENERATIVE DISC DISEASE), LUMBAR: ICD-10-CM

## 2023-09-14 PROCEDURE — 1101F PT FALLS ASSESS-DOCD LE1/YR: CPT | Mod: CPTII,S$GLB,, | Performed by: NURSE PRACTITIONER

## 2023-09-14 PROCEDURE — 3044F HG A1C LEVEL LT 7.0%: CPT | Mod: CPTII,S$GLB,, | Performed by: NURSE PRACTITIONER

## 2023-09-14 PROCEDURE — 3044F PR MOST RECENT HEMOGLOBIN A1C LEVEL <7.0%: ICD-10-PCS | Mod: CPTII,S$GLB,, | Performed by: NURSE PRACTITIONER

## 2023-09-14 PROCEDURE — 1159F PR MEDICATION LIST DOCUMENTED IN MEDICAL RECORD: ICD-10-PCS | Mod: CPTII,S$GLB,, | Performed by: NURSE PRACTITIONER

## 2023-09-14 PROCEDURE — 1125F PR PAIN SEVERITY QUANTIFIED, PAIN PRESENT: ICD-10-PCS | Mod: CPTII,S$GLB,, | Performed by: NURSE PRACTITIONER

## 2023-09-14 PROCEDURE — 3079F DIAST BP 80-89 MM HG: CPT | Mod: CPTII,S$GLB,, | Performed by: NURSE PRACTITIONER

## 2023-09-14 PROCEDURE — 3008F PR BODY MASS INDEX (BMI) DOCUMENTED: ICD-10-PCS | Mod: CPTII,S$GLB,, | Performed by: NURSE PRACTITIONER

## 2023-09-14 PROCEDURE — 1160F PR REVIEW ALL MEDS BY PRESCRIBER/CLIN PHARMACIST DOCUMENTED: ICD-10-PCS | Mod: CPTII,S$GLB,, | Performed by: NURSE PRACTITIONER

## 2023-09-14 PROCEDURE — 99999 PR PBB SHADOW E&M-EST. PATIENT-LVL III: CPT | Mod: PBBFAC,,, | Performed by: NURSE PRACTITIONER

## 2023-09-14 PROCEDURE — 3075F SYST BP GE 130 - 139MM HG: CPT | Mod: CPTII,S$GLB,, | Performed by: NURSE PRACTITIONER

## 2023-09-14 PROCEDURE — 3288F PR FALLS RISK ASSESSMENT DOCUMENTED: ICD-10-PCS | Mod: CPTII,S$GLB,, | Performed by: NURSE PRACTITIONER

## 2023-09-14 PROCEDURE — 1101F PR PT FALLS ASSESS DOC 0-1 FALLS W/OUT INJ PAST YR: ICD-10-PCS | Mod: CPTII,S$GLB,, | Performed by: NURSE PRACTITIONER

## 2023-09-14 PROCEDURE — 1159F MED LIST DOCD IN RCRD: CPT | Mod: CPTII,S$GLB,, | Performed by: NURSE PRACTITIONER

## 2023-09-14 PROCEDURE — 99203 OFFICE O/P NEW LOW 30 MIN: CPT | Mod: S$GLB,,, | Performed by: NURSE PRACTITIONER

## 2023-09-14 PROCEDURE — 3079F PR MOST RECENT DIASTOLIC BLOOD PRESSURE 80-89 MM HG: ICD-10-PCS | Mod: CPTII,S$GLB,, | Performed by: NURSE PRACTITIONER

## 2023-09-14 PROCEDURE — 99203 PR OFFICE/OUTPT VISIT, NEW, LEVL III, 30-44 MIN: ICD-10-PCS | Mod: S$GLB,,, | Performed by: NURSE PRACTITIONER

## 2023-09-14 PROCEDURE — 1125F AMNT PAIN NOTED PAIN PRSNT: CPT | Mod: CPTII,S$GLB,, | Performed by: NURSE PRACTITIONER

## 2023-09-14 PROCEDURE — 3075F PR MOST RECENT SYSTOLIC BLOOD PRESS GE 130-139MM HG: ICD-10-PCS | Mod: CPTII,S$GLB,, | Performed by: NURSE PRACTITIONER

## 2023-09-14 PROCEDURE — 99999 PR PBB SHADOW E&M-EST. PATIENT-LVL III: ICD-10-PCS | Mod: PBBFAC,,, | Performed by: NURSE PRACTITIONER

## 2023-09-14 PROCEDURE — 3008F BODY MASS INDEX DOCD: CPT | Mod: CPTII,S$GLB,, | Performed by: NURSE PRACTITIONER

## 2023-09-14 PROCEDURE — 3288F FALL RISK ASSESSMENT DOCD: CPT | Mod: CPTII,S$GLB,, | Performed by: NURSE PRACTITIONER

## 2023-09-14 PROCEDURE — 1160F RVW MEDS BY RX/DR IN RCRD: CPT | Mod: CPTII,S$GLB,, | Performed by: NURSE PRACTITIONER

## 2023-09-14 NOTE — PROGRESS NOTES
Chronic Pain - Established Visit    Referring Physician: Tyson, Ricky    Chief Complaint:   Chief Complaint   Patient presents with    Low-back Pain     Right buttox        SUBJECTIVE:    Interval History 9/14/2023:  The patient returns to clinic today for follow up of back pain. He has not been seen in clinic for 3 years, which makes him a new patient today. He was scheduled for MARA in March as a direct referral by Dr. Garcia. This was not performed. His pain has worsened over the last few months. He reports worsened low back pain that radiates into the right buttock and posterior thigh. He will have intermittent numbness into the right anterior foot. He denies any left sided pain. His pain is worse with prolonged standing and walking. He is no longer taking Celebrex, as this was not helpful. He does perform a home exercise routine. He denies any other health changes. His pain today is 6/10.    HPI:  AníbalSrini Mcgrath presents to the clinic for the evaluation of low back pain. The pain started 2 months ago following twisting in chair and symptoms have been worsening.The pain is located in the low back  area and radiates to the right lower extemity.  The pain is described as aching and is rated as 5/10. The pain is rated with a score of  5/10 on the BEST day and a score of 5/10 on the WORST day.  Symptoms interfere with daily activity. The pain is exacerbated by Walking and running.  The pain is mitigated by stretching. He reports spending 0 hours per day reclining. The patient reports 6 hours of uninterrupted sleep per night.  DR. Mcgrath states he is pretty active in about 2 months ago while twisting in a chair he felt severe back pain that continues to progress and worsen over the right lower back, buttocks area and radiating to the right thigh  He is here today for evaluation as the pain has been affecting his functions of daily living and preventing him from exercising    Patient denies night  fever/night sweats, urinary incontinence, bowel incontinence, significant weight loss, significant motor weakness and loss of sensations.    Physical Therapy/Home Exercise: yes    Pain Disability Index Review:      9/14/2023     2:40 PM 5/18/2020     4:09 PM   Last 3 PDI Scores   Pain Disability Index (PDI) 43 5       Pain Medications:  None     report:  Reviewed and consistent with medication use as prescribed.    Pain Procedures:   7/22/2020- Right Si joint and GTB injection  4/7/2021- Right SI joint injection      Imaging:   MRI Lumbar Spine 8/1/2022:  COMPARISON:  12/17/2020     FINDINGS:  Note made of lumbarization of S1 with small S1-S2 disc.     Alignment: There is grade 1 anterolisthesis at L4-L5. There is levoconvex curvature of the lumbar spine.     Vertebrae: No fracture or marrow infiltrative process.  Note made of reactive marrow edema about the left L4-L5 facet joint.     Discs: Moderate disc height loss at L4-S1.  No evidence for discitis.     Cord: Conus terminates at L1 and appears unremarkable.  Cauda equina appears unremarkable.     Degenerative findings:     T12-L1: No spinal canal stenosis or neural foraminal narrowing.     L1-L2: No spinal canal stenosis or neural foraminal narrowing.     L2-L3: No spinal canal stenosis or neural foraminal narrowing.     L3-L4: Mild facet arthropathy.  No spinal canal stenosis or neural foraminal narrowing.     L4-L5: Uncovering of the intervertebral disc with circumferential bulge and severe facet arthropathy result in moderate spinal canal stenosis and mild right neural foraminal narrowing.     L5-S1: Circumferential disc bulge asymmetric to the left and moderate left facet arthropathy result in moderate left neural foraminal narrowing.  Disc material contacts the left L5 nerve root in the extraforaminal zone.     Paraspinal muscles & soft tissues: Unremarkable.     Sacroiliac joints exhibit mild degenerative changes with few nonspecific foci of marrow  edema.  No evidence for sacroiliitis.     Impression:     1. Multilevel degenerative changes of the lumbar spine detailed above.  Moderate spinal canal stenosis noted at L4-L5.  Moderate left neural foraminal narrowing noted at L5-S1.    Past Medical History:   Diagnosis Date    Degenerative joint disease (DJD) of lumbar spine     History of cold sores     Prostate cancer 2013    Sacroiliitis 04/07/2021    Squamous cell carcinoma 09/23/2019     Past Surgical History:   Procedure Laterality Date    ANTERIOR CRUCIATE LIGAMENT REPAIR      APPENDECTOMY      FINGER AMPUTATION Right 9/23/2019    Procedure: AMPUTATION, FINGER;  Surgeon: Luna Tirado MD;  Location: [facility];  Service: Orthopedics;  Laterality: Right;  regional MAC    FINGER MASS EXCISION Right 9/9/2019    Procedure: EXCISION, MASS, FINGER small finger resection;  Surgeon: Luna Tirado MD;  Location: [facility];  Service: Orthopedics;  Laterality: Right;  regional MAC    INJECTION OF JOINT Right 7/22/2020    Procedure: INJECTION RIGHT SI JOINT AND RIGHT PIRIFORMIS INJECTION;  Surgeon: Roosevelt Reddy MD;  Location: [facility];  Service: Pain Management;  Laterality: Right;  NEEDS CONSENT, URGENT    INJECTION OF JOINT Right 4/7/2021    Procedure: INJECTION, JOINT, SI;  Surgeon: Roosevelt Reddy MD;  Location: [facility];  Service: Pain Management;  Laterality: Right;    PROSTATECTOMY      radical    REMOVAL OF NAIL OF DIGIT Right 8/19/2019    Procedure: REMOVAL, NAIL, DIGIT right small finger;  Surgeon: Luna Tirado MD;  Location: [facility];  Service: Orthopedics;  Laterality: Right;     Social History     Socioeconomic History    Marital status:    Occupational History    Occupation: anesthesiologist   Tobacco Use    Smoking status: Never    Smokeless tobacco: Never   Substance and Sexual Activity    Alcohol use: Yes     Comment: social    Drug use: No    Sexual activity: Yes     Partners: Female   Social History  Narrative    Moved from [place] 3 weeks ago. Lives with wife.     Social Determinants of Health     Financial Resource Strain: Low Risk  (9/5/2023)    Overall Financial Resource Strain (CARDIA)     Difficulty of Paying Living Expenses: Not hard at all   Food Insecurity: No Food Insecurity (9/5/2023)    Hunger Vital Sign     Worried About Running Out of Food in the Last Year: Never true     Ran Out of Food in the Last Year: Never true   Transportation Needs: No Transportation Needs (9/5/2023)    PRAPARE - Transportation     Lack of Transportation (Medical): No     Lack of Transportation (Non-Medical): No   Physical Activity: Sufficiently Active (9/5/2023)    Exercise Vital Sign     Days of Exercise per Week: 5 days     Minutes of Exercise per Session: 30 min   Stress: Unknown (9/5/2023)    [employer] [place] [employer] - Occupational Stress Questionnaire     Feeling of Stress : Patient refused   Social Connections: Unknown (9/5/2023)    Social Connection and Isolation Panel [NHANES]     Frequency of Communication with Friends and Family: Patient refused     Frequency of Social Gatherings with Friends and Family: Patient refused     Active Member of Clubs or Organizations: No     Attends Club or Organization Meetings: Never     Marital Status:    Housing Stability: Unknown (9/5/2023)    Housing Stability Vital Sign     Unable to Pay for Housing in the Last Year: No     Unstable Housing in the Last Year: Patient refused     Family History   Problem Relation Age of Onset    No Known Problems Mother     Prostate cancer Father     Prostate cancer Maternal Uncle     Melanoma Neg Hx     Colon cancer Neg Hx        Review of patient's allergies indicates:  No Known Allergies    Current Outpatient Medications   Medication Sig    celecoxib (CELEBREX) 100 MG capsule TAKE 1 CAPSULE BY MOUTH TWICE A DAY (Patient not taking: Reported on 9/5/2023)     No current facility-administered medications for this visit.  "    Facility-Administered Medications Ordered in Other Visits   Medication    0.9%  NaCl infusion       REVIEW OF SYSTEMS:    GENERAL:  No weight loss, malaise or fevers.  HEENT:  Negative for frequent or significant headaches.  NECK:  Negative for lumps, goiter, pain and significant neck swelling.  RESPIRATORY:  Negative for cough, wheezing or shortness of breath.  CARDIOVASCULAR:  Negative for chest pain, leg swelling or palpitations.  GI:  Negative for abdominal discomfort, blood in stools or black stools or change in bowel habits.  MUSCULOSKELETAL:  See HPI.  SKIN:  Negative for lesions, rash, and itching.  PSYCH:  Positive for sleep disturbance, mood disorder and recent psychosocial stressors.  HEMATOLOGY/LYMPHOLOGY:  Negative for prolonged bleeding, bruising easily or swollen nodes. Hx of prostate cancer.   NEURO:   No history of headaches, syncope, paralysis, seizures or tremors.  All other reviewed and negative other than HPI.    OBJECTIVE:    /83 (BP Location: Right arm, Patient Position: Sitting, BP Method: Small (Automatic))   Pulse 66   Resp 18   Ht 5' 10" (1.778 m)   Wt 91.6 kg (201 lb 15.1 oz)   SpO2 100%   BMI 28.98 kg/m²     PHYSICAL EXAMINATION:    General appearance: Well appearing, in no acute distress, alert and oriented x3.  Psych:  Mood and affect appropriate.  Skin: Skin color, texture, turgor normal, no rashes or lesions, in both upper and lower body.  Head/face:  Normocephalic, atraumatic.   Cor: RRR  Pulm: Symmetric chest rise, no respiratory distress noted.   Back: Straight leg raising in the sitting position is negative to radicular pain bilaterally. There is mild pain with palpation over right lumbar facet joints. Full ROM with pain on flexion and extension.    Extremities: No deformities, edema, or skin discoloration. Good capillary refill.  Musculoskeletal: Bilateral lower extremity strength is normal and symmetric.  No atrophy or tone abnormalities are noted.  Neuro: " Bilateral lower extremity coordination and muscle stretch reflexes are physiologic and symmetric.  Plantar response are downgoing. No loss of sensation is noted.  Gait: normal.    ASSESSMENT: 68 y.o. year old male with low back pain, consistent with the followin. Lumbosacral radiculopathy  Procedure Order to Pain Management      2. DDD (degenerative disc disease), lumbar  Procedure Order to Pain Management              PLAN:     - Previous imaging was reviewed and discussed with the patient today.    - Schedule for right L4/5 and L5/S1 TF MARA.     - I have stressed the importance of physical activity and a home exercise plan to help with pain and improve health.    - RTC 2 weeks after above procedure.     The above plan and management options were discussed at length with patient. Patient is in agreement with the above and verbalized understanding.     Yamila Maher NP  2023

## 2023-09-14 NOTE — H&P (VIEW-ONLY)
Chronic Pain - Established Visit    Referring Physician: Tyson, Ricky    Chief Complaint:   Chief Complaint   Patient presents with    Low-back Pain     Right buttox        SUBJECTIVE:    Interval History 9/14/2023:  The patient returns to clinic today for follow up of back pain. He has not been seen in clinic for 3 years, which makes him a new patient today. He was scheduled for MARA in March as a direct referral by Dr. Garcia. This was not performed. His pain has worsened over the last few months. He reports worsened low back pain that radiates into the right buttock and posterior thigh. He will have intermittent numbness into the right anterior foot. He denies any left sided pain. His pain is worse with prolonged standing and walking. He is no longer taking Celebrex, as this was not helpful. He does perform a home exercise routine. He denies any other health changes. His pain today is 6/10.    HPI:  AníbalSrini Mcgrath presents to the clinic for the evaluation of low back pain. The pain started 2 months ago following twisting in chair and symptoms have been worsening.The pain is located in the low back  area and radiates to the right lower extemity.  The pain is described as aching and is rated as 5/10. The pain is rated with a score of  5/10 on the BEST day and a score of 5/10 on the WORST day.  Symptoms interfere with daily activity. The pain is exacerbated by Walking and running.  The pain is mitigated by stretching. He reports spending 0 hours per day reclining. The patient reports 6 hours of uninterrupted sleep per night.  DR. Mcgrath states he is pretty active in about 2 months ago while twisting in a chair he felt severe back pain that continues to progress and worsen over the right lower back, buttocks area and radiating to the right thigh  He is here today for evaluation as the pain has been affecting his functions of daily living and preventing him from exercising    Patient denies night  fever/night sweats, urinary incontinence, bowel incontinence, significant weight loss, significant motor weakness and loss of sensations.    Physical Therapy/Home Exercise: yes    Pain Disability Index Review:      9/14/2023     2:40 PM 5/18/2020     4:09 PM   Last 3 PDI Scores   Pain Disability Index (PDI) 43 5       Pain Medications:  None     report:  Reviewed and consistent with medication use as prescribed.    Pain Procedures:   7/22/2020- Right Si joint and GTB injection  4/7/2021- Right SI joint injection      Imaging:   MRI Lumbar Spine 8/1/2022:  COMPARISON:  12/17/2020     FINDINGS:  Note made of lumbarization of S1 with small S1-S2 disc.     Alignment: There is grade 1 anterolisthesis at L4-L5. There is levoconvex curvature of the lumbar spine.     Vertebrae: No fracture or marrow infiltrative process.  Note made of reactive marrow edema about the left L4-L5 facet joint.     Discs: Moderate disc height loss at L4-S1.  No evidence for discitis.     Cord: Conus terminates at L1 and appears unremarkable.  Cauda equina appears unremarkable.     Degenerative findings:     T12-L1: No spinal canal stenosis or neural foraminal narrowing.     L1-L2: No spinal canal stenosis or neural foraminal narrowing.     L2-L3: No spinal canal stenosis or neural foraminal narrowing.     L3-L4: Mild facet arthropathy.  No spinal canal stenosis or neural foraminal narrowing.     L4-L5: Uncovering of the intervertebral disc with circumferential bulge and severe facet arthropathy result in moderate spinal canal stenosis and mild right neural foraminal narrowing.     L5-S1: Circumferential disc bulge asymmetric to the left and moderate left facet arthropathy result in moderate left neural foraminal narrowing.  Disc material contacts the left L5 nerve root in the extraforaminal zone.     Paraspinal muscles & soft tissues: Unremarkable.     Sacroiliac joints exhibit mild degenerative changes with few nonspecific foci of marrow  edema.  No evidence for sacroiliitis.     Impression:     1. Multilevel degenerative changes of the lumbar spine detailed above.  Moderate spinal canal stenosis noted at L4-L5.  Moderate left neural foraminal narrowing noted at L5-S1.    Past Medical History:   Diagnosis Date    Degenerative joint disease (DJD) of lumbar spine     History of cold sores     Prostate cancer 2013    Sacroiliitis 04/07/2021    Squamous cell carcinoma 09/23/2019     Past Surgical History:   Procedure Laterality Date    ANTERIOR CRUCIATE LIGAMENT REPAIR      APPENDECTOMY      FINGER AMPUTATION Right 9/23/2019    Procedure: AMPUTATION, FINGER;  Surgeon: Luna Tirado MD;  Location: [facility];  Service: Orthopedics;  Laterality: Right;  regional MAC    FINGER MASS EXCISION Right 9/9/2019    Procedure: EXCISION, MASS, FINGER small finger resection;  Surgeon: Luna Tirado MD;  Location: [facility];  Service: Orthopedics;  Laterality: Right;  regional MAC    INJECTION OF JOINT Right 7/22/2020    Procedure: INJECTION RIGHT SI JOINT AND RIGHT PIRIFORMIS INJECTION;  Surgeon: Roosevelt Reddy MD;  Location: [facility];  Service: Pain Management;  Laterality: Right;  NEEDS CONSENT, URGENT    INJECTION OF JOINT Right 4/7/2021    Procedure: INJECTION, JOINT, SI;  Surgeon: Roosevelt Reddy MD;  Location: [facility];  Service: Pain Management;  Laterality: Right;    PROSTATECTOMY      radical    REMOVAL OF NAIL OF DIGIT Right 8/19/2019    Procedure: REMOVAL, NAIL, DIGIT right small finger;  Surgeon: Luna Tirado MD;  Location: [facility];  Service: Orthopedics;  Laterality: Right;     Social History     Socioeconomic History    Marital status:    Occupational History    Occupation: anesthesiologist   Tobacco Use    Smoking status: Never    Smokeless tobacco: Never   Substance and Sexual Activity    Alcohol use: Yes     Comment: social    Drug use: No    Sexual activity: Yes     Partners: Female   Social History  Narrative    Moved from [place] 3 weeks ago. Lives with wife.     Social Determinants of Health     Financial Resource Strain: Low Risk  (9/5/2023)    Overall Financial Resource Strain (CARDIA)     Difficulty of Paying Living Expenses: Not hard at all   Food Insecurity: No Food Insecurity (9/5/2023)    Hunger Vital Sign     Worried About Running Out of Food in the Last Year: Never true     Ran Out of Food in the Last Year: Never true   Transportation Needs: No Transportation Needs (9/5/2023)    PRAPARE - Transportation     Lack of Transportation (Medical): No     Lack of Transportation (Non-Medical): No   Physical Activity: Sufficiently Active (9/5/2023)    Exercise Vital Sign     Days of Exercise per Week: 5 days     Minutes of Exercise per Session: 30 min   Stress: Unknown (9/5/2023)    [employer] [place] - Occupational Stress Questionnaire     Feeling of Stress : Patient refused   Social Connections: Unknown (9/5/2023)    Social Connection and Isolation Panel [NHANES]     Frequency of Communication with Friends and Family: Patient refused     Frequency of Social Gatherings with Friends and Family: Patient refused     Active Member of Clubs or Organizations: No     Attends Club or Organization Meetings: Never     Marital Status:    Housing Stability: Unknown (9/5/2023)    Housing Stability Vital Sign     Unable to Pay for Housing in the Last Year: No     Unstable Housing in the Last Year: Patient refused     Family History   Problem Relation Age of Onset    No Known Problems Mother     Prostate cancer Father     Prostate cancer Maternal Uncle     Melanoma Neg Hx     Colon cancer Neg Hx        Review of patient's allergies indicates:  No Known Allergies    Current Outpatient Medications   Medication Sig    celecoxib (CELEBREX) 100 MG capsule TAKE 1 CAPSULE BY MOUTH TWICE A DAY (Patient not taking: Reported on 9/5/2023)     No current facility-administered medications for this visit.  "    Facility-Administered Medications Ordered in Other Visits   Medication    0.9%  NaCl infusion       REVIEW OF SYSTEMS:    GENERAL:  No weight loss, malaise or fevers.  HEENT:  Negative for frequent or significant headaches.  NECK:  Negative for lumps, goiter, pain and significant neck swelling.  RESPIRATORY:  Negative for cough, wheezing or shortness of breath.  CARDIOVASCULAR:  Negative for chest pain, leg swelling or palpitations.  GI:  Negative for abdominal discomfort, blood in stools or black stools or change in bowel habits.  MUSCULOSKELETAL:  See HPI.  SKIN:  Negative for lesions, rash, and itching.  PSYCH:  Positive for sleep disturbance, mood disorder and recent psychosocial stressors.  HEMATOLOGY/LYMPHOLOGY:  Negative for prolonged bleeding, bruising easily or swollen nodes. Hx of prostate cancer.   NEURO:   No history of headaches, syncope, paralysis, seizures or tremors.  All other reviewed and negative other than HPI.    OBJECTIVE:    /83 (BP Location: Right arm, Patient Position: Sitting, BP Method: Small (Automatic))   Pulse 66   Resp 18   Ht 5' 10" (1.778 m)   Wt 91.6 kg (201 lb 15.1 oz)   SpO2 100%   BMI 28.98 kg/m²     PHYSICAL EXAMINATION:    General appearance: Well appearing, in no acute distress, alert and oriented x3.  Psych:  Mood and affect appropriate.  Skin: Skin color, texture, turgor normal, no rashes or lesions, in both upper and lower body.  Head/face:  Normocephalic, atraumatic.   Cor: RRR  Pulm: Symmetric chest rise, no respiratory distress noted.   Back: Straight leg raising in the sitting position is negative to radicular pain bilaterally. There is mild pain with palpation over right lumbar facet joints. Full ROM with pain on flexion and extension.    Extremities: No deformities, edema, or skin discoloration. Good capillary refill.  Musculoskeletal: Bilateral lower extremity strength is normal and symmetric.  No atrophy or tone abnormalities are noted.  Neuro: " Bilateral lower extremity coordination and muscle stretch reflexes are physiologic and symmetric.  Plantar response are downgoing. No loss of sensation is noted.  Gait: normal.    ASSESSMENT: 68 y.o. year old male with low back pain, consistent with the followin. Lumbosacral radiculopathy  Procedure Order to Pain Management      2. DDD (degenerative disc disease), lumbar  Procedure Order to Pain Management              PLAN:     - Previous imaging was reviewed and discussed with the patient today.    - Schedule for right L4/5 and L5/S1 TF MARA.     - I have stressed the importance of physical activity and a home exercise plan to help with pain and improve health.    - RTC 2 weeks after above procedure.     The above plan and management options were discussed at length with patient. Patient is in agreement with the above and verbalized understanding.     Yamila Maher NP  2023

## 2023-09-16 ENCOUNTER — PATIENT MESSAGE (OUTPATIENT)
Dept: PAIN MEDICINE | Facility: OTHER | Age: 68
End: 2023-09-16
Payer: COMMERCIAL

## 2023-10-04 ENCOUNTER — HOSPITAL ENCOUNTER (OUTPATIENT)
Facility: OTHER | Age: 68
Discharge: HOME | End: 2023-10-04
Attending: ANESTHESIOLOGY | Admitting: ANESTHESIOLOGY
Payer: COMMERCIAL

## 2023-10-04 VITALS
RESPIRATION RATE: 16 BRPM | SYSTOLIC BLOOD PRESSURE: 152 MMHG | HEIGHT: 70 IN | BODY MASS INDEX: 28.63 KG/M2 | WEIGHT: 200 LBS | OXYGEN SATURATION: 96 % | TEMPERATURE: 98 F | DIASTOLIC BLOOD PRESSURE: 83 MMHG | HEART RATE: 65 BPM

## 2023-10-04 DIAGNOSIS — G89.29 CHRONIC PAIN: ICD-10-CM

## 2023-10-04 DIAGNOSIS — M54.16 LUMBAR RADICULOPATHY: Primary | ICD-10-CM

## 2023-10-04 PROCEDURE — 64483 NJX AA&/STRD TFRM EPI L/S 1: CPT | Mod: RT | Performed by: ANESTHESIOLOGY

## 2023-10-04 PROCEDURE — 25000003 PHARM REV CODE 250: Performed by: STUDENT IN AN ORGANIZED HEALTH CARE EDUCATION/TRAINING PROGRAM

## 2023-10-04 PROCEDURE — 64483 PR EPIDURAL INJ, ANES/STEROID, TRANSFORAMINAL, LUMB/SACR, SNGL LEVL: ICD-10-PCS | Mod: RT,,, | Performed by: ANESTHESIOLOGY

## 2023-10-04 PROCEDURE — 64484 NJX AA&/STRD TFRM EPI L/S EA: CPT | Mod: RT,,, | Performed by: ANESTHESIOLOGY

## 2023-10-04 PROCEDURE — 63600175 PHARM REV CODE 636 W HCPCS: Performed by: ANESTHESIOLOGY

## 2023-10-04 PROCEDURE — 25500020 PHARM REV CODE 255: Performed by: ANESTHESIOLOGY

## 2023-10-04 PROCEDURE — 64484 NJX AA&/STRD TFRM EPI L/S EA: CPT | Mod: RT | Performed by: ANESTHESIOLOGY

## 2023-10-04 PROCEDURE — 64484 PRA INJECT ANES/STEROID FORAMEN LUMBAR/SACRAL W IMG GUIDE ,EA ADD LEVEL: ICD-10-PCS | Mod: RT,,, | Performed by: ANESTHESIOLOGY

## 2023-10-04 PROCEDURE — 64483 NJX AA&/STRD TFRM EPI L/S 1: CPT | Mod: RT,,, | Performed by: ANESTHESIOLOGY

## 2023-10-04 PROCEDURE — 25000003 PHARM REV CODE 250: Performed by: ANESTHESIOLOGY

## 2023-10-04 RX ORDER — LIDOCAINE HYDROCHLORIDE 10 MG/ML
INJECTION, SOLUTION EPIDURAL; INFILTRATION; INTRACAUDAL; PERINEURAL
Status: DISCONTINUED | OUTPATIENT
Start: 2023-10-04 | End: 2023-10-04 | Stop reason: HOSPADM

## 2023-10-04 RX ORDER — MIDAZOLAM HYDROCHLORIDE 1 MG/ML
INJECTION INTRAMUSCULAR; INTRAVENOUS
Status: DISCONTINUED | OUTPATIENT
Start: 2023-10-04 | End: 2023-10-04 | Stop reason: HOSPADM

## 2023-10-04 RX ORDER — FENTANYL CITRATE 50 UG/ML
INJECTION, SOLUTION INTRAMUSCULAR; INTRAVENOUS
Status: DISCONTINUED | OUTPATIENT
Start: 2023-10-04 | End: 2023-10-04 | Stop reason: HOSPADM

## 2023-10-04 RX ORDER — LIDOCAINE HYDROCHLORIDE 20 MG/ML
INJECTION, SOLUTION INFILTRATION; PERINEURAL
Status: DISCONTINUED | OUTPATIENT
Start: 2023-10-04 | End: 2023-10-04 | Stop reason: HOSPADM

## 2023-10-04 RX ORDER — DEXAMETHASONE SODIUM PHOSPHATE 10 MG/ML
INJECTION INTRAMUSCULAR; INTRAVENOUS
Status: DISCONTINUED | OUTPATIENT
Start: 2023-10-04 | End: 2023-10-04 | Stop reason: HOSPADM

## 2023-10-04 RX ORDER — SODIUM CHLORIDE 9 MG/ML
INJECTION, SOLUTION INTRAVENOUS CONTINUOUS
Status: DISCONTINUED | OUTPATIENT
Start: 2023-10-04 | End: 2023-10-04 | Stop reason: HOSPADM

## 2023-10-04 NOTE — DISCHARGE INSTRUCTIONS

## 2023-10-04 NOTE — OP NOTE
Lumbar Transforaminal Epidural Steroid Injection under Fluoroscopic Guidance    The procedure, risks, benefits, and options were discussed with the patient. There are no contraindications to the procedure. The patent expressed understanding and agreed to the procedure. Informed written consent was obtained prior to the start of the procedure and can be found in the patient's chart.    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   MRN: [id number]     DATE OF PROCEDURE: 10/04/2023    PROCEDURE:  Right  L4/5 and L5/S1 Lumbar Transforaminal Epidural Steroid Injection under Fluoroscopic Guidance    PRE-OP DIAGNOSIS:   Lumbosacral radiculopathy [M54.17]   DDD    POST-OP DIAGNOSIS: Same    PHYSICIAN: Roosevelt Reddy MD    ASSISTANTS: Philip Waldrop MD     MEDICATIONS INJECTED: Preservative-free Decadron 10mg with 5cc of Lidocaine 1% MPF     LOCAL ANESTHETIC INJECTED: Xylocaine 2%     SEDATION: Versed 2mg and Fentanyl 50mcg                                                                                                                                                                                     Conscious sedation ordered by M.TRAV Patient re-evaluation prior to administration of conscious sedation. No changes noted in patient's status from initial evaluation. The patient's vital signs were monitored by RN and patient remained hemodynamically stable throughout the procedure.    Event Time In   Sedation Start 1525   Sedation End 1529       ESTIMATED BLOOD LOSS: None    COMPLICATIONS: None    TECHNIQUE: Time-out was performed to identify the patient and procedure to be performed. With the patient laying in a prone position, the surgical area was prepped and draped in the usual sterile fashion using ChloraPrep and a fenestrated drape.The levels were determined under fluoroscopy guidance. Skin anesthesia was achieved by injecting Lidocaine 2% over the injection sites. The transforaminal spaces were then approached with a 22 gauge, 3.5  inch spinal quinke needle that was introduced under fluoroscopic guidance in the AP and Lateral views. Once the needle tip was in the area of the transforaminal space, and there was no blood, CSF or paraesthesias, contrast dye Omnipaque (300mg/mL) was injected to confirm placement and there was no vascular runoff. Fluoroscopic imaging in the AP and lateral views revealed a clear outline of the spinal nerve with proximal spread of agent through the neural foramen into the epidural space. 3 mL of the medication mixture listed above was injected slowly at each site. Displacement of the radio opaque contrast after injection of the medication confirmed that the medication went into the area of the transforaminal spaces. The needles were removed and bleeding was nil. A sterile dressing was applied. No specimens collected. The patient tolerated the procedure well.       The patient was monitored after the procedure in the recovery area. They were given post-procedure and discharge instructions to follow at home. The patient was discharged in a stable condition.    Philip Waldrop MD    I reviewed and edited the fellow's note. I conducted my own interview and physical examination. I agree with the findings. I was present and supervising all critical portions of the procedure.    Roosevelt Reddy MD

## 2023-10-04 NOTE — DISCHARGE SUMMARY
Discharge Note  Short Stay      SUMMARY     Admit Date: 10/4/2023    Attending Physician: Roosevelt Reddy    Discharge Physician: Philip Waldrop      Discharge Date: 10/4/2023 3:29 PM    Procedure(s) (LRB):  INJECTION, STEROID, EPIDURAL, TRANSFORAMINAL APPROACH, RIGHT L4/L5 AND L5/S1 (Right)    Final Diagnosis: Lumbosacral radiculopathy [M54.17]    Disposition: Home or self care    Patient Instructions:   Current Discharge Medication List        CONTINUE these medications which have NOT CHANGED    Details   celecoxib (CELEBREX) 100 MG capsule TAKE 1 CAPSULE BY MOUTH TWICE A DAY  Qty: 60 capsule, Refills: 0                 Discharge Diagnosis: Lumbosacral radiculopathy [M54.17]  Condition on Discharge: Stable with no complications to procedure   Diet on Discharge: Same as before.  Activity: as per instruction sheet.  Discharge to: Home with a responsible adult.  Follow up: 2-4 weeks       Please call my office or pager at [phone] if experienced any weakness or loss of sensation, fever > 101.5, pain uncontrolled with oral medications, persistent nausea/vomiting/or diarrhea, redness or drainage from the incisions, or any other worrisome concerns. If physician on call was not reached or could not communicate with our office for any reason please go to the nearest emergency department        Roosevelt Reddy

## 2023-11-27 DIAGNOSIS — M43.10 SPONDYLOLISTHESIS, ACQUIRED: Primary | ICD-10-CM

## 2023-11-29 ENCOUNTER — LAB VISIT (OUTPATIENT)
Dept: LAB | Facility: HOSPITAL | Age: 68
End: 2023-11-29
Attending: UROLOGY
Payer: COMMERCIAL

## 2023-11-29 DIAGNOSIS — Z85.46 HISTORY OF PROSTATE CANCER: ICD-10-CM

## 2023-11-29 DIAGNOSIS — N39.3 MALE STRESS INCONTINENCE: ICD-10-CM

## 2023-11-29 LAB — COMPLEXED PSA SERPL-MCNC: 0.05 NG/ML (ref 0–4)

## 2023-11-29 PROCEDURE — 36415 COLL VENOUS BLD VENIPUNCTURE: CPT | Performed by: UROLOGY

## 2023-11-29 PROCEDURE — 84153 ASSAY OF PSA TOTAL: CPT | Performed by: UROLOGY

## 2023-12-21 ENCOUNTER — CLINICAL SUPPORT (OUTPATIENT)
Dept: REHABILITATION | Facility: HOSPITAL | Age: 68
End: 2023-12-21
Attending: ORTHOPAEDIC SURGERY
Payer: COMMERCIAL

## 2023-12-21 DIAGNOSIS — M43.10 SPONDYLOLISTHESIS, ACQUIRED: ICD-10-CM

## 2023-12-21 DIAGNOSIS — M53.2X7 SPINAL INSTABILITY, LUMBOSACRAL: Primary | ICD-10-CM

## 2023-12-21 PROCEDURE — 97112 NEUROMUSCULAR REEDUCATION: CPT | Mod: PO

## 2023-12-21 PROCEDURE — 97161 PT EVAL LOW COMPLEX 20 MIN: CPT | Mod: PO

## 2023-12-21 NOTE — PLAN OF CARE
"[facility]   Physical Therapy Initial Evaluation      Name: Juan Luis Mcgrath  Clinic Number: [id number]    Therapy Diagnosis:   Encounter Diagnoses   Name Primary?    Spondylolisthesis, acquired     Spinal instability, lumbosacral Yes        Physician: Alexandre Garcia MD    Physician Orders: PT Eval and Treat   Note: Lumbar protocol, modalities, HEP   Medical Diagnosis from Referral: Spondylolisthesis, acquired   Evaluation Date: 12/21/2023  Authorization Period Expiration: 12/30/23  Plan of Care Expiration: 2/1/24  Progress Note Due: 1/21/24  Date of Surgery: n/a  Visit # / Visits authorized: 1/ 1   FOTO: 1/ 3    Precautions: Standard     Time In: 308 PM  Time Out: 3:58 PM  Total Billable Time: 50 minutes    Subjective     Date of onset: 2 years     History of current condition - Juan Luis reports: has had back pain for 2 years that started from a movement in his desk at work. He recently had an injection in the back in October, which did not really help with the pain- maybe helped for a week max. The pain starts in the right hip and goes down to the right leg. It is not nerve pain, no numbness, no tingling. The pain is more dull and starts in the SI joint area around the glutes.    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)   Prior Level of Function: independent   Current Level of Function: independent -but limited with running, lifting heavy objects, and mostly standing     Pain:  Current 2/10, worst 9/10, best 0/10   Location: right SI hip and right leg   Description: Aching  Aggravating Factors: Standing  Easing Factors: laying down, rest, aleve , tylenol    Patients goals:" to see what exercises I can do that will help alleviate the pain"     Medical History:   Past Medical History:   Diagnosis Date    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    Medications:   Juan Luis has a current medication list which includes the following prescription(s): celecoxib, and the following Facility-Administered Medications: sodium chloride 0.9%.    Allergies:   Review of patient's allergies indicates:  No Known Allergies     Objective      Observation: pt enters clinic independently    Posture: right hip higher in standing, anterior pelvic tilt in standing position    Lumbar ROM: %AROM   % Pain   Flexion   75 No    Extension   50 no   Left Side Bending 75 no   Right Side Bending 75 no   Left rotation 75 no   Right Rotation 75 no         Lower Extremity Strength (graded 0-5 out of 5)   RLE LLE   Hip flexion: 5/5 4+/5   Knee extension: 5/5 4+/5   Ankle dorsiflexion: 5/5 5/5   Posterior fibers of Gluteus medius 4+/5 4+/5   Knee flexion 5/5 5/5   Hip extension: 4+/5 4+/5     Special Tests: ((+): pos.; (-): neg.)   Slump Test: -  SLR Test: - but with right glute pain with right leg testing    Palpation for condition:minimal tenderness to glute medius on the right side         Trude Leg length discrepancy measurement: from ASIS to medial malleolus on each leg   R: 36.5 in  L 36.2 in  Corrected with MET (pelvic push/pull MET). Pt shows equal leg length measurement after MET.    Intake Outcome Measure for FOTO  Survey    Therapist reviewed FOTO scores for Juan Luis Mcgrath on 12/21/2023.   FOTO report - see Media section  or FOTO account episode details  Intake Score: 57         Treatment     Total Treatment time (time-based codes) separate from Evaluation: 20 minutes     Juan Luis received the treatments listed below:        neuromuscular re-education activities to improve: Posture and core activation for 20 minutes. The following activities were included:  Standing pelvic tilt - standing and against wall  Hooklying abdominal bracing   Education on standing posture at work and home         Patient Education and Home Exercises     Education provided:   - PT POC  - HEP  - transverse abdominus  vs rectus abdominus and appropriate core engagement and abdominal bracing  - postural positioning with pelvis in neutral vs anterior pelvic tilt       Written Home Exercises Provided: yes. Exercises were reviewed and Juan Luis was able to demonstrate them prior to the end of the session.  Juan Luis demonstrated good  understanding of the education provided. See EMR under Patient Instructions for exercises provided during therapy sessions.    Assessment     Juan Luis is a 68 y.o. male referred to outpatient Physical Therapy with a medical diagnosis of Spondylolisthesis, acquired . Patient presents with pain with prolonged standing. This is patient's chief complaint at this time. Standing postural analysis shows pt with anterior pelvic tilt in standing with right hip higher. Upon cuing patient to perform posterior pelvic tilt in standing, pt endorses decreased pain. He is presenting with lack of functional muscle engagement and weakness throughout the lumbopelvic complex. He is educated on standing posterior pelvic tilts and hooklying abdominal bracing, and patient is able to properly fire these muscles. He will benefit from dynamic core, hip and pelvic stability activities to decrease pain with functional standing and activities.     Patient prognosis is Good.   Patient will benefit from skilled outpatient Physical Therapy to address the deficits stated above and in  the chart below, provide patient /family education, and to maximize patientt's level of independence.     Plan of care discussed with patient: Yes  Patient's spiritual, cultural and educational needs considered and patient is agreeable to the plan of care and goals as stated below:     Anticipated Barriers for therapy: none    Medical Necessity is demonstrated by the following  History  Co-morbidities and personal factors that may impact the plan of care [x] LOW: no personal factors / co-morbidities  [] MODERATE: 1-2 personal factors / co-morbidities  [] HIGH: 3+ personal factors / co-morbidities    Moderate / High Support Documentation:   Co-morbidities affecting plan of care:     Personal Factors:   no deficits     Examination  Body Structures and Functions, activity limitations and participation restrictions that may impact the plan of care [x] LOW: addressing 1-2 elements  [] MODERATE: 3+ elements  [] HIGH: 4+ elements (please support below)    Moderate / High Support Documentation:      Clinical Presentation [x] LOW: stable  [] MODERATE: Evolving  [] HIGH: Unstable     Decision Making/ Complexity Score: low       Goals:  Short Term Goals: 3 weeks   Pt to endorse 50% improvement in ability to stand at work to improve function  Pt will be independent in standing postural corrections to decrease pain with standing   Pt to be independent in ability to  engage his abdominal muscles with functional activities     Long Term Goals: 6 weeks   Pt to endorse 75% improvement in ability to stand at work to improve overall function  Patient will be able to climb >/=2 flights of stairs without pain increasing   Pt to be independent with HEP in order to maintain all therapeutic gains upon discharge    Plan     Plan of care Certification: 12/21/2023 to 2/1/24.    Outpatient Physical Therapy 1 times weekly for 6 weeks to include the following interventions: Cervical/Lumbar Traction, Manual Therapy, Moist Heat/ Ice, Neuromuscular  Re-ed, Patient Education, Self Care, Therapeutic Activities, Therapeutic Exercise, and modalities as appropriate.       Assess hip flexors (peyton test) and gait at next visit.       Jeannette Juarez, PT, DPT         Physician's Signature: _________________________________________ Date: ________________

## 2024-01-04 ENCOUNTER — CLINICAL SUPPORT (OUTPATIENT)
Dept: REHABILITATION | Facility: HOSPITAL | Age: 69
End: 2024-01-04
Payer: COMMERCIAL

## 2024-01-04 DIAGNOSIS — M53.2X7 SPINAL INSTABILITY, LUMBOSACRAL: ICD-10-CM

## 2024-01-04 DIAGNOSIS — M43.10 SPONDYLOLISTHESIS, ACQUIRED: Primary | ICD-10-CM

## 2024-01-04 PROCEDURE — 97112 NEUROMUSCULAR REEDUCATION: CPT | Mod: PO

## 2024-01-04 PROCEDURE — 97110 THERAPEUTIC EXERCISES: CPT | Mod: PO

## 2024-01-04 NOTE — PROGRESS NOTES
"[facility]   Physical Therapy Treatment Note      Name: Juan Luis Mcgrath  Clinic Number: [id number]    Therapy Diagnosis:   Encounter Diagnoses   Name Primary?    Spondylolisthesis, acquired Yes    Spinal instability, lumbosacral      Physician: Alexandre Garcia MD    Visit Date: 1/4/2024    Physician Orders: PT Eval and Treat   Note: Lumbar protocol, modalities, HEP   Medical Diagnosis from Referral: Spondylolisthesis, acquired   Evaluation Date: 12/21/2023  Authorization Period Expiration: 12/30/23  Plan of Care Expiration: 2/1/24  Progress Note Due: 1/21/24  Date of Surgery: n/a  Visit # / Visits authorized: 1/ 20  FOTO: 1/ 3     Precautions: Standard      Time In: 1005 AM  Time Out: 1055 AM  Total Billable Time: 55 minutes    PTA Visit #: 0/5       Subjective     Patient reports: that his pain is unchanging since last seen at his Initial Evaluation .  He was compliant with home exercise program.  Response to previous treatment: ongoing  Functional change: none    Pain: no numerical value reported/10  Location:  R hip     Objective      Objective Measures updated at progress report unless specified.     Treatment     Juan Luis received the treatments listed below:      therapeutic exercises to develop strength, endurance, ROM, flexibility, posture, and core stabilization for 30 minutes including:  Standing pelvic tilt - standing and against wall  +Clamshells (neutral hip) 20x3" TIFF, RTB  +standing resisted hip flexion 2x10x2" hold TIFF    Discussed presentation/posture, prognosis, gait mechanics    manual therapy techniques: Joint mobilizations, Manual traction, Myofacial release, and Soft tissue Mobilization were applied to the: - for 0 minutes, including:      neuromuscular re-education activities to improve: Balance, Coordination, Kinesthetic, Sense, Proprioception, and Posture for 25 minutes. The following activities were included:  +Posterior pelvic tilt 12x5"   +Bridge 2x10 (3 " "second pace up/down ea)  +90/90 hip flexion isometrics 10x10"  +isometric hip adduction (ball squeeze @ ankle) 15x5"      Patient Education and Home Exercises       Education provided:   - importance of core and pelvic stability, HEP    Written Home Exercises Provided: Patient instructed to cont prior HEP. Exercises were reviewed and Juan Luis was able to demonstrate them prior to the end of the session.  Juan Luis demonstrated good  understanding of the education provided. See Electronic Medical Record under Patient Instructions for exercises provided during therapy sessions    Assessment     Mr. Mcknight arrived to clinic reporting no changes in status since last seen. Treatment focused on posterior chain engagement and pelvic/core stability. Pt required cueing for technique and breathing techniques to prevent valsalva maneuver. Resisted hip flexion triggered discomfort in R hip at completion of exercise. Pt mentioned that he has noticed changes in his gait since his pain onset. Today PT observed L hip drop, L out-toeing and R trunk shift during gait. Discussed with patient about resting posture and becoming more aware of pelvic positioning in standing and abdominal bracing during walks to help facilitate posterior pelvic tilt and establish this as a positive habit. Concluded session with no adverse effects but some reports of discomfort on the R.    Juan Luis Is progressing well towards his goals.   Patient prognosis is Good.     Patient will continue to benefit from skilled outpatient physical therapy to address the deficits listed in the problem list box on initial evaluation, provide pt/family education and to maximize pt's level of independence in the home and community environment.     Patient's spiritual, cultural and educational needs considered and pt agreeable to plan of care and goals.     Anticipated barriers to physical therapy: None    Goals:   Short Term Goals: 3 weeks   Pt to endorse 50% improvement in ability to " stand at work to improve function  Pt will be independent in standing postural corrections to decrease pain with standing   Pt to be independent in ability to  engage his abdominal muscles with functional activities      Long Term Goals: 6 weeks   Pt to endorse 75% improvement in ability to stand at work to improve overall function  Patient will be able to climb >/=2 flights of stairs without pain increasing   Pt to be independent with HEP in order to maintain all therapeutic gains upon discharge    Plan     Continue per PT POC. Progress patient as tolerated. Make sure that patient is able to engage core and proper breathing technique with all exercises.    Shelby Santamaria, PT

## 2024-01-11 ENCOUNTER — CLINICAL SUPPORT (OUTPATIENT)
Dept: REHABILITATION | Facility: HOSPITAL | Age: 69
End: 2024-01-11
Payer: COMMERCIAL

## 2024-01-11 DIAGNOSIS — M53.2X7 SPINAL INSTABILITY, LUMBOSACRAL: Primary | ICD-10-CM

## 2024-01-11 DIAGNOSIS — M43.10 SPONDYLOLISTHESIS, ACQUIRED: ICD-10-CM

## 2024-01-11 PROCEDURE — 97112 NEUROMUSCULAR REEDUCATION: CPT | Mod: PO

## 2024-01-11 PROCEDURE — 97110 THERAPEUTIC EXERCISES: CPT | Mod: PO

## 2024-01-11 NOTE — PROGRESS NOTES
"[facility]   Physical Therapy Treatment Note      Name: Juan Luis Mcgrath  Clinic Number: [id number]    Therapy Diagnosis:   Encounter Diagnoses   Name Primary?    Spinal instability, lumbosacral Yes    Spondylolisthesis, acquired        Physician: Alexandre Garcia MD    Visit Date: 1/11/2024    Physician Orders: PT Eval and Treat   Note: Lumbar protocol, modalities, HEP   Medical Diagnosis from Referral: Spondylolisthesis, acquired   Evaluation Date: 12/21/2023  Authorization Period Expiration: 12/30/23  Plan of Care Expiration: 2/1/24  Progress Note Due: 1/21/24  Date of Surgery: n/a  Visit # / Visits authorized: 2/ 20  FOTO: 1/ 3     Precautions: Standard      Time In: 1010 AM  Time Out: 1055AM  Total Billable Time: 45 minutes    PTA Visit #: 0/5       Subjective     Patient reports: home exercise program going well, pain very low today  He was compliant with home exercise program.  Response to previous treatment: ongoing  Functional change: none    Pain: no numerical value reported/10  Location:  R hip     Objective      Objective Measures updated at progress report unless specified.     Treatment     Juan Luis received the treatments listed below:      therapeutic exercises to develop strength, endurance, ROM, flexibility, posture, and core stabilization for 15 minutes including:  Standing pelvic tilt - standing and against wall - 20 X 5 second hold  Clamshells (neutral hip) 20x3" TIFF, RTB  +standing resisted hip flexion 2x10x2" hold TIFF    Discussed presentation/posture, prognosis, gait mechanics    manual therapy techniques: Joint mobilizations, Manual traction, Myofacial release, and Soft tissue Mobilization were applied to the: - for 0 minutes, including:      neuromuscular re-education activities to improve: Balance, Coordination, Kinesthetic, Sense, Proprioception, and Posture for 35 minutes. The following activities were included:  Posterior pelvic tilt 20 X 3 sec hold  Bridge " "2x10 (3 second pace up/down ea) - RTB  Transverse abdominis Presses into swiss ball 20 X 3 sec hold  LE extension with ball on stomach 2 X 10 each side  10X bird dog opp UE/LE with swiss ball  +90/90 hip flexion isometrics 10x10"  isometric hip adduction (ball squeeze @ ankle) 15x5"  Palloff press RTB 2 X 15 each direction      Patient Education and Home Exercises       Education provided:   - importance of core and pelvic stability, HEP    Written Home Exercises Provided: Patient instructed to cont prior HEP. Exercises were reviewed and Juan Luis was able to demonstrate them prior to the end of the session.  Juan Luis demonstrated good  understanding of the education provided. See Electronic Medical Record under Patient Instructions for exercises provided during therapy sessions    Assessment     Mr. Mcknight did very well with session focusing on core stabilization and education on core strengthening progressions in order to increase in standing tolerance and core engagement. Home exercise program given to focus on hip strengthening while at home and progressions for gym activity. Continue as able    Juan Luis Is progressing well towards his goals.   Patient prognosis is Good.     Patient will continue to benefit from skilled outpatient physical therapy to address the deficits listed in the problem list box on initial evaluation, provide pt/family education and to maximize pt's level of independence in the home and community environment.     Patient's spiritual, cultural and educational needs considered and pt agreeable to plan of care and goals.     Anticipated barriers to physical therapy: None    Goals:   Short Term Goals: 3 weeks   Pt to endorse 50% improvement in ability to stand at work to improve function  Pt will be independent in standing postural corrections to decrease pain with standing   Pt to be independent in ability to  engage his abdominal muscles with functional activities      Long Term Goals: 6 weeks   Pt to " endorse 75% improvement in ability to stand at work to improve overall function  Patient will be able to climb >/=2 flights of stairs without pain increasing   Pt to be independent with HEP in order to maintain all therapeutic gains upon discharge    Plan     Continue per PT POC. Progress patient as tolerated. Make sure that patient is able to engage core and proper breathing technique with all exercises.    Veronica Dudley, PT

## 2024-03-28 DIAGNOSIS — Z85.46 HISTORY OF PROSTATE CANCER: ICD-10-CM

## 2024-03-28 DIAGNOSIS — Z00.00 ROUTINE GENERAL MEDICAL EXAMINATION AT A HEALTH CARE FACILITY: Primary | ICD-10-CM

## 2024-04-02 ENCOUNTER — LAB VISIT (OUTPATIENT)
Dept: LAB | Facility: HOSPITAL | Age: 69
End: 2024-04-02
Payer: COMMERCIAL

## 2024-04-02 DIAGNOSIS — Z85.46 HISTORY OF PROSTATE CANCER: ICD-10-CM

## 2024-04-02 DIAGNOSIS — Z00.00 ROUTINE GENERAL MEDICAL EXAMINATION AT A HEALTH CARE FACILITY: ICD-10-CM

## 2024-04-02 LAB
ALBUMIN SERPL BCP-MCNC: 3.9 G/DL (ref 3.5–5.2)
ALP SERPL-CCNC: 62 U/L (ref 55–135)
ALT SERPL W/O P-5'-P-CCNC: 24 U/L (ref 10–44)
ANION GAP SERPL CALC-SCNC: 6 MMOL/L (ref 8–16)
AST SERPL-CCNC: 25 U/L (ref 10–40)
BILIRUB SERPL-MCNC: 0.7 MG/DL (ref 0.1–1)
BUN SERPL-MCNC: 12 MG/DL (ref 8–23)
CALCIUM SERPL-MCNC: 9.3 MG/DL (ref 8.7–10.5)
CHLORIDE SERPL-SCNC: 108 MMOL/L (ref 95–110)
CHOLEST SERPL-MCNC: 198 MG/DL (ref 120–199)
CHOLEST/HDLC SERPL: 3.5 {RATIO} (ref 2–5)
CO2 SERPL-SCNC: 26 MMOL/L (ref 23–29)
COMPLEXED PSA SERPL-MCNC: 0.06 NG/ML (ref 0–4)
CREAT SERPL-MCNC: 1 MG/DL (ref 0.5–1.4)
ERYTHROCYTE [DISTWIDTH] IN BLOOD BY AUTOMATED COUNT: 13.2 % (ref 11.5–14.5)
EST. GFR  (NO RACE VARIABLE): >60 ML/MIN/1.73 M^2
ESTIMATED AVG GLUCOSE: 105 MG/DL (ref 68–131)
GLUCOSE SERPL-MCNC: 99 MG/DL (ref 70–110)
HBA1C MFR BLD: 5.3 % (ref 4–5.6)
HCT VFR BLD AUTO: 45.3 % (ref 40–54)
HDLC SERPL-MCNC: 57 MG/DL (ref 40–75)
HDLC SERPL: 28.8 % (ref 20–50)
HGB BLD-MCNC: 15.3 G/DL (ref 14–18)
LDLC SERPL CALC-MCNC: 122.6 MG/DL (ref 63–159)
MCH RBC QN AUTO: 31.6 PG (ref 27–31)
MCHC RBC AUTO-ENTMCNC: 33.8 G/DL (ref 32–36)
MCV RBC AUTO: 94 FL (ref 82–98)
NONHDLC SERPL-MCNC: 141 MG/DL
PLATELET # BLD AUTO: 252 K/UL (ref 150–450)
PMV BLD AUTO: 9.8 FL (ref 9.2–12.9)
POTASSIUM SERPL-SCNC: 4.4 MMOL/L (ref 3.5–5.1)
PROT SERPL-MCNC: 6.7 G/DL (ref 6–8.4)
RBC # BLD AUTO: 4.84 M/UL (ref 4.6–6.2)
SODIUM SERPL-SCNC: 140 MMOL/L (ref 136–145)
TRIGL SERPL-MCNC: 92 MG/DL (ref 30–150)
TSH SERPL DL<=0.005 MIU/L-ACNC: 1.92 UIU/ML (ref 0.4–4)
WBC # BLD AUTO: 4.86 K/UL (ref 3.9–12.7)

## 2024-04-02 PROCEDURE — 80061 LIPID PANEL: CPT | Performed by: EMERGENCY MEDICINE

## 2024-04-02 PROCEDURE — 84153 ASSAY OF PSA TOTAL: CPT | Performed by: EMERGENCY MEDICINE

## 2024-04-02 PROCEDURE — 80053 COMPREHEN METABOLIC PANEL: CPT | Performed by: EMERGENCY MEDICINE

## 2024-04-02 PROCEDURE — 36415 COLL VENOUS BLD VENIPUNCTURE: CPT | Performed by: EMERGENCY MEDICINE

## 2024-04-02 PROCEDURE — 84443 ASSAY THYROID STIM HORMONE: CPT | Performed by: EMERGENCY MEDICINE

## 2024-04-02 PROCEDURE — 85027 COMPLETE CBC AUTOMATED: CPT | Performed by: EMERGENCY MEDICINE

## 2024-04-02 PROCEDURE — 83036 HEMOGLOBIN GLYCOSYLATED A1C: CPT | Performed by: EMERGENCY MEDICINE

## 2024-04-08 ENCOUNTER — OFFICE VISIT (OUTPATIENT)
Dept: INTERNAL MEDICINE | Facility: CLINIC | Age: 69
End: 2024-04-08
Payer: COMMERCIAL

## 2024-04-08 VITALS
SYSTOLIC BLOOD PRESSURE: 106 MMHG | HEIGHT: 70 IN | DIASTOLIC BLOOD PRESSURE: 70 MMHG | HEART RATE: 66 BPM | OXYGEN SATURATION: 98 % | BODY MASS INDEX: 28.27 KG/M2 | WEIGHT: 197.44 LBS

## 2024-04-08 DIAGNOSIS — G89.29 CHRONIC BACK PAIN, UNSPECIFIED BACK LOCATION, UNSPECIFIED BACK PAIN LATERALITY: ICD-10-CM

## 2024-04-08 DIAGNOSIS — M19.90 OSTEOARTHRITIS, UNSPECIFIED OSTEOARTHRITIS TYPE, UNSPECIFIED SITE: ICD-10-CM

## 2024-04-08 DIAGNOSIS — Z00.00 ENCOUNTER FOR SCREENING AND PREVENTATIVE CARE: Primary | ICD-10-CM

## 2024-04-08 DIAGNOSIS — M54.9 CHRONIC BACK PAIN, UNSPECIFIED BACK LOCATION, UNSPECIFIED BACK PAIN LATERALITY: ICD-10-CM

## 2024-04-08 DIAGNOSIS — Z85.46 HISTORY OF PROSTATE CANCER: ICD-10-CM

## 2024-04-08 PROCEDURE — 3074F SYST BP LT 130 MM HG: CPT | Mod: CPTII,S$GLB,, | Performed by: EMERGENCY MEDICINE

## 2024-04-08 PROCEDURE — 3078F DIAST BP <80 MM HG: CPT | Mod: CPTII,S$GLB,, | Performed by: EMERGENCY MEDICINE

## 2024-04-08 PROCEDURE — 99397 PER PM REEVAL EST PAT 65+ YR: CPT | Mod: S$GLB,,, | Performed by: EMERGENCY MEDICINE

## 2024-04-08 PROCEDURE — 1159F MED LIST DOCD IN RCRD: CPT | Mod: CPTII,S$GLB,, | Performed by: EMERGENCY MEDICINE

## 2024-04-08 PROCEDURE — 3288F FALL RISK ASSESSMENT DOCD: CPT | Mod: CPTII,S$GLB,, | Performed by: EMERGENCY MEDICINE

## 2024-04-08 PROCEDURE — 1101F PT FALLS ASSESS-DOCD LE1/YR: CPT | Mod: CPTII,S$GLB,, | Performed by: EMERGENCY MEDICINE

## 2024-04-08 PROCEDURE — 3044F HG A1C LEVEL LT 7.0%: CPT | Mod: CPTII,S$GLB,, | Performed by: EMERGENCY MEDICINE

## 2024-04-08 PROCEDURE — 99999 PR PBB SHADOW E&M-EST. PATIENT-LVL IV: CPT | Mod: PBBFAC,,, | Performed by: EMERGENCY MEDICINE

## 2024-04-08 PROCEDURE — 1126F AMNT PAIN NOTED NONE PRSNT: CPT | Mod: CPTII,S$GLB,, | Performed by: EMERGENCY MEDICINE

## 2024-04-08 PROCEDURE — 3008F BODY MASS INDEX DOCD: CPT | Mod: CPTII,S$GLB,, | Performed by: EMERGENCY MEDICINE

## 2024-04-08 RX ORDER — SILDENAFIL 50 MG/1
50 TABLET, FILM COATED ORAL
COMMUNITY
Start: 2023-12-21

## 2024-04-08 RX ORDER — MUPIROCIN 20 MG/G
OINTMENT TOPICAL
COMMUNITY
Start: 2024-03-10 | End: 2024-04-08

## 2024-04-08 NOTE — PROGRESS NOTES
"        [facility]      TODAY'S Date 4/8/2024  Patient ID: Juan Luis Mcgrath is a 68 y.o. male   MRN: [id number]  Primary Care Physician (PCP):  Unable, To Obtain    SUBJECTIVE     Chief Complaint:    Patient presents with    [facility]     HPI:   Reviewed medical, surgical, social and family history, medications, appropriate preventive health screenings, as well as vaccination history. Updates as noted below or in assessment and plan.    This is a very pleasant 68 y.o. nonsmoking male with PMHx chronic lower back pain secondary to DJD of lumbar spine, sacrolitilitis, SCC s/p resection, prostate cancer s/p resection. He is a new patient to me, presenting for an annual exam in [facility]. The patient is currently in good health, with the exception of intermittent moderate lower back pain that radiates to the right.  Denies any recent trauma, problems urination or bowels, abdominal pain. Reports some improvement with aleve, tylenol and stretching. States he does not feel that his sacroiliitis is a problem and attributes it to his pain to spondylosis. The patient would like to try transforminal injection for pain control. He is able to lift weights and work as pediatric anesthesiologist but does not do aerobic exercise.  Although he does do prework out stretches, the patient does not follow any formal flexibility routine at the current time.   He and his wife of nearly 16 years share a 13 year old daughter.  In his rare free time, he enjoys going out of town to his second home to relax.  In terms of diet, the patient typically skips breakfast. Lunch is "whatever is in the cafeteria"  and dinner is a home-cooked meal but may also be eaten out at places like [employer]. The patient snacks on nuts, drinks 24 oz of water daily, with occasional consumption of Diet Coke.  He stopped drinking alcohol in Nov 2023. The patient has a purchased a number of supplements such as " MVI, [place] 3, Men's Health but has not consistently taken them.  There is no report of hearing loss, tinnitus, noise exposure, cough, sneezing, dizziness, tingling, clumsiness, sensory deficit, recent trauma, fever/chills, n/v/d/c, abdominal pain, IV drug use, muscle aches, or loss of bowel or bladder control.     SCREENING:   DEXA:     N/a as < 70 years old and no clinical risk factors for fracture independent of bone mineral density   Prostate:    S/p resection.   Colonoscopy:    UTD, last April 2023  Component Ref Range & Units   Fecal Immunochemical Test (iFOBT) Negative      Depression:    negative   Anxiety:    negative  Eye Exam:    UTD, wears corrective lens  Dental Exam:    Routine q 6 months   Ear Exam:    No complaints  Skin:     rarely uses sunscreen. Negative for recent sunburn.   Sex:     Monogamous relationship  Transportation safety:   Uses restraint consistently, does not drink alcohol before or while driving  Firearm safety:   discussed  Tobacco use:    None reported    A review of medical records indicates Specialists:   Pain Medicine - Yamila Maher, NP  Urology - Sang Kelly MD and Rodo Renee MD  for prostate cancer  Podiatry - Campbell See, DPBERT  Optometry - Jessica Woods OD  Orthopedic Surgery - Alexandre Garcia MD  Sports Medicine - LAUREN Chambers MD  Dermatology  - Luis Lerma M.D, Myron Altamirano MD and Yesenia Zamora MD  Chiropractic Medicine  - Ana Maria Marc DC  Hand Surgery  - Miladis Mccain PA, Jazmin Hammond PA-C     Immunization History   Administered Date(s) Administered    COVID-19, MRNA, LN-S, PF (Pfizer) (Purple Cap) 01/12/2021, 01/31/2021, 10/07/2021    Influenza 11/28/2018, 11/08/2019    Influenza - Quadrivalent - PF *Preferred* (6 months and older) 12/14/2016, 11/30/2017, 11/15/2018    Zoster Recombinant 01/08/2020, 01/08/2020, 12/29/2021     Past Medical History:   Diagnosis Date    Degenerative joint disease (DJD) of lumbar spine      History of cold sores     Prostate cancer 2013    Sacroiliitis 04/07/2021    Squamous cell carcinoma 09/23/2019     Past Surgical History:   Procedure Laterality Date    ANTERIOR CRUCIATE LIGAMENT REPAIR      APPENDECTOMY      FINGER AMPUTATION Right 9/23/2019    Procedure: AMPUTATION, FINGER;  Surgeon: Luna Tirado MD;  Location: [facility];  Service: Orthopedics;  Laterality: Right;  regional MAC    FINGER MASS EXCISION Right 9/9/2019    Procedure: EXCISION, MASS, FINGER small finger resection;  Surgeon: Luna Tirado MD;  Location: [facility] OR;  Service: Orthopedics;  Laterality: Right;  regional MAC    INJECTION OF JOINT Right 7/22/2020    Procedure: INJECTION RIGHT SI JOINT AND RIGHT PIRIFORMIS INJECTION;  Surgeon: Roosevelt Reddy MD;  Location: [facility] PAIN MGT;  Service: Pain Management;  Laterality: Right;  NEEDS CONSENT, URGENT    INJECTION OF JOINT Right 4/7/2021    Procedure: INJECTION, JOINT, SI;  Surgeon: Roosevelt Reddy MD;  Location: [facility] PAIN [facility]GT;  Service: Pain Management;  Laterality: Right;    PROSTATECTOMY      radical    REMOVAL OF NAIL OF DIGIT Right 8/19/2019    Procedure: REMOVAL, NAIL, DIGIT right small finger;  Surgeon: Luna Tirado MD;  Location: [facility] OR;  Service: Orthopedics;  Laterality: Right;    TRANSFORAMINAL EPIDURAL INJECTION OF STEROID Right 10/4/2023    Procedure: INJECTION, STEROID, EPIDURAL, TRANSFORAMINAL APPROACH, RIGHT L4/L5 AND L5/S1;  Surgeon: Roosevelt Reddy MD;  Location: [facility] PAIN [facility]GT;  Service: Pain Management;  Laterality: Right;     Family History   Problem Relation Age of Onset    No Known Problems Mother     Prostate cancer Father     Prostate cancer Maternal Uncle     Melanoma Neg Hx     Colon cancer Neg Hx      Social History     Tobacco Use    Smoking status: Never    Smokeless tobacco: Never   Substance Use Topics    Alcohol use: Yes     Comment: social    Drug use: No     Past Medical, Surgical and Social history reviewed and  "verified by me.     Review of patient's allergies indicates:  No Known Allergies    Current Outpatient Medications on File Prior to Visit   Medication Sig Dispense Refill    sildenafiL (VIAGRA) 50 MG tablet Take 50 mg by mouth. as directed       Current Facility-Administered Medications on File Prior to Visit   Medication Dose Route Frequency Provider Last Rate Last Admin    0.9%  NaCl infusion   Intravenous Continuous YessicaAnthony MD           Review of Systems as per HPI  ROS  Constitutional: Negative for activity change, appetite change, fatigue, diaphoresis, chills and fever.   Respiratory: Negative for apnea, choking, chest tightness and wheezing.  Genitourinary: Negative for hematuria, flank pain, frequency and dysuria.   Skin: Negative for color change, pallor, rash and wound. Patient denies concerning moles or lesions.  Psychiatric/Behavioral: Negative for agitation, behavioral problems, confusion, decreased concentration and dysphoric mood.     All other systems reviewed and are negative.    OBJECTIVE     PHYSICAL EXAM  Vitals:    04/08/24 1503   BP: 106/70   Pulse: 66   SpO2: 98%   Weight: 89.5 kg (197 lb 6.8 oz)   Height: 5' 10" (1.778 m)     Vital Signs (Most Recent):  Pulse: 66 (04/08/24 1503)  BP: 106/70 (04/08/24 1503)  SpO2: 98 % (04/08/24 1503)   Weight:   Wt Readings from Last 1 Encounters:   04/08/24 89.5 kg (197 lb 6.8 oz)     Body mass index is 28.33 kg/m².    Vital signs and nursing assessment noted:  Low end normal blood pressure    GEN:   NAD, A & Ox3, atraumatic, well appearing, nontoxic appearing  HEENT:  PERRLA, EOMI, moist membranes, nl conjunctiva, no scleral icterus, no nystagmus, no nodes/nodules, soft, supple, FROM, no trachial deviation, nexus negative, normal TM bilaterally, normal pharynx  CV:   RRR no m/r/g, 2+ radial pulses, <2sec cap refill, no obvious JVD  RESP:  CTA B, no w/r/r, equal and bilateral chest rise, no respiratory distress  ABD:   soft, Nontender, Nondistended, " +BS, no guarding/rebound  :   Deferred  BACK:  FROM, no midline tenderness, no paraspinal tenderness  EXT:   FROM, VARGAS x 4, no swelling, no edema, no calf tenderness, no bony tenderness, no warmth or redness, no crepitus, no obvious deformity  LYMPH:  no gross adenopathy  NEURO:  GCS 15, CN II-XII grossly intact, no obvious motor/sensory deficit, no tremor, negative Romberg,  nl gait/coordination  PSYCH:   Cooperative, no SI/HI, no anxiety, nl mood/affect, nl judgement/thought process  SKIN:  Warm, dry, intact, no rashes/lesions or masses, nl color, no pallor      Tests    Labs reviewed and independently interpreted. Imaging studies reviewed.   Recent Results (from the past 2016 hour(s))   CBC Without Differential    Collection Time: 04/02/24  8:29 AM   Result Value Ref Range    WBC 4.86 3.90 - 12.70 K/uL    RBC 4.84 4.60 - 6.20 M/uL    Hemoglobin 15.3 14.0 - 18.0 g/dL    Hematocrit 45.3 40.0 - 54.0 %    MCV 94 82 - 98 fL    MCH 31.6 (H) 27.0 - 31.0 pg    MCHC 33.8 32.0 - 36.0 g/dL    RDW 13.2 11.5 - 14.5 %    Platelets 252 150 - 450 K/uL    MPV 9.8 9.2 - 12.9 fL   Comprehensive Metabolic Panel    Collection Time: 04/02/24  8:29 AM   Result Value Ref Range    Sodium 140 136 - 145 mmol/L    Potassium 4.4 3.5 - 5.1 mmol/L    Chloride 108 95 - 110 mmol/L    CO2 26 23 - 29 mmol/L    Glucose 99 70 - 110 mg/dL    BUN 12 8 - 23 mg/dL    Creatinine 1.0 0.5 - 1.4 mg/dL    Calcium 9.3 8.7 - 10.5 mg/dL    Total Protein 6.7 6.0 - 8.4 g/dL    Albumin 3.9 3.5 - 5.2 g/dL    Total Bilirubin 0.7 0.1 - 1.0 mg/dL    Alkaline Phosphatase 62 55 - 135 U/L    AST 25 10 - 40 U/L    ALT 24 10 - 44 U/L    eGFR >60.0 >60 mL/min/1.73 m^2    Anion Gap 6 (L) 8 - 16 mmol/L   Hemoglobin A1C    Collection Time: 04/02/24  8:29 AM   Result Value Ref Range    Hemoglobin A1C 5.3 4.0 - 5.6 %    Estimated Avg Glucose 105 68 - 131 mg/dL   Lipid Panel    Collection Time: 04/02/24  8:29 AM   Result Value Ref Range    Cholesterol 198 120 - 199 mg/dL     Triglycerides 92 30 - 150 mg/dL    HDL 57 40 - 75 mg/dL    LDL Cholesterol 122.6 63.0 - 159.0 mg/dL    HDL/Cholesterol Ratio 28.8 20.0 - 50.0 %    Total Cholesterol/HDL Ratio 3.5 2.0 - 5.0    Non-HDL Cholesterol 141 mg/dL   Prostate Specific Antigen, Diagnostic    Collection Time: 04/02/24  8:29 AM   Result Value Ref Range    PSA Diagnostic 0.06 0.00 - 4.00 ng/mL   TSH    Collection Time: 04/02/24  8:29 AM   Result Value Ref Range    TSH 1.919 0.400 - 4.000 uIU/mL       Latest Reference Range & Units 09/01/16 09:37 12/18/18 10:11 12/30/19 10:50 12/29/21 07:51 03/13/23 12:14 09/05/23 16:00 11/29/23 07:02 04/02/24 08:29   Prostate Specific Antigen 0.00 - 4.00 ng/mL 0.02  0.02        PSA Diagnostic 0.00 - 4.00 ng/mL 0.02 0.01 0.02 <0.01 0.03 0.06 0.05 0.06      Latest Reference Range & Units 03/13/23 12:14   HIV 1/2 Ag/Ab Non-reactive  Non-reactive      09/01/16 09:37   Hepatitis C Ab Negative     X-ray Knee Ortho Left with Flexion Jan 2023 Impression: 1. No acute displaced fracture or dislocation of the left knee.      MRI Knee Without Contrast Left Aug 2022 Impression:   1. Postsurgical changes of prior ACL reconstruction.  No evidence for complication.  2. Truncation of the medial meniscus, likely sequela of prior meniscectomy.  Complex T2 hyperintense signal within the posterior horn, possible retear.  Minimal free edge irregularity of the lateral meniscus.  3. Partial-thickness chondral fissuring over the patella and medial femoral condyle.    MRI Lumbar Spine Without Contrast Aug 2022 Impression:   1. Multilevel degenerative changes of the lumbar spine detailed above.  Moderate spinal canal stenosis noted at L4-L5.  Moderate left neural foraminal narrowing noted at L5-S1.    X-Ray Pelvis Routine AP Aug 2022 Impression: No significant conventional radiographic evidence relating to the hips is appreciated.  Facet arthropathy in the lower lumbar spine is seen.       CT Chest With Contrast Sept 2019 Impression:  1.  We detect no metastatic disease in the chest in this patient with history of squamous cell skin cancer of the distal right upper extremity.  2. Coronary artery calcification.      MRI Upper Extremity Joint WO Cont Left March 2017 IMPRESSION:   1.  Tendinosis, partial thickness tear along the articular surface of the anterior footprint, and mild bursal surface fraying of the supraspinatus tendon.  2. Moderate AC joint arthropathy.     ASSESSMENT:     1. Encounter for screening and preventative care    2. History of prostate cancer    3. Chronic back pain, unspecified back location, unspecified back pain laterality    4. Osteoarthritis, unspecified osteoarthritis type, unspecified site      Health Maintenance Due   Topic Date Due    TETANUS VACCINE  Never done    RSV Vaccine (Age 60+ and Pregnant patients) (1 - 1-dose 60+ series) Never done    Colorectal Cancer Screening  04/07/2023    Influenza Vaccine (1) 09/01/2023    COVID-19 Vaccine (4 - 2023-24 season) 09/01/2023      Hepatitis C Screening  Completed    Shingles Vaccine  Completed     68 y.o. male PMHx chronic lower back pain secondary to DJD of lumbar spine, sacrolitilitis, SCC s/p resection, prostate cancer s/p PROSTATECTOMY, erectile dysfunction on sildenafil presents for annual exam in [facility].  He has moderate lower back pain that radiates to the right and is otherwise in good health.  There is no report of trauma, urinary or bowel incontinence, f/c, n/v/d/c, IVDU, abdominal pain, weakness, or sensory deficit.He does have concern about is PSA tripling in value within the last few years.  He is looking for a 2nd opinion at [facility]. Home medications and health maintenance reviewed.  Exam is relatively benign.  No obvious skin lesions suspicious for malignancy noted. The 10-year ASCVD risk score (Fausto DK, et al., 2019) is: 9.3%.    MD[facility]  Reviewed: previous chart, nursing note and vitals  Reviewed previous: labs, x-ray, MRI and CT  scan  Interpretation: labs (ULN cholesterol otherwise relatively unremarkable Lipid Panel, CMP, CBC, TSH, HgA1C, HIV, Hep C ab. Although PSA absolute value normal, 3x previous value)      PLAN:   Periodic health maintenance visits annually or sooner with concerns.   Routine CMP, CBC, Lipid, HgA1C, TSH, PSA. Consider total & free PSA.  If pain persistent despite conservative measures, consider MRI of affected joint and/or limb to rule out further pathology  I discussed with the patient prior to order CT calcium score that he may be responsible for the cost if not covered by protocol or insurance.  He is amenable to proceeding.       Orders Placed This Encounter   Procedures    Cologuard Screening (Multitarget Stool DNA)    CT Cardiac Scoring    Ambulatory referral/consult to Pain Clinic    Ambulatory referral/consult to Urology    Ambulatory referral/consult to Hematology / Oncology    Connected Back Care Companion     Vaccinations to be obtained at local pharmacy or with medical provider patient's choosing.    Continue current medications and pain medication PRN. [Lipid-lowering therapy was not prescribed due to patient refusal. Patient would like to try lifestyle modifications.   Encouraged healthy eating to include omega 3, 1200 mg calcium daily (total diet plus supplement) and 800 IU of vitamin D daily, exercise, weight management and flexibility/agility training (yoga).   Recommend a high fiber, lean protein diet that is high in complex carbohydrates such as non-starchy vegetables and whole grains.   Recommend activities as tolerated with goal of 150 minutes of moderate-intensity physical activity and 2 days of muscle strengthening activity weekly.  Recommend daily sun protection/avoidance, use of at least SPF 30, broad spectrum sunscreen (OTC drug), and routine physician surveillance to optimize early detection.    The results of physical exam findings, labs, and imaging were reviewed with the patient.  Management of above assessments/visit diagnoses was discussed with patient. Precautions for return discussed at length. Patient was given ample time for questions. All questions asked and answered to the satisfaction of the patient. Patient is in agreement with the above and verbalized understanding. Total time spent caring for the patient today was 40 minutes. This includes time spent before the visit reviewing the chart, time spent during the visit, and time spent after the visit on documentation.    Sharad Camejo MD  [facility]    Disclaimer: This document was created using voice recognition software (M*Modal Fluency Direct). Although it may be edited, this document may contain errors related to incorrect recognition of the spoken word. Please contact the physician if clarification is needed.

## 2024-04-10 ENCOUNTER — PATIENT MESSAGE (OUTPATIENT)
Dept: ADMINISTRATIVE | Facility: OTHER | Age: 69
End: 2024-04-10
Payer: COMMERCIAL

## 2024-04-12 ENCOUNTER — PATIENT OUTREACH (OUTPATIENT)
Dept: OTHER | Facility: OTHER | Age: 69
End: 2024-04-12
Payer: COMMERCIAL

## 2024-04-22 ENCOUNTER — PATIENT OUTREACH (OUTPATIENT)
Dept: OTHER | Facility: OTHER | Age: 69
End: 2024-04-22
Payer: COMMERCIAL

## 2024-04-30 ENCOUNTER — TELEPHONE (OUTPATIENT)
Dept: PAIN MEDICINE | Facility: CLINIC | Age: 69
End: 2024-04-30
Payer: COMMERCIAL

## 2024-04-30 NOTE — TELEPHONE ENCOUNTER
Staff called pt to assist him with an appointment to see a provider or nurse practitioner to get injections. Pt states he is in the operating room at them moment he will give us call once he done.

## 2024-05-08 ENCOUNTER — PATIENT OUTREACH (OUTPATIENT)
Dept: OTHER | Facility: OTHER | Age: 69
End: 2024-05-08
Payer: COMMERCIAL

## 2024-05-08 NOTE — LETTER
May 8, 2024            Sharad Camejo MD       You previously referred your patient Juan Luis Mcgrath to the Connected Back Program. This message is to inform you that they have not completed the program and were removed after 30 days because of non adherence (failure to answer intake questionnaires).        Thank you,     The Connected Back Team

## 2024-05-08 NOTE — PROGRESS NOTES
Connected Back: Patient Outreach    Patient has not responded to intake QNRs for 30 days. Sending message to PCP/Patient. Closing program episode.

## 2024-05-10 ENCOUNTER — PATIENT MESSAGE (OUTPATIENT)
Dept: OTHER | Facility: OTHER | Age: 69
End: 2024-05-10
Payer: COMMERCIAL

## 2024-06-25 ENCOUNTER — PATIENT MESSAGE (OUTPATIENT)
Dept: ADMINISTRATIVE | Facility: OTHER | Age: 69
End: 2024-06-25
Payer: COMMERCIAL

## 2024-06-27 ENCOUNTER — PATIENT OUTREACH (OUTPATIENT)
Dept: OTHER | Facility: OTHER | Age: 69
End: 2024-06-27
Payer: COMMERCIAL

## 2024-07-23 ENCOUNTER — HOSPITAL ENCOUNTER (OUTPATIENT)
Dept: RADIOLOGY | Facility: HOSPITAL | Age: 69
Discharge: HOME | End: 2024-07-23
Attending: ORTHOPAEDIC SURGERY
Payer: COMMERCIAL

## 2024-07-23 DIAGNOSIS — M51.36 DDD (DEGENERATIVE DISC DISEASE), LUMBAR: ICD-10-CM

## 2024-07-23 PROCEDURE — 72110 X-RAY EXAM L-2 SPINE 4/>VWS: CPT | Mod: TC

## 2024-07-23 PROCEDURE — 72110 X-RAY EXAM L-2 SPINE 4/>VWS: CPT | Mod: 26,,, | Performed by: RADIOLOGY

## 2024-07-29 ENCOUNTER — PATIENT MESSAGE (OUTPATIENT)
Dept: OTHER | Facility: OTHER | Age: 69
End: 2024-07-29
Payer: COMMERCIAL

## 2024-08-11 ENCOUNTER — PATIENT MESSAGE (OUTPATIENT)
Dept: ADMINISTRATIVE | Facility: OTHER | Age: 69
End: 2024-08-11
Payer: COMMERCIAL

## 2024-08-12 ENCOUNTER — PATIENT OUTREACH (OUTPATIENT)
Dept: OTHER | Facility: OTHER | Age: 69
End: 2024-08-12
Payer: COMMERCIAL

## 2024-08-12 NOTE — PROGRESS NOTES
Connected Back: Patient Outreach    Weekly QNR Escalation - Left VM, sent introNetworkshart message.

## 2024-08-16 ENCOUNTER — PATIENT OUTREACH (OUTPATIENT)
Dept: OTHER | Facility: OTHER | Age: 69
End: 2024-08-16
Payer: COMMERCIAL

## 2024-08-19 ENCOUNTER — OFFICE VISIT (OUTPATIENT)
Dept: DERMATOLOGY | Facility: CLINIC | Age: 69
End: 2024-08-19
Payer: COMMERCIAL

## 2024-08-19 ENCOUNTER — PATIENT MESSAGE (OUTPATIENT)
Dept: INTERNAL MEDICINE | Facility: CLINIC | Age: 69
End: 2024-08-19
Payer: COMMERCIAL

## 2024-08-19 ENCOUNTER — PATIENT MESSAGE (OUTPATIENT)
Dept: ADMINISTRATIVE | Facility: OTHER | Age: 69
End: 2024-08-19
Payer: COMMERCIAL

## 2024-08-19 DIAGNOSIS — Z12.83 SCREENING EXAM FOR SKIN CANCER: ICD-10-CM

## 2024-08-19 DIAGNOSIS — D22.9 MULTIPLE BENIGN NEVI: ICD-10-CM

## 2024-08-19 DIAGNOSIS — Z85.828 HISTORY OF SKIN CANCER: ICD-10-CM

## 2024-08-19 DIAGNOSIS — L73.8 SEBACEOUS HYPERPLASIA: ICD-10-CM

## 2024-08-19 DIAGNOSIS — L81.4 LENTIGO: ICD-10-CM

## 2024-08-19 DIAGNOSIS — L57.0 ACTINIC KERATOSIS: ICD-10-CM

## 2024-08-19 DIAGNOSIS — L82.1 SEBORRHEIC KERATOSES: ICD-10-CM

## 2024-08-19 DIAGNOSIS — D18.01 CHERRY ANGIOMA: ICD-10-CM

## 2024-08-19 DIAGNOSIS — D48.5 NEOPLASM OF UNCERTAIN BEHAVIOR OF SKIN: Primary | ICD-10-CM

## 2024-08-19 PROCEDURE — 88341 IMHCHEM/IMCYTCHM EA ADD ANTB: CPT | Performed by: PATHOLOGY

## 2024-08-19 PROCEDURE — 99213 OFFICE O/P EST LOW 20 MIN: CPT | Mod: 25,S$GLB,, | Performed by: STUDENT IN AN ORGANIZED HEALTH CARE EDUCATION/TRAINING PROGRAM

## 2024-08-19 PROCEDURE — 1160F RVW MEDS BY RX/DR IN RCRD: CPT | Mod: CPTII,S$GLB,, | Performed by: STUDENT IN AN ORGANIZED HEALTH CARE EDUCATION/TRAINING PROGRAM

## 2024-08-19 PROCEDURE — 11104 PUNCH BX SKIN SINGLE LESION: CPT | Mod: S$GLB,,, | Performed by: STUDENT IN AN ORGANIZED HEALTH CARE EDUCATION/TRAINING PROGRAM

## 2024-08-19 PROCEDURE — 17000 DESTRUCT PREMALG LESION: CPT | Mod: XS,S$GLB,, | Performed by: STUDENT IN AN ORGANIZED HEALTH CARE EDUCATION/TRAINING PROGRAM

## 2024-08-19 PROCEDURE — 3288F FALL RISK ASSESSMENT DOCD: CPT | Mod: CPTII,S$GLB,, | Performed by: STUDENT IN AN ORGANIZED HEALTH CARE EDUCATION/TRAINING PROGRAM

## 2024-08-19 PROCEDURE — 1159F MED LIST DOCD IN RCRD: CPT | Mod: CPTII,S$GLB,, | Performed by: STUDENT IN AN ORGANIZED HEALTH CARE EDUCATION/TRAINING PROGRAM

## 2024-08-19 PROCEDURE — 88305 TISSUE EXAM BY PATHOLOGIST: CPT | Performed by: PATHOLOGY

## 2024-08-19 PROCEDURE — 1101F PT FALLS ASSESS-DOCD LE1/YR: CPT | Mod: CPTII,S$GLB,, | Performed by: STUDENT IN AN ORGANIZED HEALTH CARE EDUCATION/TRAINING PROGRAM

## 2024-08-19 PROCEDURE — 17003 DESTRUCT PREMALG LES 2-14: CPT | Mod: S$GLB,,, | Performed by: STUDENT IN AN ORGANIZED HEALTH CARE EDUCATION/TRAINING PROGRAM

## 2024-08-19 PROCEDURE — 99999 PR PBB SHADOW E&M-EST. PATIENT-LVL III: CPT | Mod: PBBFAC,,, | Performed by: STUDENT IN AN ORGANIZED HEALTH CARE EDUCATION/TRAINING PROGRAM

## 2024-08-19 PROCEDURE — 88342 IMHCHEM/IMCYTCHM 1ST ANTB: CPT | Performed by: PATHOLOGY

## 2024-08-19 PROCEDURE — 1125F AMNT PAIN NOTED PAIN PRSNT: CPT | Mod: CPTII,S$GLB,, | Performed by: STUDENT IN AN ORGANIZED HEALTH CARE EDUCATION/TRAINING PROGRAM

## 2024-08-19 PROCEDURE — 3044F HG A1C LEVEL LT 7.0%: CPT | Mod: CPTII,S$GLB,, | Performed by: STUDENT IN AN ORGANIZED HEALTH CARE EDUCATION/TRAINING PROGRAM

## 2024-08-19 NOTE — PATIENT INSTRUCTIONS
Punch Biopsy Wound Care    Your doctor has performed a punch biopsy today.  A band aid and antibiotic ointment has been placed over the site.  This should remain in place for 24 hours.  It is recommended that you keep the area dry for the first 24 hours.  After 24 hours, you may remove the band aid and wash the area with warm soap and water and apply Vaseline jelly.  Many patients prefer to use Neosporin or Bacitracin ointment.  This is acceptable; however know that you can develop an allergy to this medication even if you have used it safely for years.  It is important to keep the area moist.  Letting it dry out and get air slows healing time, will worsen the scar, and make it more difficult to remove the stitches if they were placed.  Band aid is optional after first 24 hours.      If you notice increasing redness, tenderness, pain, or yellow drainage at the biopsy or surgical site, please notify your doctor.  These are signs of an infection.    If your biopsy/surgical site is bleeding, apply firm pressure for 15 minutes straight.  Repeat for another 15 minutes, if it is still bleeding.   If the surgical site continues to bleed, then please contact your doctor.      For [facility] users:   You will receive your biopsy results in [facility] as soon as they are available. Please be assured that your physician/provider will review your results and will then determine what further treatment, evaluation, or planning is required. You should be contacted by your physician's/provider's office within 5 business days of receiving your results; If not, please reach out to directly. This is one more way [facility] is putting you first.       [address] [place] [address]/ [phone] FAX/ [email or web address]          Sunscreen Guidelines  Sunscreen protects your skin by absorbing and reflecting ultraviolet rays. All sunscreens have a sun protection factor (SPF) rating that indicates how long a  sunscreen will remain effective on the skin.    Why protect your skin?  The sun's rays are composed of many different wavelengths, including UVA, UVB, and visible light that each affect the skin differently.    UVB: sunburn, photoaging, skin cancer (melanoma, basal cell, and squamous cell carcinomas) and modulation of the skin's immune system.    UVA: similar to above but thought to contribute more to aging; at the same dose of UVB it is less powerful however the sun has 10-20 times the levels of UVA as compared with UVB.  Visible light: implicated in causing unwanted darkening of skin, such as melasma and post-inflammatory hyperpigmentation in darker skin types     If I have dark skin, do I need to worry about the sun?    More darkly pigmented skin is more protected against UV-induced skin cancer, sunburn, and photoaging, though may still suffer from sun-related conditions, including melasma, hyperpigmentation, and other dark spots.    Sun avoidance  As a general rule, stay out of the sun as much as possible between 10 a.m. - 4 p.m.    Download the EPA UV index joseph to track the UV index by hour in your zip code.      Which sunscreen should I choose?  The best sunscreen to use varies by individual. The one that feels best on your skin and fits your lifestyle will be the one you will likely use most regularly.   Active ingredients of sunscreen vary by , and may be a chemical (such as avobenzone or oxybenzone) or physical agent (such as zinc oxide or titanium dioxide). I recommend a physical agent.  A water-resistant sunscreen is one that maintains the SPF level after 40 minutes of water exposure. A very water-resistant sunscreen maintains the SPF level after 80 minutes of water exposure.    Sunscreen: this is the last layer in sun protection   Be generous: 1 shot glass of sunscreen for your body, ½ teaspoon for your face/neck  Reapply every 2 hours  Broad spectrum (provides UVA/UVB protection), SPF 50 or  "above  Avoid spray sunscreens: less effective and have been found to contain benzene (carcinogen)    Sun protective clothing  Although sunscreen helps minimize sun damage, no sunscreen completely blocks all wavelengths of UV light. Wearing sun protective clothing such as hats, rashguards or swim shirts, and long sleeves and/or pants, as well as avoiding sun exposure from 10 a.m. to 4 p.m. will help protect your skin from overexposure and minimize sun damage. Seek shade.  Long sleeved clothing, hats, and sunglasses: makes sun protection easier, more effective, and can even be more affordable, since sunscreen needs to be reapplied frequently.    Solumbra (www.sunprectautions.com)  [employer] (www.[employer])  Coolibar (www.MDSmartSearch.comrApartment Adda)  Land's end (www.[employer])  Hats from Eugenia [employer] (www.helenkaminski.com)    My Favorite Sunscreens:  Physical blockers: Can have a "white case" but in general are more effective  - Face: CeraVe tinted mineral sunscreen, Bare Minerals complexion rescue (20 shades), Elta MD (UV elements, UV physical, UV restore, etc), Tizo ultra zinc tinted, Cetaphil Sheer Mineral Face Liquid Sunscreen  - Body: Blue Lizard, Neutrogena Sheer Zinc, Eucerin Daily Protection, Aveeno Baby   "

## 2024-08-19 NOTE — PROGRESS NOTES
Subjective:      Patient ID:  Juan Luis Mcgrath is a 69 y.o. male who presents for   Chief Complaint   Patient presents with    Skin Check     TBSE     Juan Luis Mcgrath is a 69 y.o. male who presents for: FBSE screening exam for skin cancer.    Last office visit 10/20/2022 for f/u of SCC    The patient has the following lesions of concern:  Location: Scalp  Duration: 2 mos  Symptoms: tenderness, pain  Relieving factors/Previous treatments: none    Pt has h/o SCC, had tip of finger amputated         Problem List Items Addressed This Visit          Oncology    History of skin cancer    Overview     - 10/2022: Mid chest, SCCis s/p excision  -2019: SCC, right 5th finger s/p excision          Other Visit Diagnoses       Neoplasm of uncertain behavior of skin    -  Primary    Relevant Orders    Specimen to Pathology, Dermatology    Actinic keratosis        Lentigo        Guillory angioma        Seborrheic keratoses        Sebaceous hyperplasia        Multiple benign nevi        Screening exam for skin cancer                Review of Systems   Skin:  Positive for activity-related sunscreen use and wears hat. Negative for daily sunscreen use and recent sunburn.   Hematologic/Lymphatic: Does not bruise/bleed easily.       Objective:   Physical Exam   Constitutional: He appears well-developed and well-nourished. No distress.   Neurological: He is alert and oriented to person, place, and time. He is not disoriented.   Psychiatric: He has a normal mood and affect.   Skin:   Areas Examined (abnormalities noted in diagram):   Scalp / Hair Palpated and Inspected  Head / Face Inspection Performed  Neck Inspection Performed  Chest / Axilla Inspection Performed  Abdomen Inspection Performed  Genitals / Buttocks / Groin Inspection Performed  Back Inspection Performed  RUE Inspected  LUE Inspection Performed  RLE Inspected  LLE Inspection Performed  Nails and Digits Inspection Performed                     Diagram Legend      Erythematous scaling macule/papule c/w actinic keratosis       Vascular papule c/w angioma      Pigmented verrucoid papule/plaque c/w seborrheic keratosis      Yellow umbilicated papule c/w sebaceous hyperplasia      Irregularly shaped tan macule c/w lentigo     1-2 mm smooth white papules consistent with Milia      Movable subcutaneous cyst with punctum c/w epidermal inclusion cyst      Subcutaneous movable cyst c/w pilar cyst      Firm pink to brown papule c/w dermatofibroma      Pedunculated fleshy papule(s) c/w skin tag(s)      Evenly pigmented macule c/w junctional nevus     Mildly variegated pigmented, slightly irregular-bordered macule c/w mildly atypical nevus      Flesh colored to evenly pigmented papule c/w intradermal nevus       Pink pearly papule/plaque c/w basal cell carcinoma      Erythematous hyperkeratotic cursted plaque c/w SCC      Surgical scar with no sign of skin cancer recurrence      Open and closed comedones      Inflammatory papules and pustules      Verrucoid papule consistent consistent with wart     Erythematous eczematous patches and plaques     Dystrophic onycholytic nail with subungual debris c/w onychomycosis     Umbilicated papule    Erythematous-base heme-crusted tan verrucoid plaque consistent with inflamed seborrheic keratosis     Erythematous Silvery Scaling Plaque c/w Psoriasis     See annotation            Assessment / Plan:      Pathology Orders:       Normal Orders This Visit    Specimen to Pathology, Dermatology     Questions:    Procedure Type: Dermatology and skin neoplasms    Number of Specimens: 1    ------------------------: -------------------------    Spec 1 Procedure: Biopsy    Spec 1 Clinical Impression: AK, SCC, iSK    Spec 1 Source: left scalp vertex    Release to patient: Immediate    Release to patient:           Neoplasm of uncertain behavior of skin  -     Specimen to Pathology, Dermatology    Punch biopsy procedure note:  Punch biopsy performed after verbal  consent obtained. Area marked and prepped with alcohol. Approximately 1cc of 1% lidocaine with epinephrine injected. 4 mm disposable punch used to remove lesion. Hemostasis obtained and biopsy site closed with 1 - 2 Prolene sutures. Wound care instructions reviewed with patient and handout given.      Actinic keratosis  Cryosurgery Procedure Note    Verbal consent from the patient is obtained including, but not limited to, risk of hypopigmentation/hyperpigmentation, scar, recurrence of lesion. The patient is aware of the precancerous quality and need for treatment of these lesions. Liquid nitrogen cryosurgery is applied to the 3 actinic keratoses, as detailed in the physical exam, to produce a freeze injury. The patient is aware that blisters may form and is instructed on wound care with gentle cleansing and use of vaseline ointment to keep moist until healed. The patient is supplied a handout on cryosurgery and is instructed to call if lesions do not completely resolve.    Lentigo  Guillory angioma  Seborrheic keratoses  Sebaceous hyperplasia  Multiple benign nevi  Reassurance given to patient. No treatment is necessary.   Treatment of benign, asymptomatic lesions may be considered cosmetic.    History of skin cancer  Screening exam for skin cancer  Area(s) of previous NMSC evaluated with no signs of recurrence.    Upper body skin examination performed today including at least 6 points as noted in physical examination. No lesions suspicious for malignancy noted.    Recommend daily sun protection/avoidance and use of at least SPF 30, broad spectrum sunscreen (OTC drug).                Follow up in about 6 months (around 2/19/2025) for TBSE.

## 2024-08-20 DIAGNOSIS — Z85.46 HISTORY OF PROSTATE CANCER: Primary | ICD-10-CM

## 2024-08-21 ENCOUNTER — TELEPHONE (OUTPATIENT)
Dept: UROLOGY | Facility: CLINIC | Age: 69
End: 2024-08-21
Payer: COMMERCIAL

## 2024-08-23 LAB
FINAL PATHOLOGIC DIAGNOSIS: NORMAL
GROSS: NORMAL
Lab: NORMAL
MICROSCOPIC EXAM: NORMAL

## 2024-08-26 ENCOUNTER — TELEPHONE (OUTPATIENT)
Dept: OPTOMETRY | Facility: CLINIC | Age: 69
End: 2024-08-26
Payer: COMMERCIAL

## 2024-08-28 ENCOUNTER — TELEPHONE (OUTPATIENT)
Dept: OPTOMETRY | Facility: CLINIC | Age: 69
End: 2024-08-28
Payer: COMMERCIAL

## 2024-08-29 ENCOUNTER — TELEPHONE (OUTPATIENT)
Dept: DERMATOLOGY | Facility: CLINIC | Age: 69
End: 2024-08-29
Payer: COMMERCIAL

## 2024-09-03 ENCOUNTER — PATIENT OUTREACH (OUTPATIENT)
Dept: OTHER | Facility: OTHER | Age: 69
End: 2024-09-03
Payer: COMMERCIAL

## 2024-09-09 ENCOUNTER — PATIENT OUTREACH (OUTPATIENT)
Dept: OTHER | Facility: OTHER | Age: 69
End: 2024-09-09
Payer: COMMERCIAL

## 2024-09-10 ENCOUNTER — TELEPHONE (OUTPATIENT)
Dept: OPTOMETRY | Facility: CLINIC | Age: 69
End: 2024-09-10
Payer: COMMERCIAL

## 2024-09-12 ENCOUNTER — OFFICE VISIT (OUTPATIENT)
Dept: OPTOMETRY | Facility: CLINIC | Age: 69
End: 2024-09-12
Payer: COMMERCIAL

## 2024-09-12 DIAGNOSIS — Z46.0 FITTING AND ADJUSTMENT OF SPECTACLES AND CONTACT LENSES: Primary | ICD-10-CM

## 2024-09-12 DIAGNOSIS — H43.813 PVD (POSTERIOR VITREOUS DETACHMENT), BOTH EYES: ICD-10-CM

## 2024-09-12 DIAGNOSIS — H52.4 PRESBYOPIA: ICD-10-CM

## 2024-09-12 DIAGNOSIS — Z46.0 FITTING AND ADJUSTMENT OF SPECTACLES AND CONTACT LENSES: ICD-10-CM

## 2024-09-12 DIAGNOSIS — H52.13 MYOPIA, BILATERAL: Primary | ICD-10-CM

## 2024-09-12 PROCEDURE — 99999 PR PBB SHADOW E&M-EST. PATIENT-LVL I: CPT | Mod: PBBFAC,,, | Performed by: OPTOMETRIST

## 2024-09-12 PROCEDURE — 99999 PR PBB SHADOW E&M-EST. PATIENT-LVL II: CPT | Mod: PBBFAC,,, | Performed by: OPTOMETRIST

## 2024-09-16 ENCOUNTER — PATIENT OUTREACH (OUTPATIENT)
Dept: OTHER | Facility: OTHER | Age: 69
End: 2024-09-16
Payer: COMMERCIAL

## 2024-10-01 ENCOUNTER — PATIENT MESSAGE (OUTPATIENT)
Dept: OPTOMETRY | Facility: CLINIC | Age: 69
End: 2024-10-01
Payer: COMMERCIAL

## 2024-10-02 NOTE — PROGRESS NOTES
"[facility]   Physical Therapy Initial Evaluation      Date: 10/3/2024   Name: Juan Luis Mcgrath  Clinic Number: [id number]    Therapy Diagnosis: No diagnosis found.  Physician: Self, Aaareferral    Physician Orders: PT Eval and Treat ***  Medical Diagnosis from Referral: ***  Evaluation Date: 10/3/2024  Authorization Period Expiration: ***  Plan of Care Expiration: ***  Visit # / Visits authorized: ***/ ***      Foto  Date  Score    #1/3 10/3/2024 ***%   #2/3     #3/3          Precautions: Standard and history of cancer    Time In: ***  Time Out: ***  Total Appointment Time (timed & untimed codes): *** minutes    SUBJECTIVE   Date of onset: ***    History of current condition - Juan Luis reports: ***    Falls: ***    Imaging, {Mri/ctscan/bone scan:80890}: ***    Prior Therapy: ***  Social History: *** {LIVES WITH:31206}  Occupation: ***  Prior Level of Function: ***  Current Level of Function: ***    Pain:  Current {0-10:54293::"0"}/10, worst {0-10:78969::"0"}/10, best {0-10:70709::"0"}/10   Location: ***  Description: {Pain Description:23342}  Aggravating Factors: {Causes; Pain:04314}  Easing Factors: {Pain (activities that relieve):39586}    Patients goals: ***     Medical History:   Past Medical History:   Diagnosis Date    Degenerative joint disease (DJD) of lumbar spine     History of cold sores     History of skin cancer 8/19/2024    Prostate cancer 2013    Sacroiliitis 04/07/2021    Squamous cell carcinoma 09/23/2019       Surgical History:   Juan Luis Mcgrath  has a past surgical history that includes Anterior cruciate ligament repair; Prostatectomy; Appendectomy; Removal of nail of digit (Right, 8/19/2019); Finger mass excision (Right, 9/9/2019); Finger amputation (Right, 9/23/2019); Injection of joint (Right, 7/22/2020); Injection of joint (Right, 4/7/2021); and Transforaminal epidural injection of steroid (Right, 10/4/2023).    Medications:   Juan Luis has a current medication " list which includes the following prescription(s): sildenafil, and the following Facility-Administered Medications: 0.9% nacl.    Allergies:   Review of patient's allergies indicates:  No Known Allergies     OBJECTIVE     Observations:  Cervical:  Thoracic:  Lumbar:  Standing:  Pelvis:      Gait:        Active Range of Motion:  Lumbar Spine   Action Degrees Dysfunction   Flexion ***    Extension ***    Left Rotation ***%    Right Rotation ***%    Left Sidebend ***    Right Sidebend ***         Passive Range of Motion:  Lower Extremity   Action Left Right   Hip Flexion *** degrees *** degrees   Hip Extension *** degrees *** degrees   Hip Abduction *** degrees *** degrees   Hip Internal Rotation *** degrees *** degrees   Hip External Rotation *** degrees *** degrees   Knee Flexion *** degrees *** degrees   Knee Extension *** degrees *** degrees   Ankle Dorsiflexion *** degrees *** degrees       Manual Muscle Testing:  Lower Extremity   Action Left Right   Hip Flexion *** / 5 *** / 5   Hip Extension *** / 5 *** / 5   Hip Abduction *** / 5 *** / 5   Hip External Rotation *** / 5 *** / 5   Knee Flexion *** / 5 *** / 5   Knee Extension *** / 5 *** / 5   Ankle Dorsiflexion *** / 5 *** / 5       Lumbar Myotomes   Level Resisted Movement Left Right   L1 - L2 Hip Flexion *** / 5 *** / 5   L3 - L4 Knee Extension *** / 5 *** / 5   L4 Ankle Dorsiflexion *** / 5 *** / 5   L5 Great Toe Extension *** / 5 *** / 5   S1 Plantarflexion *** / 5 *** / 5        Special Tests:  Positive (+) Tests:  ***  Negative (-) Tests:  ***  DTR's  L4  S1      Balance:  Single Limb Stance:  Left / Eyes Open / Firm =  Right / Eyes Open / Firm =      Functional Movement & Mechanics:        Palpation:        Intake Outcome Measure for FOTO Lumbar Spine Survey    Therapist reviewed FOTO scores for Juan Luis Mcgrath on 10/3/2024.   FOTO documents entered into EPIC - see Media section.    Intake Score: ***%       TREATMENT     Total Treatment time  (time-based codes) separate from Evaluation: *** minutes      Juan Luis received the treatments listed below:      Therapeutic Exercises to develop {AMB PT PROGRESS OBJECTIVE:24043} for *** minutes including:  ***      Manual Therapy Techniques: {AMB PT PROGRESS MANUAL THERAPY:65771} were applied to the: *** for *** minutes, including:  ***      Therapeutic Activities to improve functional performance for *** minutes, including:  Education (see below)  Questions and answers    PATIENT EDUCATION AND HOME EXERCISES     Education provided:   Anatomy and Pathology.  Symptom management and plan of care progressions.  Home Exercise Program.    Written Home Exercises Provided: yes. Exercises were reviewed and Juan Luis was able to demonstrate them prior to the end of the session.  Juan Luis demonstrated good  understanding of the education provided. See EMR under Patient Instructions for exercises provided during therapy sessions.    ASSESSMENT     Juan Luis is a 69 y.o. male referred to outpatient Physical Therapy with a medical diagnosis of ***. Patient presents with ***    Patient prognosis is {REHAB PROGNOSIS OHS:40494}.   Patientt will benefit from skilled outpatient Physical Therapy to address the deficits stated above and in the chart below, provide patient /family education, and to maximize patientt's level of independence.     Plan of care discussed with patient: Yes  Patient's spiritual, cultural and educational needs considered and patient is agreeable to the plan of care and goals as stated below:     Anticipated Barriers for therapy: ***    Medical Necessity is demonstrated by the following  History  Co-morbidities and personal factors that may impact the plan of care [] LOW: no personal factors / co-morbidities  [] MODERATE: 1-2 personal factors / co-morbidities  [] HIGH: 3+ personal factors / co-morbidities    Moderate / High Support Documentation:   Co-morbidities affecting plan of care: ***    Personal Factors:   {Personal  Factors:64683}     Examination  Body Structures and Functions, activity limitations and participation restrictions that may impact the plan of care [] LOW: addressing 1-2 elements  [] MODERATE: 3+ elements  [] HIGH: 4+ elements (please support below)    Moderate / High Support Documentation: ***     Clinical Presentation [] LOW: stable  [] MODERATE: Evolving  [] HIGH: Unstable     Decision Making/ Complexity Score: {Desc; low/moderate/high:946338}     Goals:  Short Term Goals: 4-5 weeks   Patient will have reduced pain complaints from ***/10 to less than or equal to ***/10. (Not Met - Progressing)  Patient will demonstrate increased AROM/PROM by approximately 25% to 50% of initial measurements. (Not Met - Progressing)  Patient will demonstrate increased muscle strength of at least one-half grade as compared to the initial measurements. (Not Met - Progressing)    Long Term Goals: 8-10 weeks   Patient will have reduced pain complaints from ***/10 to less than or equal to ***/10. (Not Met - Progressing)  Patient will demonstrate increased AROM/PROM by approximately 75% to 100% of initial measurements. (Not Met - Progressing)  Patient will demonstrate increased muscle strength of at least one grade as compared to the initial measurements. (Not Met - Progressing)  Patient will improve Lumbar Spine FOTO Intake score from ***% to greater than or equal to ***%. (Not Met - Progressing)  Patient will be independent with their home exercise program. (Not Met - Progressing)    PLAN   Plan of care Certification: 10/3/2024 to ***.    Outpatient Physical Therapy *** times weekly for *** weeks to include the following interventions: {[place] PLAN:39458}.     Thaddeus Au, PT, DPT, OCS

## 2024-10-03 ENCOUNTER — CLINICAL SUPPORT (OUTPATIENT)
Dept: REHABILITATION | Facility: HOSPITAL | Age: 69
End: 2024-10-03
Payer: COMMERCIAL

## 2024-10-03 DIAGNOSIS — R29.898 WEAKNESS OF BOTH LOWER EXTREMITIES: ICD-10-CM

## 2024-10-03 DIAGNOSIS — M53.86 DECREASED RANGE OF MOTION OF LUMBAR SPINE: ICD-10-CM

## 2024-10-03 DIAGNOSIS — R29.3 ABNORMAL POSTURE: Primary | ICD-10-CM

## 2024-10-03 PROCEDURE — 97162 PT EVAL MOD COMPLEX 30 MIN: CPT | Performed by: PHYSICAL THERAPIST

## 2024-10-03 PROCEDURE — 97140 MANUAL THERAPY 1/> REGIONS: CPT | Performed by: PHYSICAL THERAPIST

## 2024-10-03 PROCEDURE — 97530 THERAPEUTIC ACTIVITIES: CPT | Performed by: PHYSICAL THERAPIST

## 2024-10-03 NOTE — PLAN OF CARE
[facility]  Physical Therapy Direct Access Initial Evaluation    Name: Juan Luis Mcgrath  Clinic Number: [id number]    Therapy Diagnosis:   Encounter Diagnoses   Name Primary?    Abnormal posture Yes    Decreased range of motion of lumbar spine     Weakness of both lower extremities        Reason for Visit:: PT eval and treat  Evaluation Date: 10/3/2024  Authorization Period Expiration: 10/3/2025  Plan of Care Expiration: 11/28/2024  Progress Note due: 11/3/2024   Visit # / Visits authorized: 1/ 1  FOTO Visit #:  1/3    Time In: 0904  Time Out: 1000  Total Billable Time: 53 minutes    Precautions: Standard, history of prostate cancer    Medical  History:     Current Care Team:  Primary Care Provider:  NO  Specialty Provider: NO    Date of Last Physical Examination: 4/8/2024    Past Medical History:  Past Medical History:   Diagnosis Date    Degenerative joint disease (DJD) of lumbar spine     History of cold sores     History of skin cancer 8/19/2024    Prostate cancer 2013    Sacroiliitis 04/07/2021    Squamous cell carcinoma 09/23/2019       Past Surgical History:   has a past surgical history that includes Anterior cruciate ligament repair; Prostatectomy; Appendectomy; Removal of nail of digit (Right, 8/19/2019); Finger mass excision (Right, 9/9/2019); Finger amputation (Right, 9/23/2019); Injection of joint (Right, 7/22/2020); Injection of joint (Right, 4/7/2021); and Transforaminal epidural injection of steroid (Right, 10/4/2023).  Other Surgeries  not stated above: NO    Medications:  Juan Luis has a current medication list which includes the following prescription(s): sildenafil, and the following Facility-Administered Medications: 0.9% nacl.  Other Medications not stated above: NO    Which of the following medications have you taken in the last week?   Aspirin: NO  Tylenol: NO  Antiinflammatories (Advil/Motrin/Ibuprofen/Aleve etc): YES Aleve  Stomach Ulcer medications:  NO  Vitamins/mineral supplements: NO  Herbals/Remedies: NO  Other: NO    How much caffeinated coffee or caffeine containing beverages do you drink per day: 5    Tobacco Use: How many packs do you smoke per day: N/A for 0 years  Quit? NO    Alcohol Intake: How many days a week do you drink alcohol?: 1  Drinks consumed in one sittin    Has anyone in your immediate family (parents, brothers, sisters) ever been treated for any of the following?  Diabetes No  Cancer Yes  Heart disease No  Alcoholism (chemical dependency) No  High blood pressure No  Depression No  Stroke No  Kidney disease No  Inflammatory Arthritis (Rheumatoid, Ankylosing) No    Allergies:  Review of patient's allergies indicates:  No Known Allergies   Latex Sensitive: No  Other Allergies not stated above: NO    Imaging: x-ray lumbar spine MD note FINDINGS:  Moderate levocurvature of the lumbar spine.     Few mm left lateral translation of L4 relative to L5.  Grade 1 anterolisthesis of L4 relative to L5.     The vertebral body heights are well maintained.     No fracture, no osseous lesions.  Significant facet joint osseous hypertrophy at L4-5 and L5-S1.     Flexion and extension views demonstrate no translational abnormalities.     The bilateral sacroiliac joints appear normal.     Impression:     Significant spondylosis of the lower lumbar spine.    Subjective::     Date of onset: 3-4 years ago  History of current condition - Juan Luis reports: Pain at right buttocks with little to no back pain. Denies any radiating pain down the RLE. 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 Pain is worst late in the afternoon and and in the evening. Ongoing for the last 3 to 4 years. Gradual onset. Worst is 8-9/10 in the evening. Improves with NSAID's and when sitting. Some stiffness in the morning. Pain reproduced when going up and down stairs.  Have you recently noted:  weight loss/gain No   joint/muscle swelling No  nausea/vomiting  "No  easy bruising No  dizziness/lightheadedness No  excessive bleeding No  fatigue No  difficulty breathing No  weakness No  regular cough No  fever/chills/sweats No  arm/leg swelling No  numbness or tingling No  heart racing in your chest No  tremors No  difficulty swelling No  seizures No  Heartburn/indigestion No  double vision No  Constipation/diarrhea No  problems urinating (difficulty starting, painful etc.) No  problems sleeping No  urinary incontinence No  sexual difficulties No  night sweats No  hearing problems  No  stress at home or work No    Prior Therapy: Yes - in 2021 at [facility]  Social History: 2 story home; lives with their family  Occupation: Anesthesiologist  Leisure Activities: Boating  Prior Level of Function: no pain or difficulties with standing, walking, stair climbing  Current Level of Function: moderate to severe pain and difficulty with standing, walking, and stair climbing    Pain:  Current 0/10, worst 9/10, best 0/10   Location: right buttock which can extend into his lateral leg  Description: Dull  Aggravating Factors: Standing and walking and stairs  Easing Factors: rest and sitting and NSAId's    Pts goals: "I want to walk better and without pain."    Objective     Posture:  flatback, R elevated hip, L lateral trunk lean, Left Lumbar curve, Left foot pronation, Elevated right iliac crest  Anterior / lower Right ASIS in supine    Functional Movements:   Gait: L trunk lean, R hip hike, asymmetric step length   SLS: R single leg stance- contralateral hip drop, impaired balance, pain and left trunk lean; L single leg stance- impaired balance      Dermatomes Right Left Comments   L2 Intact   Intact    L3 Intact Intact    L4 Intact Intact    L5 Intact Intact    S1 Intact Intact    S2 Intact Intact        Myotomes Right Left Comments   L2 5/5    5/5       L3 5/5    5/5       L4 5/5    5/5       L5 5/5    5/5       S1 5/5    5/5       S2 5/5    5/5         Lumbar Range of " Motion:    Range of Motion Comments   Flexion 65 degrees        Extension 12 degrees        Left Side Bending 10 degrees      Right Side Bending 10 degrees      Left Rotation 20%    Right Rotation 30%      Hip Passive Range of Motion:   Right  Left    Flexion 105 120   Abduction 35 35   Extension 15 18   Ext. Rotation 30 30   Int. Rotation 20 20       Lower Extremity Strength  Right   Left     Quadriceps: 5/5 Quadriceps: 5/5   Hamstrings: 4/5 Hamstrings: 4/5   Iliospoas: 4/5 Iliospoas: 4-/5   Hip extension:  4-/5 Hip extension: 4-/5   PGM: 4-/5 PGM: 4-/5   Hip ER:  3/5 Hip ER: 3-/5   Hip IR: 4+/5 Hip IR: 4+/5       SIJ  Right  Left    Thigh Thrust (--) (--)   Compression (--) (--)   Distraction (--) (--)   Sacral Thrust (--) (--)   Flick Test (+) (--)   Standing flexion test (+) (--)         Neural Tension Testing:   SLR: next visit    Joint Mobility: Hypomobile L2-3 and L5-S1 with segmental rocking assessment    Palpation: Anterior / lower Right ASIS in supine      CMS Impairment/Limitation/Restriction for FOTO Lumbar Survey    Therapist reviewed FOTO scores for Juan Luis Mcgrath on 10/3/2024.   FOTO documents entered into EPIC - see Media section.    FOTO Score: 56%             TREATMENT   Total Treatment time separate from Evaluation: 19 minutes    therapeutic exercises to develop strength, endurance, ROM, and flexibility for 0 minutes including:      manual therapy techniques: Joint mobilizations were applied to the: Sacroiliac joint, lumbar spine, and R hip for 9 minutes, including:    Lumbar manipulation L2-3  Prone Sacroiliac joint manipulation on R  Long axis hip distraction manipulation on R    neuromuscular re-education activities to improve: muscle firing patterns for 0 minutes. The following activities were included:      therapeutic activities to improve functional performance for 10  minutes, including:    Education below    gait training to improve functional mobility and safety for 0  minutes,  including:      Home Exercises and Patient Education Provided    Education provided:   - educated on impairments noted, plan of care, progressions in therapy, and prognosis      Written Home Exercises Provided: yes.  Exercises were reviewed and Juan Luis was able to demonstrate them prior to the end of the session.  Juan Luis demonstrated good  understanding of the education provided.     See EMR under Patient Instructions for exercises provided 10/3/2024.    Assessment   Juan Luis is a 69 y.o. male referred to outpatient Physical Therapy with a medical diagnosis of low back pain. Pt presents with R sided glute pain which can refer to his lateral thigh. He demonstrates decreased lumbar range of motion, decreased strength, impaired range of motion, impaired posture, impaired gait, impaired balance, and pain which limits his ability to complete his Activities of Daily Living and Instrumental Activities of Daily Living. His signs and symptoms are consistent with a R side Sacroiliac joint hypomobility which is contributing to his symptoms.     Pt prognosis is Good.   Pt will benefit from skilled outpatient Physical Therapy to address the deficits stated above and in the chart below, provide pt/family education, and to maximize pt's level of independence.     Plan of care discussed with patient: Yes  Pt's spiritual, cultural and educational needs considered and patient is agreeable to the plan of care and goals as stated below:     Anticipated Barriers for therapy: work schedule     Medical Necessity is demonstrated by the following  History  Co-morbidities and personal factors that may impact the plan of care [] LOW: no personal factors / co-morbidities  [x] MODERATE: 1-2 personal factors / co-morbidities  [] HIGH: 3+ personal factors / co-morbidities     Moderate / High Support Documentation: hx of prostate cancer      Examination  Body Structures and Functions, activity limitations and participation restrictions that may impact the  plan of care [] LOW: addressing 1-2 elements  [x] MODERATE: 3+ elements  [] HIGH: 3+ elements (please support below)     Moderate / High Support Documentation: decreased lumbar range of motion, decreased joint mobility, impaired posture, weakness, and pain      Clinical Presentation [] LOW: stable  [x] MODERATE: Evolving  [] HIGH: Unstable      Decision Making/ Complexity Score: Moderate         Goals:  Short term goals: 4 weeks  Patient will be independent and compliant with their HEP to improve their function  Patient will improve their ROM to at least 75% lumbar range of motion in all directions to improve his ability to complete his household chores.   Patient will improve their strength to at least a 4-/5 for all lower extremity muscle strength to improve his ability to complete household chores.      Long term goals: 8 weeks  Patient will improve their FOTO score to at least 66% as evidence of clinically significant improvements in his functional activities.   Patient will improve their ROM to at least 90% lumbar range of motion in all directions to improve his ability to complete his household chores.   Patient will improve his hip flexion to at least 115 degrees on R to improve his ability to complete deep squatting activities.   Patient will improve their strength to at least a 4/5 for all lower extremity muscle strength to improve his ability to complete household chores.   Patient will report pain no greater than a 4/10 when climbing stairs to improve his community mobility.     Plan   Plan of care Certification: 10/3/2024 to 11/28/2024.    Outpatient Physical Therapy 1 times weekly for 8 weeks to include the following interventions: Manual Therapy, Neuromuscular Re-ed, Patient Education, Therapeutic Activities, Therapeutic Exercise, and Dry Needling PRN .     Anthony Levi, PT

## 2024-10-07 ENCOUNTER — PATIENT MESSAGE (OUTPATIENT)
Dept: OTHER | Facility: OTHER | Age: 69
End: 2024-10-07
Payer: COMMERCIAL

## 2024-10-10 ENCOUNTER — PATIENT OUTREACH (OUTPATIENT)
Dept: OTHER | Facility: OTHER | Age: 69
End: 2024-10-10
Payer: COMMERCIAL

## 2024-10-10 ENCOUNTER — CLINICAL SUPPORT (OUTPATIENT)
Dept: REHABILITATION | Facility: HOSPITAL | Age: 69
End: 2024-10-10
Payer: COMMERCIAL

## 2024-10-10 DIAGNOSIS — M53.86 DECREASED RANGE OF MOTION OF LUMBAR SPINE: ICD-10-CM

## 2024-10-10 DIAGNOSIS — R29.3 ABNORMAL POSTURE: ICD-10-CM

## 2024-10-10 DIAGNOSIS — R29.898 WEAKNESS OF BOTH LOWER EXTREMITIES: Primary | ICD-10-CM

## 2024-10-10 PROCEDURE — 97112 NEUROMUSCULAR REEDUCATION: CPT | Performed by: PHYSICAL THERAPIST

## 2024-10-10 PROCEDURE — 97140 MANUAL THERAPY 1/> REGIONS: CPT | Performed by: PHYSICAL THERAPIST

## 2024-10-10 PROCEDURE — 97110 THERAPEUTIC EXERCISES: CPT | Performed by: PHYSICAL THERAPIST

## 2024-10-10 NOTE — PROGRESS NOTES
[facility]   Physical Therapy Treatment Note     Name: Juan Luis Mcgrath  Clinic Number: [id number]    Therapy Diagnosis:   Encounter Diagnoses   Name Primary?    Weakness of both lower extremities Yes    Abnormal posture     Decreased range of motion of lumbar spine      Physician: Self, Aaareferral    Visit Date: 10/10/2024    Reason for Visit:: PT eval and treat  Evaluation Date: 10/3/2024  Authorization Period Expiration: 10/3/2025  Plan of Care Expiration: 11/28/2024  Progress Note due: 11/3/2024   Visit # / Visits authorized: 1/20  FOTO Visit #:  1/3     Time In: 0902  Time Out: 1000  Total Billable Time: 52 minutes     Precautions: Standard, history of prostate cancer    SUBJECTIVE     Pt reports: he has been experiencing more pain in his glute region over the past week. He still experiences pain when standing and walking for prolonged periods of time.   He was compliant with home exercise program.  Response to previous treatment: worsened symptoms   Functional change: no change    Pain: 5/10  Location: right buttock which can extend into his lateral leg    OBJECTIVE     Objective Measures updated at progress report unless specified.     L lateral shift noted in standing    L lateral shift during gait    Supine to long sit: L lower extremity shorter in supine and long sit      Treatment     Juan Luis received the treatments listed below:       therapeutic exercises to develop strength, endurance, ROM, and flexibility for 14 minutes including:     Left lateral shift correction at wall 15x  R modified side bridge, 2x10 5 seconds  Single leg bridges, 2x10     manual therapy techniques: Joint mobilizations were applied to the: Sacroiliac joint, lumbar spine, and R hip for 26 minutes, including:     L lateral trunk shift correction   Non-rotatory lumbar mobilizations, grade III-IV  Prone Sacroiliac joint manipulation on L  R side lying breaking bread mobilization  Quadratus lumborum METs  "on L     neuromuscular re-education activities to improve: muscle firing patterns for 12 minutes. The following activities were included:    Step ups 4" step with pull down green band (pole for balance), 2x12  Sidelying clamshells green band, 2x12       therapeutic activities to improve functional performance for 0  minutes, including:        gait training to improve functional mobility and safety for 0  minutes, including:        Patient Education and Home Exercises     Home Exercises Provided and Patient Education Provided     Education provided:   - updated Home exercise program to include lateral shift correction at wall 10x every hour    Written Home Exercises Provided: Patient instructed to cont prior HEP. Exercises were reviewed and Juan Luis was able to demonstrate them prior to the end of the session.  Juan Luis demonstrated good  understanding of the education provided. See EMR under Patient Instructions for exercises provided during therapy sessions    ASSESSMENT     Juan Luis was presenting with a worsening of his glute symptoms today so he was re-evaluated. He presents with a L innominate upslip and L lateral shift which may be contributing to his R sided glute symptoms. Lateral shift corrections were performed manually and at the wall which helped his postural positioning. He did require manual correction during step up with pull down, so this exercise may need to be regressed if he continues with difficulty with postural control.    Juan Luis Is progressing well towards his goals.   Pt prognosis is Good.     Pt will continue to benefit from skilled outpatient physical therapy to address the deficits listed in the problem list box on initial evaluation, provide pt/family education and to maximize pt's level of independence in the home and community environment.     Pt's spiritual, cultural and educational needs considered and pt agreeable to plan of care and goals.     Anticipated barriers to physical therapy: work " schedule    Goals:   Short term goals: 4 weeks  Patient will be independent and compliant with their HEP to improve their function- Progressing  Patient will improve their ROM to at least 75% lumbar range of motion in all directions to improve his ability to complete his household chores. - Progressing  Patient will improve their strength to at least a 4-/5 for all lower extremity muscle strength to improve his ability to complete household chores. - Progressing     Long term goals: 8 weeks  Patient will improve their FOTO score to at least 66% as evidence of clinically significant improvements in his functional activities. - Progressing  Patient will improve their ROM to at least 90% lumbar range of motion in all directions to improve his ability to complete his household chores. - Progressing  Patient will improve his hip flexion to at least 115 degrees on R to improve his ability to complete deep squatting activities. - Progressing  Patient will improve their strength to at least a 4/5 for all lower extremity muscle strength to improve his ability to complete household chores. - Progressing  Patient will report pain no greater than a 4/10 when climbing stairs to improve his community mobility. - Progressing    PLAN     Plan of care Certification: 10/3/2024 to 11/28/2024.     Outpatient Physical Therapy 1 times weekly for 8 weeks to include the following interventions: Manual Therapy, Neuromuscular Re-ed, Patient Education, Therapeutic Activities, Therapeutic Exercise, and Dry Needling PRN .    Anthony Levi, PT   Board Certified Clinical Specialist in Orthopedic Physical Therapy  Board Certified Clinical Specialist in Sports Physical Therapy  Fellow, [employer] Manual Physical Therapists

## 2024-10-11 NOTE — PATIENT INSTRUCTIONS
Tendon Glides and Joint Blocking        Start at position A and move through each position slowly attempting to achieve full glide.  A-E is ONE repetition.     Complete 10 reps at 6-8 times per day.     PIP Flexion (Active Blocked)        Hold large knuckle straight using other hand. Bend middle joint of limited finger as far as possible. Ring and small finger   Repeat 10 times. Do 6-8 sessions per day.            DIP Flexion (Active Blocked)        Hold the  finger firmly at the middle so that only the tip joint can bend. For ring finger and small finger   Repeat 10 times. Do 6-8  sessions per day.  Copyright © I. All rights reserved.            MP Extension (Active)        With palm on table, straighten fingers completely at large knuckles, and lift fingers off table. Hold _3___ seconds.  Repeat ___15_ times. Do __5__ sessions per day.  Activity: Tap fingers one at a time on table.    Copyright © [employer]. All rights reserved.      0 = understands/communicates without difficulty

## 2024-10-12 ENCOUNTER — PATIENT MESSAGE (OUTPATIENT)
Dept: OPTOMETRY | Facility: CLINIC | Age: 69
End: 2024-10-12
Payer: COMMERCIAL

## 2024-10-17 ENCOUNTER — CLINICAL SUPPORT (OUTPATIENT)
Dept: REHABILITATION | Facility: HOSPITAL | Age: 69
End: 2024-10-17
Payer: COMMERCIAL

## 2024-10-17 DIAGNOSIS — M53.86 DECREASED RANGE OF MOTION OF LUMBAR SPINE: ICD-10-CM

## 2024-10-17 DIAGNOSIS — R29.898 WEAKNESS OF BOTH LOWER EXTREMITIES: Primary | ICD-10-CM

## 2024-10-17 DIAGNOSIS — R29.3 ABNORMAL POSTURE: ICD-10-CM

## 2024-10-17 PROCEDURE — 97140 MANUAL THERAPY 1/> REGIONS: CPT | Performed by: PHYSICAL THERAPIST

## 2024-10-17 PROCEDURE — 97110 THERAPEUTIC EXERCISES: CPT | Performed by: PHYSICAL THERAPIST

## 2024-10-17 PROCEDURE — 97112 NEUROMUSCULAR REEDUCATION: CPT | Performed by: PHYSICAL THERAPIST

## 2024-10-17 NOTE — PROGRESS NOTES
[facility]   Physical Therapy Treatment Note     Name: Juan Luis Mcgrath  Clinic Number: [id number]    Therapy Diagnosis:   Encounter Diagnoses   Name Primary?    Weakness of both lower extremities Yes    Abnormal posture     Decreased range of motion of lumbar spine      Physician: Self, Aaareferral    Visit Date: 10/17/2024    Reason for Visit:: PT eval and treat  Evaluation Date: 10/3/2024  Authorization Period Expiration: 10/3/2025  Plan of Care Expiration: 11/28/2024  Progress Note due: 11/3/2024   Visit # / Visits authorized: 2/20  FOTO Visit #:  1/3     Time In: 0902  Time Out: 1000  Total Billable Time: 54 minutes     Precautions: Standard, history of prostate cancer    SUBJECTIVE     Pt reports: he started taking Meloxicam which has helped reduce his symptoms. He is still aware of his posture being off  He was compliant with home exercise program.  Response to previous treatment: worsened symptoms   Functional change: no change    Pain: 5/10  Location: right buttock which can extend into his lateral leg    OBJECTIVE     Objective Measures updated at progress report unless specified.     L lateral shift noted in standing    L lateral shift during gait    Supine to long sit: L lower extremity shorter in supine and long sit      Treatment     Juan Luis received the treatments listed below:       therapeutic exercises to develop strength, endurance, ROM, and flexibility for 24 minutes including:     Gait/posture assessment  Left lateral shift correction at wall 15x  R modified side bridge, 12x 10 seconds  Single leg bridges, 2x8     manual therapy techniques: Joint mobilizations were applied to the: Sacroiliac joint, lumbar spine, and R hip for 20 minutes, including:     L lateral trunk shift correction   Non-rotatory lumbar mobilizations, grade III-IV  Prone Sacroiliac joint manipulation on L  R side lying breaking bread mobilization  Quadratus lumborum METs on L     neuromuscular  "re-education activities to improve: muscle firing patterns for 10 minutes. The following activities were included:    Palloff press green band, 2x15    Not today:  Step ups 4" step with pull down green band (pole for balance), 2x12  Sidelying clamshells green band, 2x12       therapeutic activities to improve functional performance for 0  minutes, including:        gait training to improve functional mobility and safety for 0  minutes, including:        Patient Education and Home Exercises     Home Exercises Provided and Patient Education Provided     Education provided:   - updated Home exercise program to include lateral shift correction at wall 10x every hour    Written Home Exercises Provided: yes.. Exercises were reviewed and Juan Luis was able to demonstrate them prior to the end of the session.  Juan Luis demonstrated good  understanding of the education provided. See EMR under Patient Instructions for exercises provided during therapy sessions    ASSESSMENT     Juan Luis remains with similar posture impairments with a L lateral shift noted in static posture which worsens with gait. He remains with an upslip on L which I believe is contributing to his posture and gait deviations so a heel lift was provided to him in his L shoe to help normalize his posture. This may be used in the short term, then we will transition away from this when his posture improves. The single leg bridges irritated his symptoms today so this was regressed to double leg bridges. His Home exercise program was updated with his new lateral shift corrections and core strengthening.     Juan Luis Is progressing well towards his goals.   Pt prognosis is Good.     Pt will continue to benefit from skilled outpatient physical therapy to address the deficits listed in the problem list box on initial evaluation, provide pt/family education and to maximize pt's level of independence in the home and community environment.     Pt's spiritual, cultural and educational " needs considered and pt agreeable to plan of care and goals.     Anticipated barriers to physical therapy: work schedule    Goals:   Short term goals: 4 weeks  Patient will be independent and compliant with their HEP to improve their function- Progressing  Patient will improve their ROM to at least 75% lumbar range of motion in all directions to improve his ability to complete his household chores. - Progressing  Patient will improve their strength to at least a 4-/5 for all lower extremity muscle strength to improve his ability to complete household chores. - Progressing     Long term goals: 8 weeks  Patient will improve their FOTO score to at least 66% as evidence of clinically significant improvements in his functional activities. - Progressing  Patient will improve their ROM to at least 90% lumbar range of motion in all directions to improve his ability to complete his household chores. - Progressing  Patient will improve his hip flexion to at least 115 degrees on R to improve his ability to complete deep squatting activities. - Progressing  Patient will improve their strength to at least a 4/5 for all lower extremity muscle strength to improve his ability to complete household chores. - Progressing  Patient will report pain no greater than a 4/10 when climbing stairs to improve his community mobility. - Progressing    PLAN     Plan of care Certification: 10/3/2024 to 11/28/2024.     Outpatient Physical Therapy 1 times weekly for 8 weeks to include the following interventions: Manual Therapy, Neuromuscular Re-ed, Patient Education, Therapeutic Activities, Therapeutic Exercise, and Dry Needling PRN .    Anthony Levi, PT   Board Certified Clinical Specialist in Orthopedic Physical Therapy  Board Certified Clinical Specialist in Sports Physical Therapy  Fellow, [employer] Orth[employer] Manual Physical Therapists

## 2024-12-03 ENCOUNTER — OFFICE VISIT (OUTPATIENT)
Dept: SPORTS MEDICINE | Facility: CLINIC | Age: 69
End: 2024-12-03
Payer: COMMERCIAL

## 2024-12-03 ENCOUNTER — HOSPITAL ENCOUNTER (OUTPATIENT)
Dept: RADIOLOGY | Facility: HOSPITAL | Age: 69
Discharge: HOME | End: 2024-12-03
Attending: ORTHOPAEDIC SURGERY
Payer: COMMERCIAL

## 2024-12-03 VITALS
HEART RATE: 61 BPM | SYSTOLIC BLOOD PRESSURE: 137 MMHG | WEIGHT: 197.88 LBS | DIASTOLIC BLOOD PRESSURE: 83 MMHG | HEIGHT: 70 IN | BODY MASS INDEX: 28.33 KG/M2

## 2024-12-03 DIAGNOSIS — M25.512 ARTHRALGIA OF LEFT ACROMIOCLAVICULAR JOINT: ICD-10-CM

## 2024-12-03 DIAGNOSIS — M25.512 LEFT SHOULDER PAIN, UNSPECIFIED CHRONICITY: ICD-10-CM

## 2024-12-03 DIAGNOSIS — M25.512 CHRONIC LEFT SHOULDER PAIN: ICD-10-CM

## 2024-12-03 DIAGNOSIS — M75.42 IMPINGEMENT SYNDROME OF LEFT SHOULDER: ICD-10-CM

## 2024-12-03 DIAGNOSIS — G89.29 CHRONIC LEFT SHOULDER PAIN: ICD-10-CM

## 2024-12-03 DIAGNOSIS — M19.012 ARTHRITIS OF LEFT ACROMIOCLAVICULAR JOINT: Primary | ICD-10-CM

## 2024-12-03 PROCEDURE — 20611 DRAIN/INJ JOINT/BURSA W/US: CPT | Mod: LT,S$GLB,, | Performed by: ORTHOPAEDIC SURGERY

## 2024-12-03 PROCEDURE — 73030 X-RAY EXAM OF SHOULDER: CPT | Mod: TC,LT

## 2024-12-03 PROCEDURE — 73030 X-RAY EXAM OF SHOULDER: CPT | Mod: 26,LT,, | Performed by: RADIOLOGY

## 2024-12-03 PROCEDURE — 3079F DIAST BP 80-89 MM HG: CPT | Mod: CPTII,S$GLB,, | Performed by: ORTHOPAEDIC SURGERY

## 2024-12-03 PROCEDURE — 3075F SYST BP GE 130 - 139MM HG: CPT | Mod: CPTII,S$GLB,, | Performed by: ORTHOPAEDIC SURGERY

## 2024-12-03 PROCEDURE — 99999 PR PBB SHADOW E&M-EST. PATIENT-LVL III: CPT | Mod: PBBFAC,,, | Performed by: ORTHOPAEDIC SURGERY

## 2024-12-03 PROCEDURE — 1159F MED LIST DOCD IN RCRD: CPT | Mod: CPTII,S$GLB,, | Performed by: ORTHOPAEDIC SURGERY

## 2024-12-03 PROCEDURE — 3288F FALL RISK ASSESSMENT DOCD: CPT | Mod: CPTII,S$GLB,, | Performed by: ORTHOPAEDIC SURGERY

## 2024-12-03 PROCEDURE — 99214 OFFICE O/P EST MOD 30 MIN: CPT | Mod: 25,S$GLB,, | Performed by: ORTHOPAEDIC SURGERY

## 2024-12-03 PROCEDURE — 1125F AMNT PAIN NOTED PAIN PRSNT: CPT | Mod: CPTII,S$GLB,, | Performed by: ORTHOPAEDIC SURGERY

## 2024-12-03 PROCEDURE — 3008F BODY MASS INDEX DOCD: CPT | Mod: CPTII,S$GLB,, | Performed by: ORTHOPAEDIC SURGERY

## 2024-12-03 PROCEDURE — 3044F HG A1C LEVEL LT 7.0%: CPT | Mod: CPTII,S$GLB,, | Performed by: ORTHOPAEDIC SURGERY

## 2024-12-03 PROCEDURE — 1101F PT FALLS ASSESS-DOCD LE1/YR: CPT | Mod: CPTII,S$GLB,, | Performed by: ORTHOPAEDIC SURGERY

## 2024-12-03 RX ORDER — TRIAMCINOLONE ACETONIDE 40 MG/ML
40 INJECTION, SUSPENSION INTRA-ARTICULAR; INTRAMUSCULAR
Status: DISCONTINUED | OUTPATIENT
Start: 2024-12-03 | End: 2024-12-03 | Stop reason: HOSPADM

## 2024-12-03 RX ADMIN — TRIAMCINOLONE ACETONIDE 40 MG: 40 INJECTION, SUSPENSION INTRA-ARTICULAR; INTRAMUSCULAR at 04:12

## 2024-12-03 NOTE — PROGRESS NOTES
CC: Left shoulder pain     69 y.o. Male who returns with a new complaint of left shoulder pain for the last 4 weeks or so. 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 Patient is right hand dominant.  Localizes pain over the anterior superior shoulder with insidious onset related to some overhead lifting and exercise activity.  Worse with direct pressure over the AC joint and lying on his left side at night.  Feels subjective impingement.  Denies any discrete injury event.  No subjective weakness.  No neck complaints.  No cervical radicular symptoms.  No history of prior pain or problems.  Treatment has included rest, activity modifications and over-the-counter oral medication.    Negative for tobacco.   Negative for diabetes. Last A1C: 5.3 04/02/24    PAST MEDICAL HISTORY:   Past Medical History:   Diagnosis Date    Degenerative joint disease (DJD) of lumbar spine     History of cold sores     History of skin cancer 8/19/2024    Prostate cancer 2013    Sacroiliitis 04/07/2021    Squamous cell carcinoma 09/23/2019     PAST SURGICAL HISTORY:  Past Surgical History:   Procedure Laterality Date    ANTERIOR CRUCIATE LIGAMENT REPAIR      APPENDECTOMY      FINGER AMPUTATION Right 9/23/2019    Procedure: AMPUTATION, FINGER;  Surgeon: Luna Tirado MD;  Location: [facility];  Service: Orthopedics;  Laterality: Right;  regional MAC    FINGER MASS EXCISION Right 9/9/2019    Procedure: EXCISION, MASS, FINGER small finger resection;  Surgeon: Luna Tirado MD;  Location: [facility] OR;  Service: Orthopedics;  Laterality: Right;  regional MAC    INJECTION OF JOINT Right 7/22/2020    Procedure: INJECTION RIGHT SI JOINT AND RIGHT PIRIFORMIS INJECTION;  Surgeon: Roosevelt Reddy MD;  Location: [facility] PAIN T;  Service: Pain Management;  Laterality: Right;  NEEDS CONSENT, URGENT    INJECTION OF JOINT Right 4/7/2021    Procedure: INJECTION, JOINT, SI;  Surgeon: Roosevelt Reddy MD;  Location: [facility] PAIN T;  Service: Pain  "Management;  Laterality: Right;    PROSTATECTOMY      radical    REMOVAL OF NAIL OF DIGIT Right 8/19/2019    Procedure: REMOVAL, NAIL, DIGIT right small finger;  Surgeon: Luna Tirado MD;  Location: [facility] OR;  Service: Orthopedics;  Laterality: Right;    TRANSFORAMINAL EPIDURAL INJECTION OF STEROID Right 10/4/2023    Procedure: INJECTION, STEROID, EPIDURAL, TRANSFORAMINAL APPROACH, RIGHT L4/L5 AND L5/S1;  Surgeon: Roosevelt Reddy MD;  Location: [facility] PAIN MGT;  Service: Pain Management;  Laterality: Right;     FAMILY HISTORY:  Family History   Problem Relation Name Age of Onset    No Known Problems Mother      Prostate cancer Father      Prostate cancer Maternal Uncle      Melanoma Neg Hx      Colon cancer Neg Hx       MEDICATIONS:    Current Outpatient Medications:     sildenafiL (VIAGRA) 50 MG tablet, Take 50 mg by mouth. as directed, Disp: , Rfl:   No current facility-administered medications for this visit.    Facility-Administered Medications Ordered in Other Visits:     0.9%  NaCl infusion, , Intravenous, Continuous, Anthony Juan MD    ALLERGIES:  Review of patient's allergies indicates:  No Known Allergies     REVIEW OF SYSTEMS:  Constitution: Negative. Negative for chills, fever and night sweats.    Hematologic/Lymphatic: Negative for bleeding problem. Does not bruise/bleed easily.   Skin: Negative for dry skin, itching and rash.   Musculoskeletal: Negative for falls. Positive for left shoulder pain and muscle weakness.     All other review of symptoms were reviewed and found to be noncontributory.    PHYSICAL EXAMINATION:  Vitals:  /83   Pulse 61   Ht 5' 10" (1.778 m)   Wt 89.8 kg (197 lb 13.8 oz)   BMI 28.39 kg/m²    General: Well-developed well-nourished 69 y.o. malein no acute distress   Cardiovascular: Regular rhythm by palpation of distal pulse, normal color and temperature, no concerning varicosities on symptomatic side   Lungs: No labored breathing or wheezing " appreciated   Neuro: Alert and oriented ×3   Psychiatric: well oriented to person, place and time, demonstrates normal mood and affect   Skin: No rashes, lesions or ulcers, normal temperature, turgor, and texture on uninvolved extremity    Ortho/SPM Exam  Examination of the left shoulder demonstrates some mild swelling specifically over the AC joint.  Prominent distal clavicle superior spur.  Pain to palpation of the AC joint.  Provoked with cross chest adduction and reaching behind himself.  Mild tenderness over the proximal biceps groove but more so also over the anterior lateral subdeltoid recess.  5- out of 5 resisted scaption.  No pain on resisted testing for the most part.  5/5 resisted external rotation with arm at side.  Positive bear hug test for typical pain.  Positive modified speed's test.  No pain specifically over the posterior glenohumeral joint line to direct palpation.  Negative compression rotation test.  Positive Neer and Mahoney impingement maneuvers.  Intact belly press test.    IMAGING:  Xrays including AP, Outlet and Axillary Lateral of Left shoulder are ordered / images reviewed by me:   Moderate AC arthropathy.  Mild glenohumeral degenerative changes.  Prominent subacromial spur.    ASSESSMENT:      ICD-10-CM ICD-9-CM   1. Arthritis of left acromioclavicular joint  M19.012 716.91   2. Arthralgia of left acromioclavicular joint  M25.512 719.41   3. Impingement syndrome of left shoulder  M75.42 726.2   4. Chronic left shoulder pain  M25.512 719.41    G89.29 338.29     PLAN:     -Findings and treatment options were discussed with the patient.  Findings demonstrate some degree of symptomatic AC arthropathy along with external impingement syndrome.  The patient has a prominent subacromial spur.  Good cuff strength on exam and no significant pain to resisted scaption.  Conservative treatment has been limited.  I have recommended consideration for a corticosteroid injection.  Ultrasound-guided  injection provided over the AC joint.  The patient noted immediate pain relief but still had some pain over the lateral subdeltoid recess with overhead reaching activity.  Thus I provided a subacromial steroid injection as well.  Discussed activity modifications.  May take Mobic as needed.  -RTC PRN.  If pain is persistent or recurrent despite these measures, next step would be to consider MRI.  -All questions answered    Intermediate Joint Aspiration/Injection: L acromioclavicular    Date/Time: 12/3/2024 4:00 PM    Performed by: LAUREN Chambers MD  Authorized by: LAUREN Chambers MD    Consent Done?:  Yes (Verbal)  Indications:  Pain  Site marked: The procedure site was marked    Timeout: Prior to procedure the correct patient, procedure, and site was verified      Location:  Shoulder  Site:  L acromioclavicular  Prep: Patient was prepped and draped in usual sterile fashion    Ultrasonic Guidance for needle placement: Yes  Images are saved and documented.    Needle size:  22 G  Medications:  40 mg triamcinolone acetonide 40 mg/mL  Patient tolerance:  Patient tolerated the procedure well with no immediate complications       Additional Comments: The ultrasound was used to gain visualization over the acromioclavicular joint.  A representative picture was taken and saved showing the distal clavicle and anatomy along with the needle introduced into the AC joint for the steroid injection.   Large Joint Aspiration/Injection: L subacromial bursa    Date/Time: 12/3/2024 4:00 PM    Performed by: LAUREN Chambers MD  Authorized by: LAUREN Chambers MD    Consent Done?:  Yes (Verbal)  Indications:  Pain  Site marked: the procedure site was marked    Timeout: prior to procedure the correct patient, procedure, and site was verified    Prep: patient was prepped and draped in usual sterile fashion      Local anesthesia used?: Yes    Local anesthetic:  Co-phenylcaine spray (0.2% Naropin)  Anesthetic total (ml):   4      Details:  Needle Size:  22 G  Ultrasonic Guidance for needle placement?: No    Approach:  Posterior  Location:  Shoulder  Site:  L subacromial bursa  Medications:  40 mg triamcinolone acetonide 40 mg/mL  Patient tolerance:  Patient tolerated the procedure well with no immediate complications

## 2024-12-09 DIAGNOSIS — G89.29 CHRONIC LEFT SHOULDER PAIN: ICD-10-CM

## 2024-12-09 DIAGNOSIS — M25.512 CHRONIC LEFT SHOULDER PAIN: ICD-10-CM

## 2024-12-09 DIAGNOSIS — M75.42 IMPINGEMENT SYNDROME OF LEFT SHOULDER: ICD-10-CM

## 2024-12-09 DIAGNOSIS — M19.012 ARTHRITIS OF LEFT ACROMIOCLAVICULAR JOINT: Primary | ICD-10-CM

## 2024-12-30 ENCOUNTER — PATIENT MESSAGE (OUTPATIENT)
Dept: SPORTS MEDICINE | Facility: CLINIC | Age: 69
End: 2024-12-30
Payer: COMMERCIAL

## 2024-12-30 DIAGNOSIS — M19.012 ARTHRITIS OF LEFT ACROMIOCLAVICULAR JOINT: ICD-10-CM

## 2024-12-30 DIAGNOSIS — M25.519 PAIN IN UNSPECIFIED SHOULDER: ICD-10-CM

## 2024-12-30 DIAGNOSIS — G89.29 CHRONIC LEFT SHOULDER PAIN: ICD-10-CM

## 2024-12-30 DIAGNOSIS — M75.42 IMPINGEMENT SYNDROME OF LEFT SHOULDER: ICD-10-CM

## 2024-12-30 DIAGNOSIS — M25.512 CHRONIC LEFT SHOULDER PAIN: ICD-10-CM

## 2024-12-30 DIAGNOSIS — M25.512 ARTHRALGIA OF LEFT ACROMIOCLAVICULAR JOINT: ICD-10-CM

## 2024-12-30 NOTE — TELEPHONE ENCOUNTER
Called and left a voicemail for pt to call back and schedule his MRI and then a follow up to review the results. Left the number to clinic for pt

## 2024-12-31 ENCOUNTER — HOSPITAL ENCOUNTER (OUTPATIENT)
Dept: RADIOLOGY | Facility: HOSPITAL | Age: 69
Discharge: HOME | End: 2024-12-31
Attending: ORTHOPAEDIC SURGERY
Payer: COMMERCIAL

## 2024-12-31 DIAGNOSIS — M25.512 ARTHRALGIA OF LEFT ACROMIOCLAVICULAR JOINT: ICD-10-CM

## 2024-12-31 DIAGNOSIS — M25.512 CHRONIC LEFT SHOULDER PAIN: ICD-10-CM

## 2024-12-31 DIAGNOSIS — M75.42 IMPINGEMENT SYNDROME OF LEFT SHOULDER: ICD-10-CM

## 2024-12-31 DIAGNOSIS — M19.012 ARTHRITIS OF LEFT ACROMIOCLAVICULAR JOINT: ICD-10-CM

## 2024-12-31 DIAGNOSIS — G89.29 CHRONIC LEFT SHOULDER PAIN: ICD-10-CM

## 2024-12-31 PROCEDURE — 73221 MRI JOINT UPR EXTREM W/O DYE: CPT | Mod: 26,LT,, | Performed by: RADIOLOGY

## 2024-12-31 PROCEDURE — 73221 MRI JOINT UPR EXTREM W/O DYE: CPT | Mod: TC,LT

## 2025-01-02 ENCOUNTER — TELEPHONE (OUTPATIENT)
Dept: SPORTS MEDICINE | Facility: CLINIC | Age: 70
End: 2025-01-02
Payer: COMMERCIAL

## 2025-01-02 DIAGNOSIS — M75.112 NONTRAUMATIC INCOMPLETE TEAR OF LEFT ROTATOR CUFF: Primary | ICD-10-CM

## 2025-01-02 DIAGNOSIS — M19.012 ARTHRITIS OF LEFT ACROMIOCLAVICULAR JOINT: ICD-10-CM

## 2025-01-03 NOTE — TELEPHONE ENCOUNTER
I called the patient to discuss MRI findings as evening.  Findings documented as below.  I do agree with the read.  It sounds like the AC joint steroid injection did provide essentially complete pain relief for a day or two but pain was recurrent.  He did go back to pushups and overhead lifting within a few days of the injection which I think is relevant.  Nonetheless MRI results reviewed.  He does have some high-grade partial tearing of the rotator cuff supraspinatus tendon in addition to some areas of high-grade chondral loss of the glenohumeral joint and AC arthropathy.  Discussed both operative and nonoperative treatment options.  He has been following a self-directed home therapy program.  Overall symptoms have been present just for the last 8 weeks or so.  We both agree to pursue some additional conservative care.  Pain localized also deep in the joint.  Discussed the potential benefit of a glenohumeral steroid injection.  He does wish to proceed.  We will provide that next week.  PT referral to [facility] for a formal course of PT at this point.  I will see him back at next week for the injection.  He does understand that if he has continued pain or problems despite these measures, we will have to consider arthroscopy for rotator cuff repair, extensive debridement, biceps tenodesis, distal clavicle excision.  This would be at least 3 months after this most recent steroid injection and I would not recommend any further injections after the the glenohumeral injection.    MRI left shoulder 12/31/24:  Impression:  1. Supraspinatus tendinosis with a medium-sized, high-grade partial-thickness interstitial/concealed footprint tear with suspected bursal surface extension.  No retraction or volume loss.  2. Subscapularis tendinosis with a partial-thickness intrasubstance/concealed footprint tear.  3. Infraspinatus tendinosis.  4. Degenerative fraying/tearing of the superior labrum.  5. Mild glenohumeral osteoarthritis  with a 1.4 x 1.4 cm full-thickness chondral defect of the humeral head.  6. AC joint arthrosis and prominent subacromial spurring.  7. Joint effusion with synovitis.  8. 1.2 x 0.7 cm loose body at the subscapularis recess, likely osteocartilaginous in nature.  9. Subacromial/subdeltoid bursitis.

## 2025-01-07 NOTE — PROGRESS NOTES
CC: Left shoulder follow up     Juan Luis Mcgrath, presents today for follow up appointment of his left shoulder. Patient does not report any new incidents or injuries since their last appointment. Pain and symptoms remain unchanged since his last appointment. Here today to for injection.    Prior Hx 12/3/2024:    69 y.o. Male who returns with a new complaint of left shoulder pain for the last 4 weeks or so. 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 Patient is right hand dominant.  Localizes pain over the anterior superior shoulder with insidious onset related to some overhead lifting and exercise activity.  Worse with direct pressure over the AC joint and lying on his left side at night.  Feels subjective impingement.  Denies any discrete injury event.  No subjective weakness.  No neck complaints.  No cervical radicular symptoms.  No history of prior pain or problems.  Treatment has included rest, activity modifications and over-the-counter oral medication.    Negative for tobacco.   Negative for diabetes. Last A1C: 5.3 04/02/24    PAST MEDICAL HISTORY:   Past Medical History:   Diagnosis Date    Degenerative joint disease (DJD) of lumbar spine     History of cold sores     History of skin cancer 8/19/2024    Prostate cancer 2013    Sacroiliitis 04/07/2021    Squamous cell carcinoma 09/23/2019     PAST SURGICAL HISTORY:  Past Surgical History:   Procedure Laterality Date    ANTERIOR CRUCIATE LIGAMENT REPAIR      APPENDECTOMY      FINGER AMPUTATION Right 9/23/2019    Procedure: AMPUTATION, FINGER;  Surgeon: Luna Tirado MD;  Location: [facility] OR;  Service: Orthopedics;  Laterality: Right;  regional MAC    FINGER MASS EXCISION Right 9/9/2019    Procedure: EXCISION, MASS, FINGER small finger resection;  Surgeon: Luna Tirado MD;  Location: [facility] OR;  Service: Orthopedics;  Laterality: Right;  regional MAC    INJECTION OF JOINT Right 7/22/2020    Procedure: INJECTION RIGHT SI JOINT AND RIGHT PIRIFORMIS  "INJECTION;  Surgeon: Roosevelt Reddy MD;  Location: [facility] PAIN MGT;  Service: Pain Management;  Laterality: Right;  NEEDS CONSENT, URGENT    INJECTION OF JOINT Right 4/7/2021    Procedure: INJECTION, JOINT, SI;  Surgeon: Roosevelt Reddy MD;  Location: [facility] PAIN MGT;  Service: Pain Management;  Laterality: Right;    PROSTATECTOMY      radical    REMOVAL OF NAIL OF DIGIT Right 8/19/2019    Procedure: REMOVAL, NAIL, DIGIT right small finger;  Surgeon: Luna Tirado MD;  Location: [facility] OR;  Service: Orthopedics;  Laterality: Right;    TRANSFORAMINAL EPIDURAL INJECTION OF STEROID Right 10/4/2023    Procedure: INJECTION, STEROID, EPIDURAL, TRANSFORAMINAL APPROACH, RIGHT L4/L5 AND L5/S1;  Surgeon: Roosevelt Reddy MD;  Location: [facility] PAIN T;  Service: Pain Management;  Laterality: Right;     FAMILY HISTORY:  Family History   Problem Relation Name Age of Onset    No Known Problems Mother      Prostate cancer Father      Prostate cancer Maternal Uncle      Melanoma Neg Hx      Colon cancer Neg Hx       MEDICATIONS:    Current Outpatient Medications:     sildenafiL (VIAGRA) 50 MG tablet, Take 50 mg by mouth. as directed, Disp: , Rfl:   No current facility-administered medications for this visit.    Facility-Administered Medications Ordered in Other Visits:     0.9%  NaCl infusion, , Intravenous, Continuous, Anthony Juan MD    ALLERGIES:  Review of patient's allergies indicates:  No Known Allergies     REVIEW OF SYSTEMS:  Constitution: Negative. Negative for chills, fever and night sweats.    Hematologic/Lymphatic: Negative for bleeding problem. Does not bruise/bleed easily.   Skin: Negative for dry skin, itching and rash.   Musculoskeletal: Negative for falls. Positive for left shoulder pain and muscle weakness.     All other review of symptoms were reviewed and found to be noncontributory.    PHYSICAL EXAMINATION:  Vitals:  /88   Pulse (!) 54   Ht 5' 10" (1.778 m)   Wt 87.2 kg (192 lb 3.9 oz)  "  BMI 27.58 kg/m²    General: Well-developed well-nourished 69 y.o. malein no acute distress   Cardiovascular: Regular rhythm by palpation of distal pulse, normal color and temperature, no concerning varicosities on symptomatic side   Lungs: No labored breathing or wheezing appreciated   Neuro: Alert and oriented ×3   Psychiatric: well oriented to person, place and time, demonstrates normal mood and affect   Skin: No rashes, lesions or ulcers, normal temperature, turgor, and texture on uninvolved extremity    Ortho/SPM Exam  Examination of the left shoulder again demonstrates some mild swelling specifically over the AC joint.  Prominent distal clavicle superior spur.  Pain to palpation of the AC joint.  Negative cross chest adduction for pain on repeat testing today.  Mild tenderness over the proximal biceps groove but more so also over the anterior lateral subdeltoid recess.  5- out of 5 resisted scaption.  No pain on resisted testing for the most part.  5/5 resisted external rotation with arm at side.  Positive bear hug test for typical pain.  Positive modified speed's test.  No pain specifically over the posterior glenohumeral joint line to direct palpation.  Positive compression rotation test today for clicking and popping.  Positive jerk test.  Intact belly press test.    IMAGING:  Xrays 12/03/24 including AP, Outlet and Axillary Lateral of Left shoulder are ordered / images reviewed by me:   Moderate AC arthropathy.  Mild glenohumeral degenerative changes.  Prominent subacromial spur.    MRI 12/31/24 of left shoulder reviewed by me and discussed with patient. Study shows:   1. Supraspinatus tendinosis with a medium-sized, high-grade partial-thickness interstitial/concealed footprint tear with suspected bursal surface extension.  No retraction or volume loss.  2. Subscapularis tendinosis with a partial-thickness intrasubstance/concealed footprint tear.  3. Infraspinatus tendinosis.  4. Degenerative  fraying/tearing of the superior labrum.  5. Mild glenohumeral osteoarthritis with a 1.4 x 1.4 cm full-thickness chondral defect of the humeral head.  6. AC joint arthrosis and prominent subacromial spurring.  7. Joint effusion with synovitis.  8. 1.2 x 0.7 cm loose body at the subscapularis recess, likely osteocartilaginous in nature.  9. Subacromial/subdeltoid bursitis.    ASSESSMENT:      ICD-10-CM ICD-9-CM   1. Superior glenoid labrum lesion of left shoulder, initial encounter  S43.432A 840.7   2. Chondromalacia of left shoulder  M94.212 733.92   3. Nontraumatic incomplete tear of left rotator cuff  M75.112 726.13   4. Arthritis of left acromioclavicular joint  M19.012 716.91       PLAN:     Imaging reviewed with the patient.  Repeat exam again shows some ongoing symptoms related to the AC joint but also deeper in the shoulder which I feel is more consistent with symptomatic glenohumeral degenerative changes.  Chondral disease with SLAP pathology. Pain from different locations related to wear and tear of the shoulder. He has limited pain on resisted scaption.  Discussed the significance of his high-grade partial bursal sided cuff tear as well.  The hope would be to avoid surgery if possible.  As before we have discussed nonoperative treatment options as well.  Glenohumeral steroid injection provided today.  Discussed exercise and activity modifications.  He will have 1 PT visit with my PT colleague Primomaricarmen Chadwickdhaval to set up a home rehab program.  Discussed avoidance of heavy overhead lifting, pushing or pulling where he can not see his hands.  He verbalized understanding.  I will see him back in 6-8 weeks as needed.    Prescriptions given for both Mobic and Robaxin.    Large Joint Aspiration/Injection: L glenohumeral    Date/Time: 1/9/2025 8:30 AM    Performed by: LAUREN Chambers MD  Authorized by: LAUREN Chambers MD    Consent Done?:  Yes (Verbal)  Indications:  Pain  Site marked: the procedure site was  marked    Timeout: prior to procedure the correct patient, procedure, and site was verified    Prep: patient was prepped and draped in usual sterile fashion      Local anesthesia used?: Yes    Local anesthetic:  Co-phenylcaine spray (0.2% Naropin)  Anesthetic total (ml):  4      Details:  Needle Size:  22 G  Approach:  Superior  Location:  Shoulder  Site:  L glenohumeral  Medications:  40 mg triamcinolone acetonide 40 mg/mL  Patient tolerance:  Patient tolerated the procedure well with no immediate complications

## 2025-01-08 ENCOUNTER — TELEPHONE (OUTPATIENT)
Dept: SPORTS MEDICINE | Facility: CLINIC | Age: 70
End: 2025-01-08
Payer: COMMERCIAL

## 2025-01-08 NOTE — TELEPHONE ENCOUNTER
----- Message from PT Primo sent at 1/8/2025  2:40 PM CST -----  Dr. Chambers,    Just letting you know we've called and LVM for this patient as well as sent portal messages to get scheduled. He hasn't responded at this time. If he does show tomorrow, please walk him over to the clinic and I'll see if he wants me to create a HEP.     He has a history of seeing Byron Levi at the [facility] in the past, so he may request to be scheduled there instead.     Primo  ----- Message -----  From: Clarisse Ramos  Sent: 1/3/2025   7:23 AM CST  To: LAUREN Chambers MD; Primo Nassar PT, DPT    Pt scheduled to for f/u appt 01/09 at 0830.     ET  ----- Message -----  From: LAUREN Chambers MD  Sent: 1/2/2025   6:42 PM CST  To: Primo Nassar PT, DPT; Clarisse Robb,     Patient is coming in next Thursday at 8:30 a.m. for a left glenohumeral steroid injection.  No need to call him.  I placed a new referral to [facility] for PT.     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  He wants to do a formal therapy program at this point.  Can you get him scheduled?    SC

## 2025-01-09 ENCOUNTER — OFFICE VISIT (OUTPATIENT)
Dept: SPORTS MEDICINE | Facility: CLINIC | Age: 70
End: 2025-01-09
Payer: COMMERCIAL

## 2025-01-09 VITALS
SYSTOLIC BLOOD PRESSURE: 134 MMHG | HEIGHT: 70 IN | DIASTOLIC BLOOD PRESSURE: 88 MMHG | BODY MASS INDEX: 27.52 KG/M2 | HEART RATE: 54 BPM | WEIGHT: 192.25 LBS

## 2025-01-09 DIAGNOSIS — M94.212 CHONDROMALACIA OF LEFT SHOULDER: ICD-10-CM

## 2025-01-09 DIAGNOSIS — S43.432A SUPERIOR GLENOID LABRUM LESION OF LEFT SHOULDER, INITIAL ENCOUNTER: Primary | ICD-10-CM

## 2025-01-09 DIAGNOSIS — M75.112 NONTRAUMATIC INCOMPLETE TEAR OF LEFT ROTATOR CUFF: ICD-10-CM

## 2025-01-09 DIAGNOSIS — M19.012 ARTHRITIS OF LEFT ACROMIOCLAVICULAR JOINT: ICD-10-CM

## 2025-01-09 PROCEDURE — 99999 PR PBB SHADOW E&M-EST. PATIENT-LVL III: CPT | Mod: PBBFAC,,, | Performed by: ORTHOPAEDIC SURGERY

## 2025-01-09 PROCEDURE — 99214 OFFICE O/P EST MOD 30 MIN: CPT | Mod: 25,S$GLB,, | Performed by: ORTHOPAEDIC SURGERY

## 2025-01-09 PROCEDURE — 1126F AMNT PAIN NOTED NONE PRSNT: CPT | Mod: CPTII,S$GLB,, | Performed by: ORTHOPAEDIC SURGERY

## 2025-01-09 PROCEDURE — 1101F PT FALLS ASSESS-DOCD LE1/YR: CPT | Mod: CPTII,S$GLB,, | Performed by: ORTHOPAEDIC SURGERY

## 2025-01-09 PROCEDURE — 3008F BODY MASS INDEX DOCD: CPT | Mod: CPTII,S$GLB,, | Performed by: ORTHOPAEDIC SURGERY

## 2025-01-09 PROCEDURE — 3075F SYST BP GE 130 - 139MM HG: CPT | Mod: CPTII,S$GLB,, | Performed by: ORTHOPAEDIC SURGERY

## 2025-01-09 PROCEDURE — 1159F MED LIST DOCD IN RCRD: CPT | Mod: CPTII,S$GLB,, | Performed by: ORTHOPAEDIC SURGERY

## 2025-01-09 PROCEDURE — 3079F DIAST BP 80-89 MM HG: CPT | Mod: CPTII,S$GLB,, | Performed by: ORTHOPAEDIC SURGERY

## 2025-01-09 PROCEDURE — 3288F FALL RISK ASSESSMENT DOCD: CPT | Mod: CPTII,S$GLB,, | Performed by: ORTHOPAEDIC SURGERY

## 2025-01-09 PROCEDURE — 20610 DRAIN/INJ JOINT/BURSA W/O US: CPT | Mod: LT,S$GLB,, | Performed by: ORTHOPAEDIC SURGERY

## 2025-01-09 RX ORDER — TRIAMCINOLONE ACETONIDE 40 MG/ML
40 INJECTION, SUSPENSION INTRA-ARTICULAR; INTRAMUSCULAR
Status: DISCONTINUED | OUTPATIENT
Start: 2025-01-09 | End: 2025-01-09 | Stop reason: HOSPADM

## 2025-01-09 RX ORDER — METHOCARBAMOL 500 MG/1
500 TABLET, FILM COATED ORAL 3 TIMES DAILY
Qty: 60 TABLET | Refills: 0 | Status: SHIPPED | OUTPATIENT
Start: 2025-01-09 | End: 2025-01-29

## 2025-01-09 RX ORDER — MELOXICAM 15 MG/1
15 TABLET ORAL DAILY
Qty: 30 TABLET | Refills: 2 | Status: SHIPPED | OUTPATIENT
Start: 2025-01-09

## 2025-01-09 RX ADMIN — TRIAMCINOLONE ACETONIDE 40 MG: 40 INJECTION, SUSPENSION INTRA-ARTICULAR; INTRAMUSCULAR at 08:01

## 2025-02-13 ENCOUNTER — CLINICAL SUPPORT (OUTPATIENT)
Dept: REHABILITATION | Facility: HOSPITAL | Age: 70
End: 2025-02-13
Payer: COMMERCIAL

## 2025-02-13 DIAGNOSIS — M25.512 ACUTE PAIN OF LEFT SHOULDER: Primary | ICD-10-CM

## 2025-02-13 PROBLEM — R29.898 WEAKNESS OF BOTH LOWER EXTREMITIES: Status: RESOLVED | Noted: 2021-01-14 | Resolved: 2025-02-13

## 2025-02-13 PROBLEM — R29.3 ABNORMAL POSTURE: Status: RESOLVED | Noted: 2024-10-03 | Resolved: 2025-02-13

## 2025-02-13 PROBLEM — M53.2X7: Status: RESOLVED | Noted: 2023-12-21 | Resolved: 2025-02-13

## 2025-02-13 PROBLEM — M53.86 DECREASED RANGE OF MOTION OF LUMBAR SPINE: Status: RESOLVED | Noted: 2024-10-03 | Resolved: 2025-02-13

## 2025-02-13 PROBLEM — M43.10 SPONDYLOLISTHESIS, ACQUIRED: Status: RESOLVED | Noted: 2024-01-04 | Resolved: 2025-02-13

## 2025-02-13 PROCEDURE — 97161 PT EVAL LOW COMPLEX 20 MIN: CPT | Performed by: PHYSICAL THERAPIST

## 2025-02-13 PROCEDURE — 97530 THERAPEUTIC ACTIVITIES: CPT | Performed by: PHYSICAL THERAPIST

## 2025-02-14 NOTE — PROGRESS NOTES
Physical Therapy Evaluation    Patient Name: Juan Luis Mcgrath  MRN: [id number]  YOB: 1955  Today's Date: 2/13/2025    Therapy Diagnosis:   Encounter Diagnosis   Name Primary?    Acute pain of left shoulder Yes     Physician: Primo Nassar, PT, DPT    Physician Orders: Eval and Treat  Medical Diagnosis:   Diagnosis   M19.012 (ICD-10-CM) - Arthritis of left acromioclavicular joint   M75.42 (ICD-10-CM) - Impingement syndrome of left shoulder   M25.512,G89.29 (ICD-10-CM) - Chronic left shoulder pain       Visit # / Visits Authorized:  1 / 1   Date of Evaluation:  2/13/2025   Insurance Authorization Period: 12/9/24 to 12/9/25  Plan of Care Certification:  2/13/2025 to 8/13/2025      Time In: 1400   Time Out: 1500  Total Time: 60   Total Billable Time: 60    Intake Outcome Measure for FOTO Survey    Therapist reviewed FOTO scores for Juan Luis Mcgrath on 2/13/2025.   FOTO report - see Media section or FOTO account episode details.     Intake Score: 66%         Subjective   History of Present Illness  Juan Luis is a 69 y.o. male who reports to physical therapy with a chief concern of Left Shoulder Pain. According to the patient's chart, Juan Luis has a past medical history of Degenerative joint disease (DJD) of lumbar spine, History of cold sores, History of skin cancer, Prostate cancer, Sacroiliitis, and Squamous cell carcinoma. Juan Luis has a past surgical history that includes Anterior cruciate ligament repair; Prostatectomy; Appendectomy; Removal of nail of digit (Right, 8/19/2019); Finger mass excision (Right, 9/9/2019); Finger amputation (Right, 9/23/2019); Injection of joint (Right, 7/22/2020); Injection of joint (Right, 4/7/2021); and Transforaminal epidural injection of steroid (Right, 10/4/2023).    The patient reports a medical diagnosis of Diagnosis  M19.012 (ICD-10-CM) - Arthritis of left acromioclavicular joint  M75.42 (ICD-10-CM) - Impingement syndrome of left shoulder  M25.512,G89.29 (ICD-10-CM) -  Chronic left shoulder pain.    Diagnostic tests related to this condition: MRI studies.   MRI Studies Details: Impression:     1. Supraspinatus tendinosis with a medium-sized, high-grade partial-thickness interstitial/concealed footprint tear with suspected bursal surface extension.  No retraction or volume loss.  2. Subscapularis tendinosis with a partial-thickness intrasubstance/concealed footprint tear.  3. Infraspinatus tendinosis.  4. Degenerative fraying/tearing of the superior labrum.  5. Mild glenohumeral osteoarthritis with a 1.4 x 1.4 cm full-thickness chondral defect of the humeral head.  6. AC joint arthrosis and prominent subacromial spurring.  7. Joint effusion with synovitis.  8. 1.2 x 0.7 cm loose body at the subscapularis recess, likely osteocartilaginous in nature.  9. Subacromial/subdeltoid bursitis.    Dominant Hand: Right  History of Present Condition/Illness: Patient is a 69 y.o. male presenting to the clinic with left shoulder pain. He underwent an injection to the biceps tendon sheath which provided him more relief than the intraarticular injection. He reports the pain began due to the amount of exercise he performs. He typically does at least 50 push-ups per day and up to 100 some days. He notes that  press also may have caused some of the injury. He works as a pediatric anaesthesiologist. He wants to continue to exercise to maintain his physique.      Activities of Daily Living  Social history was obtained from Patient.                   Pain     Patient reports a current pain level of 3/10. Pain at best is reported as 2/10. Pain at worst is reported as 6/10.   Location: Left Shoulder  Clinical Progression (since onset): Stable  Pain Qualities: Aching, Sharp  Pain-Relieving Factors: Rest  Pain-Aggravating Factors: Reaching, Lifting         Employment  Patient does not report that: Does the patient's condition impact their ability to work?  Employment Status: Employed full-time           Past Medical History/Physical Systems Review:   Juan Luis Mcgrath  has a past medical history of Degenerative joint disease (DJD) of lumbar spine, History of cold sores, History of skin cancer, Prostate cancer, Sacroiliitis, and Squamous cell carcinoma.    Juan Luis Mcgrath  has a past surgical history that includes Anterior cruciate ligament repair; Prostatectomy; Appendectomy; Removal of nail of digit (Right, 8/19/2019); Finger mass excision (Right, 9/9/2019); Finger amputation (Right, 9/23/2019); Injection of joint (Right, 7/22/2020); Injection of joint (Right, 4/7/2021); and Transforaminal epidural injection of steroid (Right, 10/4/2023).    Juan Luis has a current medication list which includes the following prescription(s): meloxicam and sildenafil, and the following Facility-Administered Medications: 0.9% nacl.    Review of patient's allergies indicates:  No Known Allergies     Objective   Posture          Left scapula is: Depressed, Protracted, and Downwardly Rotated              Shoulder Range of Motion  Right Shoulder   Active (deg) Passive (deg) Pain   Flexion 180       Extension         Scaption         ABduction 180       ADduction         Horizontal ABduction         Horizontal ADduction         External Rotation (Shoulder ABducted 0 degrees) 60       External Rotation (Shoulder ABducted 45 degrees)         External Rotation (Shoulder ABducted 90 degrees) 95       Internal Rotation (Shoulder ABducted 0 degrees)         Internal Rotation (Shoulder ABducted 45 degrees)   60     Internal Rotation (Shoulder ABducted 90 degrees)           Left Shoulder   Active (deg) Passive (deg) Pain   Flexion 170   Yes   Extension         Scaption         ABduction 170   Yes   ADduction         Horizontal ABduction         Horizontal ADduction         External Rotation (Shoulder ABducted 0 degrees) 40       External Rotation (Shoulder ABducted 45 degrees)         External Rotation (Shoulder ABducted 90 degrees)          Internal Rotation (Shoulder ABducted 0 degrees)         Internal Rotation (Shoulder ABducted 45 degrees)   50     Internal Rotation (Shoulder ABducted 90 degrees)                       Shoulder Strength - Planes of Motion   Right Strength Right Pain Left Strength Left  Pain   Flexion 5   4 Yes   Extension 5   5     ABduction 5   5     ADduction 5   5     Horizontal ABduction 5   5     Horizontal ADduction 5   5     Internal Rotation 0° 5   4-     Internal Rotation 90° 5   4- Yes   External Rotation 0° 5   4- Yes   External Rotation 90° 5   4- Yes       Shoulder Strength - Rotator Cuff Muscles   Right Strength Right Pain Left Strength Left  Pain   Supraspinatus 5   4     Infraspinatus 4+   4- Yes   Teres Minor 5   4     Subscapularis 4+   4-       Shoulder Strength - Scapular Stabilizing Muscles   Right Strength Right Pain Left Strength Left  Pain   Lower Trapezius 4   4-     Middle Trapezius 4   4     Rhomboids 5   5                 Shoulder Special Tests  Rotator Cuff Tests  Positive: Left Empty Can  Positive: Left Internal Rotation Resistance Strength  Impingement Tests  Positive: Left Mahoney-Sundar, Left Infraspinatus Muscle, and Left Painful Arc              Treatment:            Therapeutic Activity  Therapeutic Activity 1: Serratus slides YTB 2 x 10    Assessment & Plan   Assessment  Juan Luis presents with a condition of Low complexity.   Presentation of Symptoms: Stable       Functional Limitations: Range of motion  Impairments: Pain with functional activity, Impaired physical strength    Patient Goal for Therapy (PT): return to exercise without shoulder pain  Prognosis: Good  Assessment Details: Patient is a 69 y.o. male presenting with left shoulder pain. Patient presents with anterior glide of the humeral head with limited posterior capsule mobility, pain over the biceps tendon, weakness to the subscap and infraspinatus, and lacking upward rotation strength to the lower trap and serratus.      Plan  From a physical therapy perspective, the patient would benefit from: Skilled Rehab Services    Planned therapy interventions include: Therapeutic exercise, Therapeutic activities, Neuromuscular re-education, Manual therapy, and ADLs/IADLs.            Visit Frequency: 2 times Per Week for 12 Weeks.       This plan was discussed with Patient.   Discussion participants: Agreed Upon Plan of Care             Patient's spiritual, cultural, and educational needs considered and patient agreeable to plan of care and goals.     Education  Education was done with Patient. The patient's learning style includes Demonstration. The patient Demonstrates understanding.                 Goals:   Active       LTG       1.Report decreased shoulder pain  < / =  0 /10  to increase tolerance for return to lifting        Start:  02/13/25    Expected End:  04/10/25            2.Increase AROM to full motion without pain        Start:  02/13/25    Expected End:  04/10/25            3.Increase strength to >/= 4/5 in serratus to increase tolerance for ADL and work activities.        Start:  02/13/25    Expected End:  04/10/25            4. Pt goal: return to lifting without pain        Start:  02/13/25    Expected End:  04/10/25            5. Pt will have improved gcode of CJ (20-40% limited) on FOTO shoulder in order to demonstrate true functional improvement.         Start:  02/13/25               STG       1.Report decreased shoulder pain < / =  2/10  to increase tolerance for return to exercise        Start:  02/13/25    Expected End:  03/13/25            2. Increase PROM 0-90 deg ER pain free         Start:  02/13/25    Expected End:  03/13/25            3. Increased strength by 1/3 MMT grade in cuff to increase tolerance for ADL and work activities.        Start:  02/13/25    Expected End:  03/13/25            4. Pt to tolerate HEP to improve ROM and independence with ADL's        Start:  02/13/25    Expected End:  03/13/25                 Primo Nassar, PT, DPT

## 2025-02-20 ENCOUNTER — CLINICAL SUPPORT (OUTPATIENT)
Dept: REHABILITATION | Facility: HOSPITAL | Age: 70
End: 2025-02-20
Payer: COMMERCIAL

## 2025-02-20 DIAGNOSIS — M25.512 ACUTE PAIN OF LEFT SHOULDER: Primary | ICD-10-CM

## 2025-02-20 PROCEDURE — 97140 MANUAL THERAPY 1/> REGIONS: CPT | Performed by: PHYSICAL THERAPIST

## 2025-02-20 PROCEDURE — 97112 NEUROMUSCULAR REEDUCATION: CPT | Performed by: PHYSICAL THERAPIST

## 2025-02-20 NOTE — PROGRESS NOTES
"Physical Therapy Visit    Patient Name: Juan Luis Mcgrath  MRN: [id number]  YOB: 1955  Today's Date: 2/20/2025    Therapy Diagnosis:   Encounter Diagnosis   Name Primary?    Acute pain of left shoulder Yes     Physician: Primo Nassar, PT, DPT    Physician Orders: Eval and Treat  Medical Diagnosis:   Diagnosis   M19.012 (ICD-10-CM) - Arthritis of left acromioclavicular joint   M75.42 (ICD-10-CM) - Impingement syndrome of left shoulder   M25.512,G89.29 (ICD-10-CM) - Chronic left shoulder pain         Visit # / Visits Authorized:  1 / 10   Date of Evaluation:  2/13/2025   Insurance Authorization Period: 12/9/24 to 12/9/25  Plan of Care Certification:  2/13/2025 to 8/13/2025      Time In: 1102   Time Out: 1205  Total Time: 63   Total Billable Time: 63           Subjective   Notes that he has not been consistent with his exercises, busy with AI learning in case he has to have shoulder surgery.  Pain reported as 5/10.      Objective            Treatment:  Manual Therapy  Manual Therapy Activity 1: Gr I-II oscillations pain relief and relaxation  Manual Therapy Activity 2: Gr III flexion pain free range  Manual Therapy Activity 3: Gr II-III inferior mob  Manual Therapy Activity 4: ER deg arm by side and propped up on towel  Manual Therapy Activity 5: Centering glide AP Gr III    Balance/Neuromuscular Re-Education  Balance/Neuromuscular Re-Education Activity 1: Supine controlled arc of motion 2# 2 x 10  Balance/Neuromuscular Re-Education Activity 2: Rhythmic stab GTB IR/ER 3x fatigue  Balance/Neuromuscular Re-Education Activity 3: Side lying ER 1# 3 x 12  Balance/Neuromuscular Re-Education Activity 4: Prone row bent elbow T 2 x 15  Balance/Neuromuscular Re-Education Activity 5: Landmine press 15# 2 x 15  Balance/Neuromuscular Re-Education Activity 6: Wall rotations YTB 2 x 10  Balance/Neuromuscular Re-Education Activity 7: IR/ER Walkouts OTB 2 x 10 3" hold  Balance/Neuromuscular Re-Education Activity 8: " "Row with ER OTB 2 X 10 3"    Assessment & Plan   Assessment: Lacked compliance with his first week of exercising. Progressed arc of motion, noted how difficult it was to control the weight. Pain in side lying ER with weight.  Evaluation/Treatment Tolerance: Patient tolerated treatment well    Patient will continue to benefit from skilled outpatient physical therapy to address the deficits listed in the problem list box on initial evaluation, provide pt/family education and to maximize pt's level of independence in the home and community environment.     Patient's spiritual, cultural, and educational needs considered and patient agreeable to plan of care and goals.           Plan: progress cuff strength    Goals:   Active       LTG       1.Report decreased shoulder pain  < / =  0 /10  to increase tolerance for return to lifting  (Progressing)       Start:  02/13/25    Expected End:  04/10/25            2.Increase AROM to full motion without pain  (Progressing)       Start:  02/13/25    Expected End:  04/10/25            3.Increase strength to >/= 4/5 in serratus to increase tolerance for ADL and work activities.  (Progressing)       Start:  02/13/25    Expected End:  04/10/25            4. Pt goal: return to lifting without pain  (Progressing)       Start:  02/13/25    Expected End:  04/10/25            5. Pt will have improved gcode of CJ (20-40% limited) on FOTO shoulder in order to demonstrate true functional improvement.   (Progressing)       Start:  02/13/25               STG       1.Report decreased shoulder pain < / =  2/10  to increase tolerance for return to exercise  (Progressing)       Start:  02/13/25    Expected End:  03/13/25            2. Increase PROM 0-90 deg ER pain free   (Progressing)       Start:  02/13/25    Expected End:  03/13/25            3. Increased strength by 1/3 MMT grade in cuff to increase tolerance for ADL and work activities.  (Progressing)       Start:  02/13/25    Expected End:  " 03/13/25            4. Pt to tolerate HEP to improve ROM and independence with ADL's  (Progressing)       Start:  02/13/25    Expected End:  03/13/25                Primo Nassar, PT, DPT

## 2025-02-22 ENCOUNTER — TELEPHONE (OUTPATIENT)
Dept: SPORTS MEDICINE | Facility: CLINIC | Age: 70
End: 2025-02-22
Payer: COMMERCIAL

## 2025-02-22 RX ORDER — CELECOXIB 200 MG/1
200 CAPSULE ORAL DAILY
Qty: 40 CAPSULE | Refills: 1 | Status: SHIPPED | OUTPATIENT
Start: 2025-02-22 | End: 2025-05-13

## 2025-02-22 NOTE — TELEPHONE ENCOUNTER
Prescription given for Celebrex.  He will take that in lieu of the Naprosyn.  Continued conservative care for now.  He continues to have pain and it looks like we are looking towards an arthroscopic procedure for the shoulder.

## 2025-02-27 ENCOUNTER — CLINICAL SUPPORT (OUTPATIENT)
Dept: REHABILITATION | Facility: HOSPITAL | Age: 70
End: 2025-02-27
Payer: COMMERCIAL

## 2025-02-27 DIAGNOSIS — M25.512 ACUTE PAIN OF LEFT SHOULDER: Primary | ICD-10-CM

## 2025-02-27 PROCEDURE — 97112 NEUROMUSCULAR REEDUCATION: CPT | Performed by: PHYSICAL THERAPIST

## 2025-02-27 NOTE — PROGRESS NOTES
"Physical Therapy Visit    Patient Name: Juan Luis Mcgrath  MRN: [id number]  YOB: 1955  Today's Date: 2/27/2025    Therapy Diagnosis:   Encounter Diagnosis   Name Primary?    Acute pain of left shoulder Yes       Physician: Primo Nassar, PT, DPT    Physician Orders: Eval and Treat  Medical Diagnosis:   Diagnosis   M19.012 (ICD-10-CM) - Arthritis of left acromioclavicular joint   M75.42 (ICD-10-CM) - Impingement syndrome of left shoulder   M25.512,G89.29 (ICD-10-CM) - Chronic left shoulder pain         Visit # / Visits Authorized:  2 / 10   Date of Evaluation:  2/13/2025   Insurance Authorization Period: 12/9/24 to 12/9/25  Plan of Care Certification:  2/13/2025 to 8/13/2025      Time In: 0900   Time Out: 1000  Total Time: 60   Total Billable Time: 60           Subjective   Notes increased pain with use of the shoulder. He has been trying to do some exercises while riding the recumbent bike..  Pain reported as 5/10.      Objective            Treatment:       Balance/Neuromuscular Re-Education  Balance/Neuromuscular Re-Education Activity 1: IR arm in front BTB 3 x 15  Balance/Neuromuscular Re-Education Activity 2: Quadruped T 2 x 12  Balance/Neuromuscular Re-Education Activity 3: Quadruped serratus HHR 2 x 10  Balance/Neuromuscular Re-Education Activity 4: Row cable column 13# 3 x 12  Balance/Neuromuscular Re-Education Activity 5: ER banded rhythmic stab YTB 30" 5x    Assessment & Plan   Assessment: Educated on threee exercises to perform. Notes he felt good with exercise, more of a muscular work but not pain. Thinks he is pushing it with weight and exercises too much in the clinic. Will get some easier bands to begin using.  Evaluation/Treatment Tolerance: Patient tolerated treatment well    Patient will continue to benefit from skilled outpatient physical therapy to address the deficits listed in the problem list box on initial evaluation, provide pt/family education and to maximize pt's level of " independence in the home and community environment.     Patient's spiritual, cultural, and educational needs considered and patient agreeable to plan of care and goals.           Plan: progress cuff strength    Goals:   Active       LTG       1.Report decreased shoulder pain  < / =  0 /10  to increase tolerance for return to lifting  (Progressing)       Start:  02/13/25    Expected End:  04/10/25            2.Increase AROM to full motion without pain  (Progressing)       Start:  02/13/25    Expected End:  04/10/25            3.Increase strength to >/= 4/5 in serratus to increase tolerance for ADL and work activities.  (Progressing)       Start:  02/13/25    Expected End:  04/10/25            4. Pt goal: return to lifting without pain  (Progressing)       Start:  02/13/25    Expected End:  04/10/25            5. Pt will have improved gcode of CJ (20-40% limited) on FOTO shoulder in order to demonstrate true functional improvement.   (Progressing)       Start:  02/13/25               STG       1.Report decreased shoulder pain < / =  2/10  to increase tolerance for return to exercise  (Progressing)       Start:  02/13/25    Expected End:  03/13/25            2. Increase PROM 0-90 deg ER pain free   (Progressing)       Start:  02/13/25    Expected End:  03/13/25            3. Increased strength by 1/3 MMT grade in cuff to increase tolerance for ADL and work activities.  (Progressing)       Start:  02/13/25    Expected End:  03/13/25            4. Pt to tolerate HEP to improve ROM and independence with ADL's  (Progressing)       Start:  02/13/25    Expected End:  03/13/25                Primo Nassar, PT, DPT

## 2025-03-13 ENCOUNTER — CLINICAL SUPPORT (OUTPATIENT)
Dept: REHABILITATION | Facility: HOSPITAL | Age: 70
End: 2025-03-13
Payer: COMMERCIAL

## 2025-03-13 DIAGNOSIS — M25.512 ACUTE PAIN OF LEFT SHOULDER: Primary | ICD-10-CM

## 2025-03-13 PROCEDURE — 97140 MANUAL THERAPY 1/> REGIONS: CPT | Performed by: PHYSICAL THERAPIST

## 2025-03-13 PROCEDURE — 97112 NEUROMUSCULAR REEDUCATION: CPT | Performed by: PHYSICAL THERAPIST

## 2025-03-13 PROCEDURE — 97530 THERAPEUTIC ACTIVITIES: CPT | Performed by: PHYSICAL THERAPIST

## 2025-03-13 NOTE — PROGRESS NOTES
Physical Therapy Visit    Patient Name: Juan Luis Mcgrath  MRN: [id number]  YOB: 1955  Today's Date: 3/13/2025    Therapy Diagnosis:   Encounter Diagnosis   Name Primary?    Acute pain of left shoulder Yes       Physician: Primo Nassar, PT, DPT    Physician Orders: Eval and Treat  Medical Diagnosis:   Diagnosis   M19.012 (ICD-10-CM) - Arthritis of left acromioclavicular joint   M75.42 (ICD-10-CM) - Impingement syndrome of left shoulder   M25.512,G89.29 (ICD-10-CM) - Chronic left shoulder pain         Visit # / Visits Authorized:  3 / 10   Date of Evaluation:  2/13/2025   Insurance Authorization Period: 12/9/24 to 12/9/25  Plan of Care Certification:  2/13/2025 to 8/13/2025      Time In: 1009   Time Out: 1102  Total Time: 53   Total Billable Time: 53           Subjective   Juan Luis reports that he has been doing better with performing his home exercises, but still is having pain..         Objective            Treatment:  Manual Therapy  Manual Therapy Activity 1: Crossbody MET for posterior cuff mobility  Manual Therapy Activity 2: Gr III flexion pain free range  Manual Therapy Activity 3: GrII-III inferior mob  Manual Therapy Activity 5: Centering glide AP Gr III    Balance/Neuromuscular Re-Education  Balance/Neuromuscular Re-Education Activity 1: Supine IR @ 90 YTB 3x15 (PT provided posterior glide of humeral head)  Balance/Neuromuscular Re-Education Activity 2: Quadruped serratus HHR 3x10    Assessment & Plan   Assessment: Juan Luis presented to therapy with no significant changes in symptoms. His posterior capsule hypomobility decreased after crossbody MET. His anterior shoulder pain decreased with manual posterior glide during subscapularis training.  Evaluation/Treatment Tolerance: Patient tolerated treatment well    Patient will continue to benefit from skilled outpatient physical therapy to address the deficits listed in the problem list box on initial evaluation, provide pt/family education and  to maximize pt's level of independence in the home and community environment.     Patient's spiritual, cultural, and educational needs considered and patient agreeable to plan of care and goals.           Plan: progress cuff strength    Goals:   Active       LTG       1.Report decreased shoulder pain  < / =  0 /10  to increase tolerance for return to lifting  (Progressing)       Start:  02/13/25    Expected End:  04/10/25            2.Increase AROM to full motion without pain  (Progressing)       Start:  02/13/25    Expected End:  04/10/25            3.Increase strength to >/= 4/5 in serratus to increase tolerance for ADL and work activities.  (Progressing)       Start:  02/13/25    Expected End:  04/10/25            4. Pt goal: return to lifting without pain  (Progressing)       Start:  02/13/25    Expected End:  04/10/25            5. Pt will have improved gcode of CJ (20-40% limited) on FOTO shoulder in order to demonstrate true functional improvement.   (Progressing)       Start:  02/13/25               STG       1.Report decreased shoulder pain < / =  2/10  to increase tolerance for return to exercise  (Progressing)       Start:  02/13/25    Expected End:  03/13/25            2. Increase PROM 0-90 deg ER pain free   (Progressing)       Start:  02/13/25    Expected End:  03/13/25            3. Increased strength by 1/3 MMT grade in cuff to increase tolerance for ADL and work activities.  (Progressing)       Start:  02/13/25    Expected End:  03/13/25            4. Pt to tolerate HEP to improve ROM and independence with ADL's  (Progressing)       Start:  02/13/25    Expected End:  03/13/25                Rafy Rey, PT  Co-treated with Primo Nassar, PT, DPT, OCS

## 2025-03-20 ENCOUNTER — CLINICAL SUPPORT (OUTPATIENT)
Dept: REHABILITATION | Facility: HOSPITAL | Age: 70
End: 2025-03-20
Payer: COMMERCIAL

## 2025-03-20 DIAGNOSIS — M25.512 ACUTE PAIN OF LEFT SHOULDER: Primary | ICD-10-CM

## 2025-03-20 PROCEDURE — 97112 NEUROMUSCULAR REEDUCATION: CPT | Performed by: PHYSICAL THERAPIST

## 2025-03-20 PROCEDURE — 97530 THERAPEUTIC ACTIVITIES: CPT | Performed by: PHYSICAL THERAPIST

## 2025-03-20 PROCEDURE — 97140 MANUAL THERAPY 1/> REGIONS: CPT | Performed by: PHYSICAL THERAPIST

## 2025-03-21 NOTE — PROGRESS NOTES
Outpatient Rehab    Physical Therapy Progress Note    Patient Name: Juan Luis Mcgrath  MRN: [id number]  YOB: 1955  Encounter Date: 3/20/2025    Therapy Diagnosis:   Encounter Diagnosis   Name Primary?    Acute pain of left shoulder Yes     Physician: Primo Nassar, PT, DPT    Physician Orders: Eval and Treat  Medical Diagnosis: Shoulder pain    Visit # / Visits Authorized:  4 / 10  Date of Evaluation: 2/13/2025  Insurance Authorization Period: 2/13/2025 to 12/31/2025  Plan of Care Certification:  2/13/2025 to 8/13/2025      PT/PTA: PT   Number of PTA visits since last PT visit:0  Time In: 1600   Time Out: 1700  Total Time: 60   Total Billable Time: 60    FOTO:  Intake Score: 66%  Survey Score 1:  %  Survey Score 2:  %         Subjective   He slept better last night after not loading the shoulder. Has been more consistent with his HEP, but thinks he will need surgery in 3 months. Unavoidable.  Pain reported as 5/10.      Objective           Treatment:  Manual Therapy  MT 1: Crossbody MET for posterior cuff mobility  MT 2: Gr III flexion pain free range  MT 3: GrII-III inferior mob  MT 5: Centering glide AP Gr III  Balance/Neuromuscular Re-Education  NMR 1: Supine IR @ 90 2# 3x15  NMR 2: Quadruped clock YTB side to side 2 x 10  NMR 3: Side lying ER 1# 3 X 15  Therapeutic Activity  TA 1: Foam roll up wall 30x  TA 2: IR isometric OH press GTB 3 x 8    Time Entry(in minutes):  Manual Therapy Time Entry: 15  Neuromuscular Re-Education Time Entry: 30  Therapeutic Activity Time Entry: 15    Assessment & Plan   Assessment: Progressed isometric cuff strengthening throuhg ROM. Does very well in CKC and with serratus loading.  Evaluation/Treatment Tolerance: Patient tolerated treatment well    Patient will continue to benefit from skilled outpatient physical therapy to address the deficits listed in the problem list box on initial evaluation, provide pt/family education and to maximize pt's level of  independence in the home and community environment.     Patient's spiritual, cultural, and educational needs considered and patient agreeable to plan of care and goals.           Plan: progress cuff strength    Goals:   Active       LTG       1.Report decreased shoulder pain  < / =  0 /10  to increase tolerance for return to lifting  (Progressing)       Start:  02/13/25    Expected End:  04/10/25            2.Increase AROM to full motion without pain  (Progressing)       Start:  02/13/25    Expected End:  04/10/25            3.Increase strength to >/= 4/5 in serratus to increase tolerance for ADL and work activities.  (Progressing)       Start:  02/13/25    Expected End:  04/10/25            4. Pt goal: return to lifting without pain  (Progressing)       Start:  02/13/25    Expected End:  04/10/25            5. Pt will have improved gcode of CJ (20-40% limited) on FOTO shoulder in order to demonstrate true functional improvement.   (Progressing)       Start:  02/13/25               STG       1.Report decreased shoulder pain < / =  2/10  to increase tolerance for return to exercise  (Progressing)       Start:  02/13/25    Expected End:  03/13/25            2. Increase PROM 0-90 deg ER pain free   (Progressing)       Start:  02/13/25    Expected End:  03/13/25            3. Increased strength by 1/3 MMT grade in cuff to increase tolerance for ADL and work activities.  (Progressing)       Start:  02/13/25    Expected End:  03/13/25            4. Pt to tolerate HEP to improve ROM and independence with ADL's  (Met)       Start:  02/13/25    Expected End:  03/13/25    Resolved:  03/20/25             Primo Nassar, PT, DPT

## 2025-03-25 ENCOUNTER — TELEPHONE (OUTPATIENT)
Dept: SPORTS MEDICINE | Facility: CLINIC | Age: 70
End: 2025-03-25
Payer: COMMERCIAL

## 2025-03-25 NOTE — TELEPHONE ENCOUNTER
I received a text message from the patient yesterday.  He has significant ongoing left shoulder pain.  Same as before.  Previous MRI demonstrated SLAP pathology with biceps tendinopathy, partial-thickness rotator cuff tearing, shoulder chondromalacia and AC arthritis.  Prior conservative treatment has included AC joint steroid injection, glenohumeral steroid injection, formal physical therapy, oral medication.  Unfortunately pain is persistent and recurrent.  Treatment options have been discussed at length and he does wish to proceed with surgery at this time.  I think that is reasonable given the extent and duration of his complaints despite prior conservative treatment.  We will select a date that is approximately 3 months post last injection.    Plan is Left shoulder arthroscopic rotator cuff repair versus debridement as indicated, possible xenograft/allograft patch augmentation, extensive debridement, biceps tenodesis, subacromial decompression, distal clavicle excision    Preop clearance per PCP/anesthesia team    Informed Consent:    The details of the surgical procedure were explained, including the location of probable incisions and a description of possible hardware and/or grafts to be used. Alternatives to both operative and non-operative options with associated risks and benefits were discussed. The patient understands the likely convalescence after surgery and, in particular, the expected postop rehab and recovery course. The outlined risks and potential complications of the proposed procedure include but are not limited to: infection, poor wound healing, scarring, deformity, stiffness, swelling, continued or recurrent pain, instability, hardware or prosthetic failure if implanted, symptomatic hardware requiring removal, dislocation, weakness, neurovascular injury, numbness, chronic regional pain disorder, tissue nonhealing/irreparability/retear, subsequent contralateral limb injury or pathology,  chondral injury, arthritis, fracture, blood clot formation, inability to return to previous level of activity, anesthetic or regional block complication up to death, need for additional procedure as indicated intraoperatively, and potential need for further surgery.    The patient was also informed and understands that the risks of surgery are greater for patients with a current condition or history of heart disease, obesity, clotting disorders, recurrent infections, steroid use, current or past smoking, and factors such as sedentary lifestyle and noncompliance with medications, therapy or follow-up. The degree of the increased risk is hard to estimate with any degree of precision. If applicable, smoking cessation was discussed.     All questions were answered. The patient has verbalized understanding of these issues and wishes to proceed with the surgery as discussed.

## 2025-03-25 NOTE — TELEPHONE ENCOUNTER
Spoke c pt. Discussed left shoulder surgery scheduling. He will think about dates offered in April & May, he will reach out when ready to schedule. Pt will call c additional questions/concerns in interim. Pt expressed understanding & was thankful.

## 2025-03-28 DIAGNOSIS — Z00.00 ROUTINE GENERAL MEDICAL EXAMINATION AT A HEALTH CARE FACILITY: Primary | ICD-10-CM

## 2025-03-28 DIAGNOSIS — Z85.46 HISTORY OF PROSTATE CANCER: ICD-10-CM

## 2025-03-31 DIAGNOSIS — M75.112 NONTRAUMATIC INCOMPLETE TEAR OF LEFT ROTATOR CUFF: Primary | ICD-10-CM

## 2025-03-31 DIAGNOSIS — M19.012 ARTHRITIS OF LEFT ACROMIOCLAVICULAR JOINT: ICD-10-CM

## 2025-03-31 DIAGNOSIS — M75.22 BICEPS TENDINITIS OF LEFT SHOULDER: ICD-10-CM

## 2025-04-07 ENCOUNTER — OFFICE VISIT (OUTPATIENT)
Dept: DERMATOLOGY | Facility: CLINIC | Age: 70
End: 2025-04-07
Payer: COMMERCIAL

## 2025-04-07 DIAGNOSIS — D22.9 MULTIPLE BENIGN NEVI: ICD-10-CM

## 2025-04-07 DIAGNOSIS — L82.1 SEBORRHEIC KERATOSES: ICD-10-CM

## 2025-04-07 DIAGNOSIS — L73.8 SEBACEOUS HYPERPLASIA: ICD-10-CM

## 2025-04-07 DIAGNOSIS — L82.0 SEBORRHEIC KERATOSES, INFLAMED: Primary | ICD-10-CM

## 2025-04-07 DIAGNOSIS — L57.0 ACTINIC KERATOSIS: ICD-10-CM

## 2025-04-07 DIAGNOSIS — Z12.83 SCREENING EXAM FOR SKIN CANCER: ICD-10-CM

## 2025-04-07 DIAGNOSIS — Z00.00 ROUTINE GENERAL MEDICAL EXAMINATION AT A HEALTH CARE FACILITY: Primary | ICD-10-CM

## 2025-04-07 DIAGNOSIS — D18.01 CHERRY ANGIOMA: ICD-10-CM

## 2025-04-07 DIAGNOSIS — Z85.828 HISTORY OF NONMELANOMA SKIN CANCER: ICD-10-CM

## 2025-04-07 DIAGNOSIS — L81.4 LENTIGO: ICD-10-CM

## 2025-04-07 PROCEDURE — 17003 DESTRUCT PREMALG LES 2-14: CPT | Mod: XS,S$GLB,, | Performed by: STUDENT IN AN ORGANIZED HEALTH CARE EDUCATION/TRAINING PROGRAM

## 2025-04-07 PROCEDURE — 99213 OFFICE O/P EST LOW 20 MIN: CPT | Mod: 25,S$GLB,, | Performed by: STUDENT IN AN ORGANIZED HEALTH CARE EDUCATION/TRAINING PROGRAM

## 2025-04-07 PROCEDURE — 1101F PT FALLS ASSESS-DOCD LE1/YR: CPT | Mod: CPTII,S$GLB,, | Performed by: STUDENT IN AN ORGANIZED HEALTH CARE EDUCATION/TRAINING PROGRAM

## 2025-04-07 PROCEDURE — 99999 PR PBB SHADOW E&M-EST. PATIENT-LVL II: CPT | Mod: PBBFAC,,, | Performed by: STUDENT IN AN ORGANIZED HEALTH CARE EDUCATION/TRAINING PROGRAM

## 2025-04-07 PROCEDURE — 17110 DESTRUCTION B9 LES UP TO 14: CPT | Mod: S$GLB,,, | Performed by: STUDENT IN AN ORGANIZED HEALTH CARE EDUCATION/TRAINING PROGRAM

## 2025-04-07 PROCEDURE — 1160F RVW MEDS BY RX/DR IN RCRD: CPT | Mod: CPTII,S$GLB,, | Performed by: STUDENT IN AN ORGANIZED HEALTH CARE EDUCATION/TRAINING PROGRAM

## 2025-04-07 PROCEDURE — 1159F MED LIST DOCD IN RCRD: CPT | Mod: CPTII,S$GLB,, | Performed by: STUDENT IN AN ORGANIZED HEALTH CARE EDUCATION/TRAINING PROGRAM

## 2025-04-07 PROCEDURE — 17000 DESTRUCT PREMALG LESION: CPT | Mod: XS,S$GLB,, | Performed by: STUDENT IN AN ORGANIZED HEALTH CARE EDUCATION/TRAINING PROGRAM

## 2025-04-07 PROCEDURE — 1126F AMNT PAIN NOTED NONE PRSNT: CPT | Mod: CPTII,S$GLB,, | Performed by: STUDENT IN AN ORGANIZED HEALTH CARE EDUCATION/TRAINING PROGRAM

## 2025-04-07 PROCEDURE — 3288F FALL RISK ASSESSMENT DOCD: CPT | Mod: CPTII,S$GLB,, | Performed by: STUDENT IN AN ORGANIZED HEALTH CARE EDUCATION/TRAINING PROGRAM

## 2025-04-07 NOTE — PROGRESS NOTES
Subjective:      Patient ID:  Juan Luis Mcgrath is a 69 y.o. male who presents for   Chief Complaint   Patient presents with    Skin Check     TBSE      /Juan Luis Mcgrath is a 69 y.o. male who presents for: FBSE screening exam for skin cancer.    Last office visit 08/19/2024    The patient has the following lesions of concern:  Location: Scalp  Duration: 2 mos  Symptoms: tenderness, pain  Relieving factors/Previous treatments: none    Pt has h/o SCC, had tip of finger amputated         Problem Noted   History of Skin Cancer 8/19/2024    - 10/2022: Mid chest, SCCis s/p excision  -2019: SCC, right 5th finger s/p excision          Review of Systems   Skin:  Positive for activity-related sunscreen use and wears hat. Negative for daily sunscreen use and recent sunburn.   Hematologic/Lymphatic: Does not bruise/bleed easily.       Objective:   Physical Exam   Constitutional: He appears well-developed and well-nourished. No distress.   Neurological: He is alert and oriented to person, place, and time. He is not disoriented.   Psychiatric: He has a normal mood and affect.   Skin:   Areas Examined (abnormalities noted in diagram):   Scalp / Hair Palpated and Inspected  Head / Face Inspection Performed  Neck Inspection Performed  Chest / Axilla Inspection Performed  Abdomen Inspection Performed  Genitals / Buttocks / Groin Inspection Performed  Back Inspection Performed  RUE Inspected  LUE Inspection Performed  RLE Inspected  LLE Inspection Performed  Nails and Digits Inspection Performed                     Diagram Legend     Erythematous scaling macule/papule c/w actinic keratosis       Vascular papule c/w angioma      Pigmented verrucoid papule/plaque c/w seborrheic keratosis      Yellow umbilicated papule c/w sebaceous hyperplasia      Irregularly shaped tan macule c/w lentigo     1-2 mm smooth white papules consistent with Milia      Movable subcutaneous cyst with punctum c/w epidermal inclusion cyst       Subcutaneous movable cyst c/w pilar cyst      Firm pink to brown papule c/w dermatofibroma      Pedunculated fleshy papule(s) c/w skin tag(s)      Evenly pigmented macule c/w junctional nevus     Mildly variegated pigmented, slightly irregular-bordered macule c/w mildly atypical nevus      Flesh colored to evenly pigmented papule c/w intradermal nevus       Pink pearly papule/plaque c/w basal cell carcinoma      Erythematous hyperkeratotic cursted plaque c/w SCC      Surgical scar with no sign of skin cancer recurrence      Open and closed comedones      Inflammatory papules and pustules      Verrucoid papule consistent consistent with wart     Erythematous eczematous patches and plaques     Dystrophic onycholytic nail with subungual debris c/w onychomycosis     Umbilicated papule    Erythematous-base heme-crusted tan verrucoid plaque consistent with inflamed seborrheic keratosis     Erythematous Silvery Scaling Plaque c/w Psoriasis     See annotation      Assessment / Plan:        Seborrheic keratoses, inflamed  - lesion was itchy    Cryosurgery procedure note:    Verbal consent from the patient is obtained including, but not limited to, risk of hypopigmentation/hyperpigmentation, scar, recurrence of lesion. Liquid nitrogen cryosurgery is applied to 1 lesions to produce a freeze injury. The patient is aware that blisters may form and is instructed on wound care with gentle cleansing and use of vaseline ointment to keep moist until healed. The patient is supplied a handout on cryosurgery and is instructed to call if lesions do not completely resolve.    Actinic keratosis  Cryosurgery Procedure Note    Verbal consent from the patient is obtained including, but not limited to, risk of hypopigmentation/hyperpigmentation, scar, recurrence of lesion. The patient is aware of the precancerous quality and need for treatment of these lesions. Liquid nitrogen cryosurgery is applied to the 10 actinic keratoses, as detailed in  the physical exam, to produce a freeze injury. The patient is aware that blisters may form and is instructed on wound care with gentle cleansing and use of vaseline ointment to keep moist until healed. The patient is supplied a handout on cryosurgery and is instructed to call if lesions do not completely resolve.    Multiple benign nevi  Cherry angioma  Seborrheic keratoses  Sebaceous hyperplasia  Reassurance given to patient. No treatment is necessary.   Treatment of benign, asymptomatic lesions may be considered cosmetic.    Lentigo  hydroquinone 8% / tretinoin 0.025% / kojic acid 1% / niacinamide 4% / fluocinolone 0.025% cream. Apply a thin layer to dark spots on cheeks qhs. Do not use for longer than 12 weeks in a row then take a 12 week break    History of nonmelanoma skin cancer  Screening exam for skin cancer  Area of previous NMSC examined. Site well healed with no signs of recurrence.    Total body skin examination performed today including at least 12 points as noted in physical examination. No lesions suspicious for malignancy noted.    Recommend daily sun protection/avoidance, use of at least SPF 30, broad spectrum sunscreen (OTC drug), skin self examinations, and routine physician surveillance to optimize early detection             Follow up in about 1 year (around 4/7/2026) for TBSE.

## 2025-04-07 NOTE — PATIENT INSTRUCTIONS
CRYOSURGERY      Your doctor has used a method called cryosurgery to treat your skin condition. Cryosurgery refers to the use of very cold substances to treat a variety of skin conditions such as warts, pre-skin cancers, molluscum contagiosum, sun spots, and several benign growths. The substance we use in cryosurgery is liquid nitrogen and is so cold (-195 degrees Celsius) that is burns when administered.     Following treatment in the office, the skin may immediately burn and become red. You may find the area around the lesion is affected as well. It is sometimes necessary to treat not only the lesion, but a small area of the surrounding normal skin to achieve a good response.     A blister, and even a blood filled blister, may form after treatment.   This is a normal response. If the blister is painful, it is acceptable to sterilize a needle and with rubbing alcohol and gently pop the blister. It is important that you gently wash the area with soap and warm water as the blister fluid may contain wart virus if a wart was treated. Do no remove the roof of the blister.     The area treated can take anywhere from 1-3 weeks to heal. Healing time depends on the kind skin lesion treated, the location, and how aggressively the lesion was treated. It is recommended that the areas treated are covered with Vaseline or bacitracin ointment and a band-aid. If a band-aid is not practical, just ointment applied several times per day will do. Keeping these areas moist will speed the healing time.    Treatment with liquid nitrogen can leave a scar. In dark skin, it may be a light or dark scar, in light skin it may be a white or pink scar. These will generally fade with time, but may never go away completely.     If you have any concerns after your treatment, please feel free to call the office.       [address] [facility] [place] [address]/ [phone] FAX/ [email or web address]       Sunscreen Guidelines  Sunscreen  protects your skin by absorbing and reflecting ultraviolet rays. All sunscreens have a sun protection factor (SPF) rating that indicates how long a sunscreen will remain effective on the skin.    Why protect your skin?  The sun's rays are composed of many different wavelengths, including UVA, UVB, and visible light that each affect the skin differently.    UVB: sunburn, photoaging, skin cancer (melanoma, basal cell, and squamous cell carcinomas) and modulation of the skin's immune system.    UVA: similar to above but thought to contribute more to aging; at the same dose of UVB it is less powerful however the sun has 10-20 times the levels of UVA as compared with UVB.  Visible light: implicated in causing unwanted darkening of skin, such as melasma and post-inflammatory hyperpigmentation in darker skin types     If I have dark skin, do I need to worry about the sun?    More darkly pigmented skin is more protected against UV-induced skin cancer, sunburn, and photoaging, though may still suffer from sun-related conditions, including melasma, hyperpigmentation, and other dark spots.    Sun avoidance  As a general rule, stay out of the sun as much as possible between 10 a.m. - 4 p.m.    Download the EPA UV index joseph to track the UV index by hour in your zip code.      Which sunscreen should I choose?  The best sunscreen to use varies by individual. The one that feels best on your skin and fits your lifestyle will be the one you will likely use most regularly.   Active ingredients of sunscreen vary by , and may be a chemical (such as avobenzone or oxybenzone) or physical agent (such as zinc oxide or titanium dioxide). I recommend a physical agent.  A water-resistant sunscreen is one that maintains the SPF level after 40 minutes of water exposure. A very water-resistant sunscreen maintains the SPF level after 80 minutes of water exposure.    Sunscreen: this is the last layer in sun protection   Be generous: 1  "shot glass of sunscreen for your body, ½ teaspoon for your face/neck  Reapply every 2 hours  Broad spectrum (provides UVA/UVB protection), SPF 50 or above  Avoid spray sunscreens: less effective and have been found to contain benzene (carcinogen)    Sun protective clothing  Although sunscreen helps minimize sun damage, no sunscreen completely blocks all wavelengths of UV light. Wearing sun protective clothing such as hats, rashguards or swim shirts, and long sleeves and/or pants, as well as avoiding sun exposure from 10 a.m. to 4 p.m. will help protect your skin from overexposure and minimize sun damage. Seek shade.  Long sleeved clothing, hats, and sunglasses: makes sun protection easier, more effective, and can even be more affordable, since sunscreen needs to be reapplied frequently.    [employer] ([email or web address])  [employer] (www.[email or web address])  [employer] (www.[email or web address])  [employer] (www.[email or web address])  Hats from Eugenia Henrry ([email or web address])    My Favorite Sunscreens:  Physical blockers: Can have a "white case" but in general are more effective  - Face: CeraVe tinted mineral sunscreen, Bare Minerals complexion rescue (20 shades), Elta MD (UV elements, UV physical, UV restore, etc), Tizo ultra zinc tinted, Cetaphil Sheer Mineral Face Liquid Sunscreen  - Body: Blue Lizard, Neutrogena Sheer Zinc, Eucerin Daily Protection, Aveeno Baby   "

## 2025-04-09 ENCOUNTER — LAB VISIT (OUTPATIENT)
Dept: LAB | Facility: HOSPITAL | Age: 70
End: 2025-04-09
Payer: COMMERCIAL

## 2025-04-09 ENCOUNTER — RESULTS FOLLOW-UP (OUTPATIENT)
Dept: INTERNAL MEDICINE | Facility: CLINIC | Age: 70
End: 2025-04-09

## 2025-04-09 DIAGNOSIS — Z00.00 ROUTINE GENERAL MEDICAL EXAMINATION AT A HEALTH CARE FACILITY: ICD-10-CM

## 2025-04-09 DIAGNOSIS — Z85.46 HISTORY OF PROSTATE CANCER: ICD-10-CM

## 2025-04-09 LAB
ABSOLUTE EOSINOPHIL (OHS): 0.2 K/UL
ABSOLUTE MONOCYTE (OHS): 0.6 K/UL (ref 0.3–1)
ABSOLUTE NEUTROPHIL COUNT (OHS): 3.32 K/UL (ref 1.8–7.7)
ALBUMIN SERPL BCP-MCNC: 3.8 G/DL (ref 3.5–5.2)
ALP SERPL-CCNC: 70 UNIT/L (ref 40–150)
ALT SERPL W/O P-5'-P-CCNC: 26 UNIT/L (ref 10–44)
ANION GAP (OHS): 8 MMOL/L (ref 8–16)
AST SERPL-CCNC: 22 UNIT/L (ref 11–45)
BASOPHILS # BLD AUTO: 0.05 K/UL
BASOPHILS NFR BLD AUTO: 0.9 %
BILIRUB SERPL-MCNC: 0.6 MG/DL (ref 0.1–1)
BUN SERPL-MCNC: 17 MG/DL (ref 8–23)
CALCIUM SERPL-MCNC: 9.1 MG/DL (ref 8.7–10.5)
CHLORIDE SERPL-SCNC: 106 MMOL/L (ref 95–110)
CHOLEST SERPL-MCNC: 196 MG/DL (ref 120–199)
CHOLEST/HDLC SERPL: 3.4 {RATIO} (ref 2–5)
CO2 SERPL-SCNC: 26 MMOL/L (ref 23–29)
CREAT SERPL-MCNC: 0.9 MG/DL (ref 0.5–1.4)
EAG (OHS): 100 MG/DL (ref 68–131)
ERYTHROCYTE [DISTWIDTH] IN BLOOD BY AUTOMATED COUNT: 12.7 % (ref 11.5–14.5)
GFR SERPLBLD CREATININE-BSD FMLA CKD-EPI: >60 ML/MIN/1.73/M2
GLUCOSE SERPL-MCNC: 85 MG/DL (ref 70–110)
HBA1C MFR BLD: 5.1 % (ref 4–5.6)
HCT VFR BLD AUTO: 46.6 % (ref 40–54)
HDLC SERPL-MCNC: 57 MG/DL (ref 40–75)
HDLC SERPL: 29.1 % (ref 20–50)
HGB BLD-MCNC: 15.1 GM/DL (ref 14–18)
IMM GRANULOCYTES # BLD AUTO: 0.01 K/UL (ref 0–0.04)
IMM GRANULOCYTES NFR BLD AUTO: 0.2 % (ref 0–0.5)
LDLC SERPL CALC-MCNC: 124.2 MG/DL (ref 63–159)
LYMPHOCYTES # BLD AUTO: 1.69 K/UL (ref 1–4.8)
MCH RBC QN AUTO: 31.4 PG (ref 27–31)
MCHC RBC AUTO-ENTMCNC: 32.4 G/DL (ref 32–36)
MCV RBC AUTO: 97 FL (ref 82–98)
NONHDLC SERPL-MCNC: 139 MG/DL
NUCLEATED RBC (/100WBC) (OHS): 0 /100 WBC
PLATELET # BLD AUTO: 276 K/UL (ref 150–450)
PMV BLD AUTO: 9.8 FL (ref 9.2–12.9)
POTASSIUM SERPL-SCNC: 3.9 MMOL/L (ref 3.5–5.1)
PROT SERPL-MCNC: 7.1 GM/DL (ref 6–8.4)
PSA SERPL-MCNC: 0.08 NG/ML
RBC # BLD AUTO: 4.81 M/UL (ref 4.6–6.2)
RELATIVE EOSINOPHIL (OHS): 3.4 %
RELATIVE LYMPHOCYTE (OHS): 28.8 % (ref 18–48)
RELATIVE MONOCYTE (OHS): 10.2 % (ref 4–15)
RELATIVE NEUTROPHIL (OHS): 56.5 % (ref 38–73)
SODIUM SERPL-SCNC: 140 MMOL/L (ref 136–145)
TRIGL SERPL-MCNC: 74 MG/DL (ref 30–150)
TSH SERPL-ACNC: 2.36 UIU/ML (ref 0.4–4)
WBC # BLD AUTO: 5.87 K/UL (ref 3.9–12.7)

## 2025-04-09 PROCEDURE — 85025 COMPLETE CBC W/AUTO DIFF WBC: CPT

## 2025-04-09 PROCEDURE — 83036 HEMOGLOBIN GLYCOSYLATED A1C: CPT

## 2025-04-09 PROCEDURE — 36415 COLL VENOUS BLD VENIPUNCTURE: CPT

## 2025-04-09 PROCEDURE — 80061 LIPID PANEL: CPT

## 2025-04-09 PROCEDURE — 84155 ASSAY OF PROTEIN SERUM: CPT

## 2025-04-09 PROCEDURE — 84153 ASSAY OF PSA TOTAL: CPT

## 2025-04-09 PROCEDURE — 84443 ASSAY THYROID STIM HORMONE: CPT

## 2025-04-10 ENCOUNTER — OFFICE VISIT (OUTPATIENT)
Dept: INTERNAL MEDICINE | Facility: CLINIC | Age: 70
End: 2025-04-10
Payer: COMMERCIAL

## 2025-04-10 ENCOUNTER — HOSPITAL ENCOUNTER (OUTPATIENT)
Dept: CARDIOLOGY | Facility: CLINIC | Age: 70
Discharge: HOME | End: 2025-04-10
Payer: COMMERCIAL

## 2025-04-10 ENCOUNTER — RESULTS FOLLOW-UP (OUTPATIENT)
Dept: INTERNAL MEDICINE | Facility: CLINIC | Age: 70
End: 2025-04-10

## 2025-04-10 VITALS
HEIGHT: 70 IN | BODY MASS INDEX: 25.91 KG/M2 | SYSTOLIC BLOOD PRESSURE: 134 MMHG | OXYGEN SATURATION: 99 % | WEIGHT: 181 LBS | DIASTOLIC BLOOD PRESSURE: 74 MMHG | HEART RATE: 60 BPM

## 2025-04-10 DIAGNOSIS — Z00.00 ENCOUNTER FOR SCREENING AND PREVENTATIVE CARE: ICD-10-CM

## 2025-04-10 DIAGNOSIS — Z85.828 HISTORY OF SKIN CANCER: ICD-10-CM

## 2025-04-10 DIAGNOSIS — Z85.46 HISTORY OF PROSTATE CANCER: ICD-10-CM

## 2025-04-10 DIAGNOSIS — S68.119S AMPUTATION OF FINGER, SEQUELA: ICD-10-CM

## 2025-04-10 DIAGNOSIS — Z12.11 SCREENING FOR COLON CANCER: ICD-10-CM

## 2025-04-10 DIAGNOSIS — Z00.00 ENCOUNTER FOR SCREENING AND PREVENTATIVE CARE: Primary | ICD-10-CM

## 2025-04-10 DIAGNOSIS — R00.1 SINUS BRADYCARDIA ON ECG: ICD-10-CM

## 2025-04-10 DIAGNOSIS — M53.3 SI (SACROILIAC) PAIN: ICD-10-CM

## 2025-04-10 DIAGNOSIS — M53.2X6 LUMBAR SPINE INSTABILITY: ICD-10-CM

## 2025-04-10 DIAGNOSIS — M19.90 OSTEOARTHRITIS, UNSPECIFIED OSTEOARTHRITIS TYPE, UNSPECIFIED SITE: ICD-10-CM

## 2025-04-10 DIAGNOSIS — Z01.818 PREOPERATIVE CLEARANCE: ICD-10-CM

## 2025-04-10 PROBLEM — R52 PAIN: Status: RESOLVED | Noted: 2019-10-15 | Resolved: 2025-04-10

## 2025-04-10 LAB
OHS QRS DURATION: 90 MS
OHS QTC CALCULATION: 404 MS

## 2025-04-10 PROCEDURE — 93010 ELECTROCARDIOGRAM REPORT: CPT | Mod: S$GLB,,, | Performed by: INTERNAL MEDICINE

## 2025-04-10 PROCEDURE — 99999 PR PBB SHADOW E&M-EST. PATIENT-LVL III: CPT | Mod: PBBFAC,,, | Performed by: EMERGENCY MEDICINE

## 2025-04-10 NOTE — PROGRESS NOTES
[facility]      TODAY'S Date 4/10/2025  Patient ID: Juan Luis Mcgrath is a 69 y.o. male   MRN: [id number]  Primary Care Physician (PCP):  Unable, To Obtain    SUBJECTIVE         Chief Complaint:   Chief Complaint   Patient presents with    [facility]     Has been well, anesthesiologist      HISTORY OF PRESENT ILLNESS (HPI):   Reviewed medical, surgical, social and family history, medications, appropriate preventive health screenings, as well as vaccination history. Updates as noted below or in assessment and plan.    This is a very pleasant 69 y.o. nonsmoking male with PMHx degenerative joint disease, prostate cancer, squamous cell cancer of the bone, skin cancer who is presenting for his usual annual exam in [facility] and preoperative clearance for left shoulder surgery.  The patient is currently in good health, with the exception of some arthralgias.  Of note, patient maintains that he is generally physically active and believes that some have his orthopedic injuries are related to how active he was when he was younger.  For example, he had ACL repair in number of years ago.  He no longer participates in previous athletic activities such as soccer and windsurfing due to these physical limitations.His surgeries were not just related to orthopedic complaints as he underwent prostate surgery 12-15 years ago .  He has been under general anesthesia before and is never had any difficulty.      PREVENTIVE CARE:  He had dermatology evaluation yesterday. FIT test was completed two years ago.  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   MEDICATIONS:  He takes ibuprofen regularly, with the last dose taken today. He denies use of anticoagulants or aspirin.    SOCIAL HISTORY:  He consumes coffee throughout the day and has limited water  intake of only a few glasses per day. He last consumed alcohol 2-3 months ago and denies history of smoking.      ROS:  General: -fever, -chills, -fatigue, -weight gain, -weight loss  Eyes: -vision changes, -redness, -discharge  ENT: -ear pain, -nasal congestion, -sore throat  Cardiovascular: -chest pain, -palpitations, -lower extremity edema  Respiratory: -cough, -shortness of breath  Gastrointestinal: -abdominal pain, -nausea, -vomiting, -diarrhea, -constipation, -blood in stool  Genitourinary: -dysuria, -hematuria, -frequency  Musculoskeletal: +joint pain, -muscle pain, +pain with movement, +back pain  Skin: -rash, -lesion  Neurological: -headache, -dizziness, -numbness, -tingling  Psychiatric: -anxiety, -depression, -sleep difficulty           A review of medical records indicates patient has seen the following specialists:   Pain Medicine - Yamila Maher, NP  Urology - Sang Kelly MD and Rodo Renee MD  for prostate cancer  Podiatry - Campbell See, DPM  Optometry - Jessica Woods OD  Orthopedic Surgery - Alexandre Garcia MD  Sports Medicine - LAUREN Chambers MD  Dermatology  - Luis Lerma M.D, Myron Altamirano MD and Yesenia Zamora MD  Chiropractic Medicine  - Ana Maria Marc DC  Hand Surgery  - Miladis Mccain PA, Jazmin Hammond PA-C          Immunization History   Administered Date(s) Administered    COVID-19, MRNA, LN-S, PF (Pfizer) (Purple Cap) 01/12/2021, 01/31/2021, 10/07/2021    Influenza 11/28/2018, 11/08/2019    Influenza - Quadrivalent - PF *Preferred* (6 months and older) 12/14/2016, 11/30/2017, 11/15/2018, 09/16/2020, 12/29/2021, 11/11/2022    Zoster Recombinant 01/08/2020, 01/08/2020, 12/29/2021     Past Medical History:   Diagnosis Date    Degenerative joint disease (DJD) of lumbar spine     History of cold sores     History of skin cancer 8/19/2024    Prostate cancer 2013    Sacroiliitis 04/07/2021    Squamous cell carcinoma 09/23/2019     Past Surgical  History:   Procedure Laterality Date    ANTERIOR CRUCIATE LIGAMENT REPAIR      APPENDECTOMY      FINGER AMPUTATION Right 9/23/2019    Procedure: AMPUTATION, FINGER;  Surgeon: Luna Tirado MD;  Location: [facility] OR;  Service: Orthopedics;  Laterality: Right;  regional MAC    FINGER MASS EXCISION Right 9/9/2019    Procedure: EXCISION, MASS, FINGER small finger resection;  Surgeon: Luna Tirado MD;  Location: [facility] OR;  Service: Orthopedics;  Laterality: Right;  regional MAC    INJECTION OF JOINT Right 7/22/2020    Procedure: INJECTION RIGHT SI JOINT AND RIGHT PIRIFORMIS INJECTION;  Surgeon: Roosevelt Reddy MD;  Location: [facility] PAIN MGT;  Service: Pain Management;  Laterality: Right;  NEEDS CONSENT, URGENT    INJECTION OF JOINT Right 4/7/2021    Procedure: INJECTION, JOINT, SI;  Surgeon: Roosevelt Reddy MD;  Location: [facility] PAIN MGT;  Service: Pain Management;  Laterality: Right;    PROSTATECTOMY      radical    REMOVAL OF NAIL OF DIGIT Right 8/19/2019    Procedure: REMOVAL, NAIL, DIGIT right small finger;  Surgeon: Luna Tirado MD;  Location: [facility] OR;  Service: Orthopedics;  Laterality: Right;    TRANSFORAMINAL EPIDURAL INJECTION OF STEROID Right 10/4/2023    Procedure: INJECTION, STEROID, EPIDURAL, TRANSFORAMINAL APPROACH, RIGHT L4/L5 AND L5/S1;  Surgeon: Roosevelt Reddy MD;  Location: [facility] PAIN MGT;  Service: Pain Management;  Laterality: Right;     Family History   Problem Relation Name Age of Onset    No Known Problems Mother      Prostate cancer Father      Prostate cancer Maternal Uncle      Melanoma Neg Hx      Colon cancer Neg Hx       Social History[1]  Past Medical, Surgical and Social history reviewed and verified by me.     Review of patient's allergies indicates:  No Known Allergies  Medications Ordered Prior to Encounter[2]    OBJECTIVE     PHYSICAL EXAM  Vitals:    04/10/25 1108   BP: 134/74   BP Location: Right arm   Patient Position: Sitting   Pulse: 60   SpO2: 99%  "  Weight: 82.1 kg (181 lb)   Height: 5' 10" (1.778 m)     Vital Signs (Most Recent):  Pulse: 60 (04/10/25 1108)  BP: 134/74 (04/10/25 1108)  SpO2: 99 % (04/10/25 1108)   Weight:   Wt Readings from Last 1 Encounters:   04/10/25 82.1 kg (181 lb)     Body mass index is 25.97 kg/m².    Vital signs and nursing assessment noted:  Relatively normal vitals    GEN:   NAD, A & Ox3, atraumatic, well appearing, nontoxic appearing  HEENT:  PERRLA, EOMI, moist membranes, nl conjunctiva, no scleral icterus, no nystagmus, no nodes/nodules, soft, supple, FROM, no trachial deviation, nexus negative, TM partially obscured by cerumen, normal pharynx  CV:   RRR no m/r/g, 2+ radial pulses, <2sec cap refill, no obvious JVD  RESP:  CTA B, no w/r/r, equal and bilateral chest rise, no respiratory distress  ABD:   soft, Nontender, Nondistended, +BS, no guarding/rebound  :   Deferred  BACK:  FROM, no midline tenderness, no paraspinal tenderness  EXT:   FROM to tender left shoulder secondary to pain, otherwise full range of motion to all other extremities and no bony tenderness,, VARGAS x 4, no swelling, no edema, , no warmth or redness, no crepitus, no obvious deformity other than amputation to distal 5th right finger  LYMPH:  no gross adenopathy  NEURO:  GCS 15, CN II-XII grossly intact, no obvious motor/sensory deficit, no tremor, negative Romberg,  nl gait/coordination  PSYCH:   Cooperative, no SI/HI, no anxiety, nl mood/affect, nl judgement/thought process  SKIN:  no rashes/lesions or masses, nl color, no pallor, warm, dry, intact    Tests    EKG  Results for orders placed or performed during the hospital encounter of 04/10/25   SCHEDULED EKG 12-LEAD (to Muse)    Collection Time: 04/10/25 11:49 AM   Result Value Ref Range    QRS Duration 90 ms    [facility] QTC Calculation 404 ms    Narrative    Test Reason : Z00.00,    Vent. Rate :  50 BPM     Atrial Rate :  50 BPM     P-R Int : 160 ms          QRS Dur :  90 ms      QT Int : 444 ms       P-R-T " Axes :  47  22  31 degrees    QTcB Int : 404 ms    Sinus bradycardia  Otherwise normal ECG  No previous ECGs available  Confirmed by José Giles ([id number]) on 4/10/2025 2:21:06 PM    Referred By: BUCK ARNDT           Confirmed By: José Giles        Labs reviewed and independently interpreted. Imaging studies reviewed.   Recent Results (from the past 12 weeks)   Comprehensive Metabolic Panel    Collection Time: 04/09/25  7:32 AM   Result Value Ref Range    Sodium 140 136 - 145 mmol/L    Potassium 3.9 3.5 - 5.1 mmol/L    Chloride 106 95 - 110 mmol/L    CO2 26 23 - 29 mmol/L    Glucose 85 70 - 110 mg/dL    BUN 17 8 - 23 mg/dL    Creatinine 0.9 0.5 - 1.4 mg/dL    Calcium 9.1 8.7 - 10.5 mg/dL    Protein Total 7.1 6.0 - 8.4 gm/dL    Albumin 3.8 3.5 - 5.2 g/dL    Bilirubin Total 0.6 0.1 - 1.0 mg/dL    ALP 70 40 - 150 unit/L    AST 22 11 - 45 unit/L    ALT 26 10 - 44 unit/L    Anion Gap 8 8 - 16 mmol/L    eGFR >60 >60 mL/min/1.73/m2   Hemoglobin A1C    Collection Time: 04/09/25  7:32 AM   Result Value Ref Range    Hemoglobin A1c 5.1 4.0 - 5.6 %    Estimated Average Glucose 100 68 - 131 mg/dL   Prostate Specific Antigen, Diagnostic    Collection Time: 04/09/25  7:32 AM   Result Value Ref Range    Prostate Specific Antigen 0.08 <=4.00 ng/mL   TSH    Collection Time: 04/09/25  7:32 AM   Result Value Ref Range    TSH 2.364 0.400 - 4.000 uIU/mL   Lipid Panel    Collection Time: 04/09/25  7:32 AM   Result Value Ref Range    Cholesterol Total 196 120 - 199 mg/dL    Triglyceride 74 30 - 150 mg/dL    HDL Cholesterol 57 40 - 75 mg/dL    LDL Cholesterol 124.2 63.0 - 159.0 mg/dL    HDL/Cholesterol Ratio 29.1 20.0 - 50.0 %    Cholesterol/HDL Ratio 3.4 2.0 - 5.0    Non HDL Cholesterol 139 mg/dL   CBC with Differential    Collection Time: 04/09/25  7:32 AM   Result Value Ref Range    WBC 5.87 3.90 - 12.70 K/uL    RBC 4.81 4.60 - 6.20 M/uL    HGB 15.1 14.0 - 18.0 gm/dL    HCT 46.6 40.0 - 54.0 %    MCV 97 82 - 98 fL    MCH 31.4 (H)  27.0 - 31.0 pg    MCHC 32.4 32.0 - 36.0 g/dL    RDW 12.7 11.5 - 14.5 %    Platelet Count 276 150 - 450 K/uL    MPV 9.8 9.2 - 12.9 fL    Nucleated RBC 0 <=0 /100 WBC    Neut % 56.5 38 - 73 %    Lymph % 28.8 18 - 48 %    Mono % 10.2 4 - 15 %    Eos % 3.4 <=8 %    Basophil % 0.9 <=1.9 %    Imm Grans % 0.2 0.0 - 0.5 %    Neut # 3.32 1.8 - 7.7 K/uL    Lymph # 1.69 1 - 4.8 K/uL    Mono # 0.60 0.3 - 1 K/uL    Eos # 0.20 <=0.5 K/uL    Baso # 0.05 <=0.2 K/uL    Imm Grans # 0.01 0.00 - 0.04 K/uL   SCHEDULED EKG 12-LEAD (to Muse)    Collection Time: 04/10/25 11:49 AM   Result Value Ref Range    QRS Duration 90 ms    OHS QTC Calculation 404 ms       Latest Reference Range & Units 04/06/23 09:45   Fecal Immunochemical Test (iFOBT) Negative  Negative     MRI SHOULDER WITHOUT CONTRAST LEFT December 2024 Impression:   1. Supraspinatus tendinosis with a medium-sized, high-grade partial-thickness interstitial/concealed footprint tear with suspected bursal surface extension.  No retraction or volume loss.  2. Subscapularis tendinosis with a partial-thickness intrasubstance/concealed footprint tear.  3. Infraspinatus tendinosis.  4. Degenerative fraying/tearing of the superior labrum.  5. Mild glenohumeral osteoarthritis with a 1.4 x 1.4 cm full-thickness chondral defect of the humeral head.  6. AC joint arthrosis and prominent subacromial spurring.  7. Joint effusion with synovitis.  8. 1.2 x 0.7 cm loose body at the subscapularis recess, likely osteocartilaginous in nature.  9. Subacromial/subdeltoid bursitis.    XR SHOULDER COMPLETE 2 OR MORE VIEWS LEFT December 2024 FINDINGS: Mild DJD.  No fracture or dislocation.  No bone destruction identified.  No soft tissue calcifications.       XR LUMBAR SPINE AP AND LAT WITH FLEX/EXT July 2024 Impression: Significant spondylosis of the lower lumbar spine.    X-ray Knee Ortho Left with Flexion Jan 2023 Impression: 1. No acute displaced fracture or dislocation of the left knee.     MRI Knee  Without Contrast Left Aug 2022 Impression:   1. Postsurgical changes of prior ACL reconstruction.  No evidence for complication.  2. Truncation of the medial meniscus, likely sequela of prior meniscectomy.  Complex T2 hyperintense signal within the posterior horn, possible retear.  Minimal free edge irregularity of the lateral meniscus.  3. Partial-thickness chondral fissuring over the patella and medial femoral condyle.     MRI Lumbar Spine Without Contrast Aug 2022 Impression:   1. Multilevel degenerative changes of the lumbar spine detailed above.  Moderate spinal canal stenosis noted at L4-L5.  Moderate left neural foraminal narrowing noted at L5-S1.     X-Ray Pelvis Routine AP Aug 2022 Impression: No significant conventional radiographic evidence relating to the hips is appreciated.  Facet arthropathy in the lower lumbar spine is seen.          CT Chest With Contrast Sept 2019 Impression:  1. We detect no metastatic disease in the chest in this patient with history of squamous cell skin cancer of the distal right upper extremity.  2. Coronary artery calcification.     MRI Upper Extremity Joint WO Cont Left March 2017 IMPRESSION:   1.  Tendinosis, partial thickness tear along the articular surface of the anterior footprint, and mild bursal surface fraying of the supraspinatus tendon.  2. Moderate AC joint arthropathy.     ASSESSMENT/PLAN:   MDM  Reviewed: previous chart, nursing note and vitals  Reviewed previous: labs, x-ray, MRI and ECG  Interpretation: labs and ECG (relatively unremarkable Lipid Panel, CMP, CBC, TSH, HgA1C, PSA)      Health Maintenance   Topic Date Due    TETANUS VACCINE  Never done    RSV Vaccine (Age 60+ and Pregnant patients) (1 - Risk 60-74 years 1-dose series) Never done    Colorectal Cancer Screening  04/07/2023    Influenza Vaccine (1) 09/01/2024    COVID-19 Vaccine (4 - 2024-25 season) 09/01/2024    Hepatitis C Screening  Completed    Shingles Vaccine  Completed    Pneumococcal  Vaccines (Age 50+)  Discontinued       Juan Luis was seen today for [facility] the [facility].    Diagnoses and all orders for this visit:    Encounter for screening and preventative care  -     SCHEDULED EKG 12-LEAD (to Muse); Future    Screening for colon cancer  -     Cologuard Screening (Multitarget Stool DNA); Future  -     Cologuard Screening (Multitarget Stool DNA)    Osteoarthritis, unspecified osteoarthritis type, unspecified site  -     Connected Back Care Companion    SI (sacroiliac) pain  -     Connected Back Care Companion    Lumbar spine instability  -     Connected Back Care Companion    History of prostate cancer    History of skin cancer    Amputation of finger, sequela    Sinus bradycardia on ECG    Preoperative clearance     Assessment & Plan      Encounter for Screening and Preventative Care  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  Reviewed medical, surgical, social, and family history, medication use, preventive care, and vaccination status.  Encouraged continued focus on routine screenings, lifestyle optimization, and wellness strategies.  SCHEDULED EKG 12-LEAD (to Muse); Future - Ordered as part of surgical preoperative clearance and for sinus bradycardia evaluation.    Screening for Colon Cancer  Reviewed colorectal cancer screening options.  Discussed differences between FIT testing and Cologuard (multitarget stool DNA).  Patient prefers Cologuard.  Cologuard Screening (Multitarget Stool DNA); Future - Kit to be sent to patient.  Fecal Immunochemical Test (iFOBT)- Last completed two years ago.    Osteoarthritis, Unspecified Type, Unspecified Site  History of degenerative joint disease with ongoing symptoms.  Pain and mobility limitations primarily related to past orthopedic injuries and spinal issues.  Connected Back Care Companion - Offered for symptom monitoring and supportive exercises.    SI (Sacroiliac) Pain  Reports discomfort in lower back and pelvis, consistent with SI joint  pain.  Managed conservatively with exercise, posture, and activity modifications.  Connected Back Care Companion - Enrolled for structured support and monitoring.    Lumbar Spine Instability  History of spinal arthritis and limited range of motion, with impact on physical activity.  Conservative management encouraged.  Connected Back Care Companion - Continued use for back stability and guided physical therapy.    History of Prostate Cancer  Status post prostatectomy ~12-15 years ago.  No current symptoms or recurrence concerns reported.    History of Skin Cancer  History of basal cell carcinoma and precancerous facial lesions.  Recent dermatologic evaluation completed.  Ongoing surveillance recommended with continued daily sunscreen use and sun avoidance.    Amputation of Finger, Sequela  History of squamous cell of bone with resulting finger amputation.  No current complications.    Sinus Bradycardia on ECG  EKGs consistently show asymptomatic sinus bradycardia.  Patient is physically active and without symptoms (e.g., dizziness, syncope).  No additional workup required unless symptoms arise.    Preoperative Clearance  69-year-old male presents for preoperative clearance for left shoulder arthroscopy and rotator cuff repair scheduled May 9, 2025.  Reviewed medical history, surgical risk, anesthesia tolerance, labs, and imaging.  Patient has a history of general anesthesia without complications.  Based on current health status, physical activity level, and exam findings, he is cleared for surgery and anesthesia.  SCHEDULED EKG 12-LEAD ordered for documentation.    The results of physical exam findings, labs, and imaging were reviewed with the patient. Management of above assessments/visit diagnoses was discussed with patient. Precautions for return discussed at length. Patient was given ample time for questions. All questions asked and answered to the satisfaction of the patient. Patient is in agreement with the  above and verbalized understanding. Total time spent caring for the patient today was 40 minutes. This includes time spent before the visit reviewing the chart, time spent during the visit, and time spent after the visit on documentation.    Sharad Camejo MD  [facility]    Disclaimer: This document was created using voice recognition software (M*Modal Fluency Direct). Although it may be edited, this document may contain errors related to incorrect recognition of the spoken word. Please contact the physician if clarification is needed.         [1]   Social History  Tobacco Use    Smoking status: Never    Smokeless tobacco: Never   Substance Use Topics    Alcohol use: Yes     Comment: social    Drug use: No   [2]   Current Outpatient Medications on File Prior to Visit   Medication Sig Dispense Refill    celecoxib (CELEBREX) 200 MG capsule Take 1 capsule (200 mg total) by mouth once daily. Take once per day 40 capsule 1    hydroquinone 8 % Emul Compound hydroquinone 8% / tretinoin 0.025% / kojic acid 1% / niacinamide 4% / fluocinolone 0.025% cream. Apply a thin layer to dark spots on cheeks qhs. Do not use for longer than 12 weeks in a row then take a 12 week break 30 g 1    [DISCONTINUED] meloxicam (MOBIC) 15 MG tablet Take 1 tablet (15 mg total) by mouth once daily. 30 tablet 2    [DISCONTINUED] sildenafiL (VIAGRA) 50 MG tablet Take 50 mg by mouth. as directed       Current Facility-Administered Medications on File Prior to Visit   Medication Dose Route Frequency Provider Last Rate Last Admin    0.9%  NaCl infusion   Intravenous Continuous Anthony Juan MD

## 2025-04-15 ENCOUNTER — PATIENT OUTREACH (OUTPATIENT)
Dept: OTHER | Facility: OTHER | Age: 70
End: 2025-04-15
Payer: COMMERCIAL

## 2025-04-29 ENCOUNTER — PATIENT MESSAGE (OUTPATIENT)
Dept: PREADMISSION TESTING | Facility: HOSPITAL | Age: 70
End: 2025-04-29
Payer: COMMERCIAL

## 2025-04-29 NOTE — ANESTHESIA PAT ROS NOTE
04/29/2025  Juan Luis Mcgrath is a 69 y.o., male.      Pre-op Assessment    I have reviewed the Patient Summary Reports.       I have reviewed the Medications.     Review of Systems  Anesthesia Hx:  No problems with previous Anesthesia   History of prior surgery of interest to airway management or planning:  Previous anesthesia: Nerve Block      for 9/23/2019  Amputation right small finger.  Procedure performed at an [facility].     Denies Personal Hx of Anesthesia complications.                    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 CAD  asymptomatic  Denies CABG/stent.    Denies Angina.       Denies KEARNEY.  ECG has been reviewed. Coronary artery calcification noted on CT of chest Patient not on beta blockers                          Pulmonary:  Pulmonary Normal   Denies COPD.  Denies Asthma.   Denies Shortness of breath.                  Renal/:   Denies Chronic Renal Disease.   Male stress incontinence             Hepatic/GI:      Denies GERD.   Appendectomy Not Taking GLP-1 Agonists            Musculoskeletal:  Arthritis   Nontraumatic incomplete tear of left rotator cuff,  Arthritis of left acromioclavicular joint,  Biceps tendinitis of left shoulder,  DJD of lumbar spine,   Lumbar spine instability,  S/P TESI,   Sacroiliitis, S/P Injection of SI Joint,   H/O ACL Repair,  Osteoarthritis       Spine Disorders: lumbar Degenerative disease and Chronic Pain           Neurological:  Neurology Normal    Denies CVA.    Denies Seizures.                                Endocrine:  Endocrine Normal Denies Diabetes. Denies Hypothyroidism.          Dermatological:  H/O cold sores   Psych:  Psychiatric Normal                   Past Medical History:   Diagnosis Date    Degenerative joint disease (DJD) of lumbar spine     History of cold sores     History of skin cancer 8/19/2024    Prostate cancer 2013    Sacroiliitis 04/07/2021    Squamous cell carcinoma 09/23/2019     Past Surgical History:   Procedure Laterality Date    ANTERIOR CRUCIATE LIGAMENT REPAIR      APPENDECTOMY      FINGER AMPUTATION Right 9/23/2019    Procedure: AMPUTATION, FINGER;  Surgeon: Luna Tirado MD;  Location: [facility];  Service: Orthopedics;  Laterality: Right;  regional MAC    FINGER MASS EXCISION Right 9/9/2019    Procedure: EXCISION, MASS, FINGER small finger resection;  Surgeon: Luna Tirado MD;  Location: [facility];  Service: Orthopedics;  Laterality: Right;  regional MAC    INJECTION OF JOINT Right 7/22/2020    Procedure: INJECTION RIGHT SI JOINT AND RIGHT PIRIFORMIS INJECTION;  Surgeon: Roosevelt Reddy MD;  Location: [facility];  Service: Pain Management;  Laterality: Right;  NEEDS CONSENT, URGENT    INJECTION OF JOINT Right 4/7/2021    Procedure: INJECTION, JOINT, SI;  Surgeon: Roosevelt Reddy MD;  Location: [facility];  Service: Pain Management;  Laterality: Right;    PROSTATECTOMY      radical    REMOVAL OF NAIL OF DIGIT Right 8/19/2019    Procedure: REMOVAL, NAIL, DIGIT right small finger;  Surgeon: Luna Tirado MD;  Location: [facility];  Service: Orthopedics;  Laterality: Right;    TRANSFORAMINAL EPIDURAL INJECTION OF STEROID Right 10/4/2023    Procedure: INJECTION, STEROID, EPIDURAL, TRANSFORAMINAL APPROACH, RIGHT L4/L5 AND L5/S1;  Surgeon: Roosevelt Reddy MD;  Location: [facility];  Service: Pain Management;  Laterality: Right;       Anesthesia Assessment: Preoperative EQUATION    Planned  Procedure: Procedure(s) (LRB):  ARTHROSCOPY, SHOULDER, W/ ROTATOR CUFF REPAIR (Left)  ARTHROSCOPY,SHOULDER,WITH BICEPS TENODESIS (Left)  ARTHROSCOPY, SHOULDER, WITH DISTAL CLAVICLE EXCISION (Left)  Requested Anesthesia Type:General/Regional  Surgeon: LAUREN Chambers MD  Service: Orthopedics  Known or anticipated Date of Surgery:5/9/2025    Surgeon notes: reviewed    Electronic QUestionnaire Assessment completed via nurse interview with patient.        Triage considerations:     The patient has no apparent active cardiac condition (No unstable coronary Syndrome such as severe unstable angina or recent [<1 month] myocardial infarction, decompensated CHF, severe valvular   disease or significant arrhythmia)    Previous anesthesia records:MAC, Nerve block for post-op pain, and No problems    Last PCP note: within 1 month , within [facility]   Subspecialty notes: Dermatology    Other important co-morbidities:  No co-morbidities noted.         EKG 4/10/2025:  Vent. Rate :  50 BPM     Atrial Rate :  50 BPM      P-R Int : 160 ms          QRS Dur :  90 ms       QT Int : 444 ms       P-R-T Axes :  47  22  31 degrees     QTcB Int : 404 ms   Sinus bradycardia   Otherwise normal ECG   No previous ECGs available   Confirmed by José Giles ([id number]) on 4/10/2025 2:21:06 PM        Tests already available:  Results have been reviewed.             Instructions given. (See in Nurse's note) Preop medication instructions sent via portal message.     Optimization:  Anesthesia Preop Clinic Assessment Not Indicated    Medical Opinion Indicated: Yes, PCP       Sub-specialist consult indicated:  No      Plan: Consultation:Patient's PCP for a statement of optimization     Patient  has previously scheduled Medical Appointment: 4/10/2025    Navigation:  Patient is cleared/ optimized for surgery and anesthesia by PCP.       Ht: 5'10  Wt: 82.1 kg (181 lb)  BMI: 25.97

## 2025-05-01 ENCOUNTER — OFFICE VISIT (OUTPATIENT)
Dept: SPORTS MEDICINE | Facility: CLINIC | Age: 70
End: 2025-05-01
Payer: COMMERCIAL

## 2025-05-01 ENCOUNTER — TELEPHONE (OUTPATIENT)
Dept: SPORTS MEDICINE | Facility: CLINIC | Age: 70
End: 2025-05-01
Payer: COMMERCIAL

## 2025-05-01 VITALS
DIASTOLIC BLOOD PRESSURE: 78 MMHG | SYSTOLIC BLOOD PRESSURE: 129 MMHG | HEIGHT: 70 IN | BODY MASS INDEX: 26.91 KG/M2 | WEIGHT: 187.94 LBS | HEART RATE: 58 BPM

## 2025-05-01 DIAGNOSIS — M54.16 LUMBAR RADICULOPATHY, CHRONIC: ICD-10-CM

## 2025-05-01 DIAGNOSIS — M75.112 NONTRAUMATIC INCOMPLETE TEAR OF LEFT ROTATOR CUFF: Primary | ICD-10-CM

## 2025-05-01 PROCEDURE — 99999 PR PBB SHADOW E&M-EST. PATIENT-LVL III: CPT | Mod: PBBFAC,,, | Performed by: PHYSICIAN ASSISTANT

## 2025-05-01 PROCEDURE — 99499 UNLISTED E&M SERVICE: CPT | Mod: S$GLB,,, | Performed by: PHYSICIAN ASSISTANT

## 2025-05-01 RX ORDER — ONDANSETRON 4 MG/1
4 TABLET, ORALLY DISINTEGRATING ORAL EVERY 8 HOURS PRN
Qty: 30 TABLET | Refills: 0 | Status: SHIPPED | OUTPATIENT
Start: 2025-05-01

## 2025-05-01 RX ORDER — SODIUM CHLORIDE 9 MG/ML
INJECTION, SOLUTION INTRAVENOUS CONTINUOUS
OUTPATIENT
Start: 2025-05-01

## 2025-05-01 RX ORDER — ASPIRIN 81 MG/1
81 TABLET ORAL 2 TIMES DAILY
Qty: 28 TABLET | Refills: 0 | Status: SHIPPED | OUTPATIENT
Start: 2025-05-01 | End: 2025-05-15

## 2025-05-01 RX ORDER — OXYCODONE HYDROCHLORIDE 5 MG/1
5-10 TABLET ORAL
Qty: 28 TABLET | Refills: 0 | Status: SHIPPED | OUTPATIENT
Start: 2025-05-01

## 2025-05-01 NOTE — TELEPHONE ENCOUNTER
----- Message from Jimmy sent at 5/1/2025 10:19 AM CDT -----  Type: General Call Back Name of Caller:pt Reason pt has is getting scheduled for surgery on 05/09 the patient wants to know if he can be scheduled as the first surgery of the day Would the patient rather a call back or a response via [facility]? Call Best Call Back Number: [phone] Information:

## 2025-05-01 NOTE — H&P (VIEW-ONLY)
Juan Luis Mcgrath  is here for a completion of his perioperative paperwork. he  Is scheduled to undergo Left shoulder arthroscopic rotator cuff repair versus debridement as indicated, possible xenograft/allograft patch augmentation, extensive debridement, biceps tenodesis, subacromial decompression, distal clavicle excision on 5/9/2025.  He is a healthy individual and does need clearance for this procedure.     CLEARED per PCP preoperatively.    Wants PNC.    Seen today in clinic by Dr. Chambers. No AC joint tenderness on exam.    Risks, indications and benefits of the surgical procedure were discussed with the patient. All questions with regard to surgery, rehab, expected return to functional activities, activities of daily living and recreational endeavors were answered to his satisfaction.    Discussed COVID-19 with the patient, they are aware of our current policies and procedures, were given the option of delaying surgery, and they elect to proceed.    Patient was informed and understands the risks of surgery are greater for patients with a current condition or hx of heart disease, obesity, clotting disorders, recurrent infections, steroid use, current or past smoking, and factors such as sedentary lifestyle and noncompliance with medications, therapy or f/u. The degree of the increased risk is hard to estimate w/ any degree of precision.    Once no other questions were asked, a brief history and physical exam was then performed.    PAST MEDICAL HISTORY:   Past Medical History:   Diagnosis Date    Degenerative joint disease (DJD) of lumbar spine     History of cold sores     History of skin cancer 8/19/2024    Prostate cancer 2013    Sacroiliitis 04/07/2021    Squamous cell carcinoma 09/23/2019     PAST SURGICAL HISTORY:   Past Surgical History:   Procedure Laterality Date    ANTERIOR CRUCIATE LIGAMENT REPAIR      APPENDECTOMY      FINGER AMPUTATION Right 9/23/2019    Procedure: AMPUTATION, FINGER;  Surgeon:  Luna Tirado MD;  Location: [facility];  Service: Orthopedics;  Laterality: Right;  regional MAC    FINGER MASS EXCISION Right 9/9/2019    Procedure: EXCISION, MASS, FINGER small finger resection;  Surgeon: Luna Tirado MD;  Location: [facility];  Service: Orthopedics;  Laterality: Right;  regional MAC    INJECTION OF JOINT Right 7/22/2020    Procedure: INJECTION RIGHT SI JOINT AND RIGHT PIRIFORMIS INJECTION;  Surgeon: Roosevelt Reddy MD;  [facility];  Service: Pain Management;  Laterality: Right;  NEEDS CONSENT, URGENT    INJECTION OF JOINT Right 4/7/2021    Procedure: INJECTION, JOINT, SI;  Surgeon: Roosevelt Reddy MD;  [facility];  Service: Pain Management;  Laterality: Right;    PROSTATECTOMY      radical    REMOVAL OF NAIL OF DIGIT Right 8/19/2019    Procedure: REMOVAL, NAIL, DIGIT right small finger;  Surgeon: Luna Tirado MD;  Location: [facility];  Service: Orthopedics;  Laterality: Right;    TRANSFORAMINAL EPIDURAL INJECTION OF STEROID Right 10/4/2023    Procedure: INJECTION, STEROID, EPIDURAL, TRANSFORAMINAL APPROACH, RIGHT L4/L5 AND L5/S1;  Surgeon: Roosevelt Reddy MD;  Location: [facility];  Service: Pain Management;  Laterality: Right;     FAMILY HISTORY:   Family History   Problem Relation Name Age of Onset    No Known Problems Mother      Prostate cancer Father      Prostate cancer Maternal Uncle      Melanoma Neg Hx      Colon cancer Neg Hx       SOCIAL HISTORY: Social History[1]    MEDICATIONS: Current Medications[2]  ALLERGIES: Review of patient's allergies indicates:  No Known Allergies    Review of Systems   Constitution: Negative. Negative for chills, fever and night sweats.   HENT: Negative for congestion and headaches.    Eyes: Negative for blurred vision, left vision loss and right vision loss.   Cardiovascular: Negative for chest pain and syncope.   Respiratory: Negative for cough and shortness of breath.    Endocrine: Negative for  polydipsia, polyphagia and polyuria.   Hematologic/Lymphatic: Negative for bleeding problem. Does not bruise/bleed easily.   Skin: Negative for dry skin, itching and rash.   Musculoskeletal: Negative for falls and muscle weakness.   Gastrointestinal: Negative for abdominal pain and bowel incontinence.   Genitourinary: Negative for bladder incontinence and nocturia.   Neurological: Negative for disturbances in coordination, loss of balance and seizures.   Psychiatric/Behavioral: Negative for depression. The patient does not have insomnia.    Allergic/Immunologic: Negative for hives and persistent infections.     PHYSICAL EXAM:  GEN: A&Ox3, WD WN NAD  HEENT: WNL  CHEST: CTAB, no W/R/R  HEART: RRR, no M/R/G   ABD: Soft, NT ND, BS x4 QUADS  MS: Refer to previous note for detailed MS exam  NEURO: CN II-XII intact       The surgical consent was then reviewed with the patient, who agreed with all the contents of the consent form and it was signed.     PHYSICAL THERAPY:  He was also instructed regarding physical therapy and will begin on POD#1-3. He is doing physical therapy at [facility].    POST OP CARE: Instructions were reviewed including care of the wound and dressing after surgery and when he can shower.     PAIN MANAGEMENT: Juan Luis Milligan Mcgrath was instructed regarding the Polar ice unit that will be in place after surgery and his postoperative pain medications.     MEDICATION:  Roxicodone 5 mg 1-2 q 4 hours PRN for pain  Zofran 4 mg q 8 hours PRN for nausea and vomiting.  Aspirin 81mg BID x 2 weeks for DVT prophylaxis starting on the evening after surgery.      Post op meds to be delivered bedside prior to discharge. Deliver to family if patient is in surgery at 5pm.     Patient was instructed to purchase and take Colace to counter possible GI side effects of taking opiates.     DVT prophylaxis was discussed with the patient today including risk factors for developing DVTs and  history of DVTs. The patient was asked if any specific recommendations were given from the doctor/s that did pre-operative surgical clearance.      If the patient was previously taking 81mg baby aspirin, they were told to not take additional baby aspirin, using the above stated aspirin and to restart the 81mg aspirin daily after completion of the aspirin dose.      Patient was also told to buy over the counter Prilosec medication and take it once daily for GI protection as long as they are taking NSAIDs or Aspirin.     The patient was told that narcotic pain medications may make them drowsy and instructions were given to not sign legal documents, drive or operate heavy machinery, cars, or equipment while under the influence of narcotic medications.     As there were no other questions to be asked, he was given my business card along with Dr. Chambers's business card if he has any questions or concerns prior to surgery or in the postop period.          [1]   Social History  Socioeconomic History    Marital status:    Occupational History    Occupation: anesthesiologist   Tobacco Use    Smoking status: Never    Smokeless tobacco: Never   Substance and Sexual Activity    Alcohol use: Yes     Comment: social    Drug use: No    Sexual activity: Yes     Partners: Female   Social History Narrative    Moved from [place] 3 weeks ago. Lives with wife.     Social Drivers of Health     Financial Resource Strain: Low Risk  (8/16/2024)    Overall Financial Resource Strain (CARDIA)     Difficulty of Paying Living Expenses: Not hard at all   Food Insecurity: No Food Insecurity (8/16/2024)    Hunger Vital Sign     Worried About Running Out of Food in the Last Year: Never true     Ran Out of Food in the Last Year: Never true   Transportation Needs: No Transportation Needs (9/5/2023)    PRAPARE - Transportation     Lack of Transportation (Medical): No     Lack of Transportation (Non-Medical): No   Physical Activity: Sufficiently  Active (8/16/2024)    Exercise Vital Sign     Days of Exercise per Week: 5 days     Minutes of Exercise per Session: 60 min   Stress: No Stress Concern Present (8/16/2024)    [employer] - Occupational Stress Questionnaire     Feeling of Stress : Only a little   Housing Stability: Unknown (9/5/2023)    Housing Stability Vital Sign     Unable to Pay for Housing in the Last Year: No     Unstable Housing in the Last Year: Patient refused   [2]   Current Outpatient Medications:     celecoxib (CELEBREX) 200 MG capsule, Take 1 capsule (200 mg total) by mouth once daily. Take once per day, Disp: 40 capsule, Rfl: 1    hydroquinone 8 % Emul, Compound hydroquinone 8% / tretinoin 0.025% / kojic acid 1% / niacinamide 4% / fluocinolone 0.025% cream. Apply a thin layer to dark spots on cheeks qhs. Do not use for longer than 12 weeks in a row then take a 12 week break (Patient not taking: Reported on 5/1/2025), Disp: 30 g, Rfl: 1  No current facility-administered medications for this visit.    Facility-Administered Medications Ordered in Other Visits:     0.9%  NaCl infusion, , Intravenous, Continuous, Anthony Juan MD

## 2025-05-01 NOTE — TELEPHONE ENCOUNTER
Spoke c pt. Confirmed that we have it noted he is requesting 1st case. Also confirmed that CAMELIA Shirley discussed his request for regional pain pump & has been OKed by Dr. Chambers. Pt will call c additional questions/concerns in interim. Pt expressed understanding & was thankful.

## 2025-05-01 NOTE — H&P
Juan Luis Mcgrath  is here for a completion of his perioperative paperwork. he  Is scheduled to undergo Left shoulder arthroscopic rotator cuff repair versus debridement as indicated, possible xenograft/allograft patch augmentation, extensive debridement, biceps tenodesis, subacromial decompression, distal clavicle excision on 5/9/2025.  He is a healthy individual and does need clearance for this procedure.     CLEARED per PCP preoperatively.    Wants PNC.    Seen today in clinic by Dr. Chambers. No AC joint tenderness on exam.    Risks, indications and benefits of the surgical procedure were discussed with the patient. All questions with regard to surgery, rehab, expected return to functional activities, activities of daily living and recreational endeavors were answered to his satisfaction.    Discussed COVID-19 with the patient, they are aware of our current policies and procedures, were given the option of delaying surgery, and they elect to proceed.    Patient was informed and understands the risks of surgery are greater for patients with a current condition or hx of heart disease, obesity, clotting disorders, recurrent infections, steroid use, current or past smoking, and factors such as sedentary lifestyle and noncompliance with medications, therapy or f/u. The degree of the increased risk is hard to estimate w/ any degree of precision.    Once no other questions were asked, a brief history and physical exam was then performed.    PAST MEDICAL HISTORY:   Past Medical History:   Diagnosis Date    Degenerative joint disease (DJD) of lumbar spine     History of cold sores     History of skin cancer 8/19/2024    Prostate cancer 2013    Sacroiliitis 04/07/2021    Squamous cell carcinoma 09/23/2019     PAST SURGICAL HISTORY:   Past Surgical History:   Procedure Laterality Date    ANTERIOR CRUCIATE LIGAMENT REPAIR      APPENDECTOMY      FINGER AMPUTATION Right 9/23/2019    Procedure: AMPUTATION, FINGER;  Surgeon:  Luna Tirado MD;  Location: [facility];  Service: Orthopedics;  Laterality: Right;  regional MAC    FINGER MASS EXCISION Right 9/9/2019    Procedure: EXCISION, MASS, FINGER small finger resection;  Surgeon: Luna Tirado MD;  Location: [facility];  Service: Orthopedics;  Laterality: Right;  regional MAC    INJECTION OF JOINT Right 7/22/2020    Procedure: INJECTION RIGHT SI JOINT AND RIGHT PIRIFORMIS INJECTION;  Surgeon: Roosevelt Reddy MD;  Location: [facility];  Service: Pain Management;  Laterality: Right;  NEEDS CONSENT, URGENT    INJECTION OF JOINT Right 4/7/2021    Procedure: INJECTION, JOINT, SI;  Surgeon: Roosevelt Reddy MD;  Location: [facility]IN T;  Service: Pain Management;  Laterality: Right;    PROSTATECTOMY      radical    REMOVAL OF NAIL OF DIGIT Right 8/19/2019    Procedure: REMOVAL, NAIL, DIGIT right small finger;  Surgeon: Luna Tirado MD;  Location: [facility];  Service: Orthopedics;  Laterality: Right;    TRANSFORAMINAL EPIDURAL INJECTION OF STEROID Right 10/4/2023    Procedure: INJECTION, STEROID, EPIDURAL, TRANSFORAMINAL APPROACH, RIGHT L4/L5 AND L5/S1;  Surgeon: Roosevelt Reddy MD;  Location: [facility];  Service: Pain Management;  Laterality: Right;     FAMILY HISTORY:   Family History   Problem Relation Name Age of Onset    No Known Problems Mother      Prostate cancer Father      Prostate cancer Maternal Uncle      Melanoma Neg Hx      Colon cancer Neg Hx       SOCIAL HISTORY: Social History[1]    MEDICATIONS: Current Medications[2]  ALLERGIES: Review of patient's allergies indicates:  No Known Allergies    Review of Systems   Constitution: Negative. Negative for chills, fever and night sweats.   HENT: Negative for congestion and headaches.    Eyes: Negative for blurred vision, left vision loss and right vision loss.   Cardiovascular: Negative for chest pain and syncope.   Respiratory: Negative for cough and shortness of breath.    Endocrine: Negative for  polydipsia, polyphagia and polyuria.   Hematologic/Lymphatic: Negative for bleeding problem. Does not bruise/bleed easily.   Skin: Negative for dry skin, itching and rash.   Musculoskeletal: Negative for falls and muscle weakness.   Gastrointestinal: Negative for abdominal pain and bowel incontinence.   Genitourinary: Negative for bladder incontinence and nocturia.   Neurological: Negative for disturbances in coordination, loss of balance and seizures.   Psychiatric/Behavioral: Negative for depression. The patient does not have insomnia.    Allergic/Immunologic: Negative for hives and persistent infections.     PHYSICAL EXAM:  GEN: A&Ox3, WD WN NAD  HEENT: WNL  CHEST: CTAB, no W/R/R  HEART: RRR, no M/R/G   ABD: Soft, NT ND, BS x4 QUADS  MS: Refer to previous note for detailed MS exam  NEURO: CN II-XII intact       The surgical consent was then reviewed with the patient, who agreed with all the contents of the consent form and it was signed.     PHYSICAL THERAPY:  He was also instructed regarding physical therapy and will begin on POD#1-3. He is doing physical therapy at [facility].    POST OP CARE: Instructions were reviewed including care of the wound and dressing after surgery and when he can shower.     PAIN MANAGEMENT: Juan Luis Milligan Mcgrath was instructed regarding the Polar ice unit that will be in place after surgery and his postoperative pain medications.     MEDICATION:  Roxicodone 5 mg 1-2 q 4 hours PRN for pain  Zofran 4 mg q 8 hours PRN for nausea and vomiting.  Aspirin 81mg BID x 2 weeks for DVT prophylaxis starting on the evening after surgery.      Post op meds to be delivered bedside prior to discharge. Deliver to family if patient is in surgery at 5pm.     Patient was instructed to purchase and take Colace to counter possible GI side effects of taking opiates.     DVT prophylaxis was discussed with the patient today including risk factors for developing DVTs and  history of DVTs. The patient was asked if any specific recommendations were given from the doctor/s that did pre-operative surgical clearance.      If the patient was previously taking 81mg baby aspirin, they were told to not take additional baby aspirin, using the above stated aspirin and to restart the 81mg aspirin daily after completion of the aspirin dose.      Patient was also told to buy over the counter Prilosec medication and take it once daily for GI protection as long as they are taking NSAIDs or Aspirin.     The patient was told that narcotic pain medications may make them drowsy and instructions were given to not sign legal documents, drive or operate heavy machinery, cars, or equipment while under the influence of narcotic medications.     As there were no other questions to be asked, he was given my business card along with Dr. Chambers's business card if he has any questions or concerns prior to surgery or in the postop period.          [1]   Social History  Socioeconomic History    Marital status:    Occupational History    Occupation: anesthesiologist   Tobacco Use    Smoking status: Never    Smokeless tobacco: Never   Substance and Sexual Activity    Alcohol use: Yes     Comment: social    Drug use: No    Sexual activity: Yes     Partners: Female   Social History Narrative    Moved from [place] 3 weeks ago. Lives with wife.     Social Drivers of Health     Financial Resource Strain: Low Risk  (8/16/2024)    Overall Financial Resource Strain (CARDIA)     Difficulty of Paying Living Expenses: Not hard at all   Food Insecurity: No Food Insecurity (8/16/2024)    Hunger Vital Sign     Worried About Running Out of Food in the Last Year: Never true     Ran Out of Food in the Last Year: Never true   Transportation Needs: No Transportation Needs (9/5/2023)    PRAPARE - Transportation     Lack of Transportation (Medical): No     Lack of Transportation (Non-Medical): No   Physical Activity: Sufficiently  Active (8/16/2024)    Exercise Vital Sign     Days of Exercise per Week: 5 days     Minutes of Exercise per Session: 60 min   Stress: No Stress Concern Present (8/16/2024)    [employer] - Occupational Stress Questionnaire     Feeling of Stress : Only a little   Housing Stability: Unknown (9/5/2023)    Housing Stability Vital Sign     Unable to Pay for Housing in the Last Year: No     Unstable Housing in the Last Year: Patient refused   [2]   Current Outpatient Medications:     celecoxib (CELEBREX) 200 MG capsule, Take 1 capsule (200 mg total) by mouth once daily. Take once per day, Disp: 40 capsule, Rfl: 1    hydroquinone 8 % Emul, Compound hydroquinone 8% / tretinoin 0.025% / kojic acid 1% / niacinamide 4% / fluocinolone 0.025% cream. Apply a thin layer to dark spots on cheeks qhs. Do not use for longer than 12 weeks in a row then take a 12 week break (Patient not taking: Reported on 5/1/2025), Disp: 30 g, Rfl: 1  No current facility-administered medications for this visit.    Facility-Administered Medications Ordered in Other Visits:     0.9%  NaCl infusion, , Intravenous, Continuous, Anthony Juan MD

## 2025-05-05 ENCOUNTER — HOSPITAL ENCOUNTER (OUTPATIENT)
Dept: RADIOLOGY | Facility: HOSPITAL | Age: 70
Discharge: HOME | End: 2025-05-05
Attending: PHYSICIAN ASSISTANT
Payer: COMMERCIAL

## 2025-05-05 DIAGNOSIS — M54.16 LUMBAR RADICULOPATHY, CHRONIC: ICD-10-CM

## 2025-05-05 PROCEDURE — 73721 MRI JNT OF LWR EXTRE W/O DYE: CPT | Mod: 26,RT,, | Performed by: RADIOLOGY

## 2025-05-05 PROCEDURE — 72148 MRI LUMBAR SPINE W/O DYE: CPT | Mod: 26,,, | Performed by: RADIOLOGY

## 2025-05-05 PROCEDURE — 72148 MRI LUMBAR SPINE W/O DYE: CPT | Mod: TC

## 2025-05-05 PROCEDURE — 73721 MRI JNT OF LWR EXTRE W/O DYE: CPT | Mod: TC,RT

## 2025-05-07 ENCOUNTER — ANESTHESIA EVENT (OUTPATIENT)
Dept: SURGERY | Facility: HOSPITAL | Age: 70
End: 2025-05-07
Payer: COMMERCIAL

## 2025-05-08 ENCOUNTER — TELEPHONE (OUTPATIENT)
Dept: SPORTS MEDICINE | Facility: CLINIC | Age: 70
End: 2025-05-08
Payer: COMMERCIAL

## 2025-05-08 NOTE — TELEPHONE ENCOUNTER
Spoke c pt. Informed pt of 0500 arrival time for 05/09/25 surgery at the [facility]. Reminded pt of NPO status. Pt expressed understanding & was thankful.

## 2025-05-09 ENCOUNTER — HOSPITAL ENCOUNTER (OUTPATIENT)
Facility: HOSPITAL | Age: 70
Discharge: HOME | End: 2025-05-09
Attending: ORTHOPAEDIC SURGERY | Admitting: ORTHOPAEDIC SURGERY
Payer: COMMERCIAL

## 2025-05-09 ENCOUNTER — ANESTHESIA (OUTPATIENT)
Dept: SURGERY | Facility: HOSPITAL | Age: 70
End: 2025-05-09
Payer: COMMERCIAL

## 2025-05-09 VITALS
DIASTOLIC BLOOD PRESSURE: 71 MMHG | RESPIRATION RATE: 13 BRPM | HEART RATE: 55 BPM | HEIGHT: 70 IN | WEIGHT: 187 LBS | TEMPERATURE: 98 F | BODY MASS INDEX: 26.77 KG/M2 | OXYGEN SATURATION: 98 % | SYSTOLIC BLOOD PRESSURE: 144 MMHG

## 2025-05-09 DIAGNOSIS — M75.112 NONTRAUMATIC INCOMPLETE TEAR OF LEFT ROTATOR CUFF: ICD-10-CM

## 2025-05-09 PROCEDURE — 63600175 PHARM REV CODE 636 W HCPCS: Performed by: NURSE ANESTHETIST, CERTIFIED REGISTERED

## 2025-05-09 PROCEDURE — 25000003 PHARM REV CODE 250: Performed by: ANESTHESIOLOGY

## 2025-05-09 PROCEDURE — 29827 SHO ARTHRS SRG RT8TR CUF RPR: CPT | Mod: LT,,, | Performed by: ORTHOPAEDIC SURGERY

## 2025-05-09 PROCEDURE — 71000016 HC POSTOP RECOV ADDL HR: Performed by: ORTHOPAEDIC SURGERY

## 2025-05-09 PROCEDURE — 29823 SHO ARTHRS SRG XTNSV DBRDMT: CPT | Mod: 51,LT,, | Performed by: ORTHOPAEDIC SURGERY

## 2025-05-09 PROCEDURE — 29824 SHO ARTHRS SRG DSTL CLAVICLC: CPT | Mod: 51,LT,, | Performed by: ORTHOPAEDIC SURGERY

## 2025-05-09 PROCEDURE — 64416 NJX AA&/STRD BRCH PL NFS IMG: CPT | Performed by: ANESTHESIOLOGY

## 2025-05-09 PROCEDURE — 27201423 OPTIME MED/SURG SUP & DEVICES STERILE SUPPLY: Performed by: ORTHOPAEDIC SURGERY

## 2025-05-09 PROCEDURE — 25000003 PHARM REV CODE 250: Performed by: PHYSICIAN ASSISTANT

## 2025-05-09 PROCEDURE — 63600175 PHARM REV CODE 636 W HCPCS: Performed by: ANESTHESIOLOGY

## 2025-05-09 PROCEDURE — 99900035 HC TECH TIME PER 15 MIN (STAT)

## 2025-05-09 PROCEDURE — 63600175 PHARM REV CODE 636 W HCPCS: Performed by: PHYSICIAN ASSISTANT

## 2025-05-09 PROCEDURE — 25000003 PHARM REV CODE 250: Performed by: NURSE ANESTHETIST, CERTIFIED REGISTERED

## 2025-05-09 PROCEDURE — 71000033 HC RECOVERY, INTIAL HOUR: Performed by: ORTHOPAEDIC SURGERY

## 2025-05-09 PROCEDURE — 71000039 HC RECOVERY, EACH ADD'L HOUR: Performed by: ORTHOPAEDIC SURGERY

## 2025-05-09 PROCEDURE — C1713 ANCHOR/SCREW BN/BN,TIS/BN: HCPCS | Performed by: ORTHOPAEDIC SURGERY

## 2025-05-09 PROCEDURE — 37000008 HC ANESTHESIA 1ST 15 MINUTES: Performed by: ORTHOPAEDIC SURGERY

## 2025-05-09 PROCEDURE — 29828 SHO ARTHRS SRG BICP TENODSIS: CPT | Mod: 51,LT,, | Performed by: ORTHOPAEDIC SURGERY

## 2025-05-09 PROCEDURE — 29823 SHO ARTHRS SRG XTNSV DBRDMT: CPT | Mod: 82,LT,, | Performed by: ORTHOPAEDIC SURGERY

## 2025-05-09 PROCEDURE — 94761 N-INVAS EAR/PLS OXIMETRY MLT: CPT

## 2025-05-09 PROCEDURE — 37000009 HC ANESTHESIA EA ADD 15 MINS: Performed by: ORTHOPAEDIC SURGERY

## 2025-05-09 PROCEDURE — 71000015 HC POSTOP RECOV 1ST HR: Performed by: ORTHOPAEDIC SURGERY

## 2025-05-09 PROCEDURE — 36000710: Performed by: ORTHOPAEDIC SURGERY

## 2025-05-09 PROCEDURE — 63600175 PHARM REV CODE 636 W HCPCS: Performed by: ORTHOPAEDIC SURGERY

## 2025-05-09 PROCEDURE — 36000711: Performed by: ORTHOPAEDIC SURGERY

## 2025-05-09 PROCEDURE — 29826 SHO ARTHRS SRG DECOMPRESSION: CPT | Mod: LT,,, | Performed by: ORTHOPAEDIC SURGERY

## 2025-05-09 DEVICE — SWIVELOCK, SP BC KL 5.5MM
Type: IMPLANTABLE DEVICE | Site: SHOULDER | Status: FUNCTIONAL
Brand: ARTHREX®

## 2025-05-09 DEVICE — BIO-COMPOSITE CRKSCRW 5.5X14.9MM
Type: IMPLANTABLE DEVICE | Site: SHOULDER | Status: FUNCTIONAL
Brand: ARTHREX®

## 2025-05-09 RX ORDER — CEFAZOLIN 2 G/1
2 INJECTION, POWDER, FOR SOLUTION INTRAMUSCULAR; INTRAVENOUS
Status: DISCONTINUED | OUTPATIENT
Start: 2025-05-09 | End: 2025-05-09 | Stop reason: HOSPADM

## 2025-05-09 RX ORDER — FENTANYL CITRATE 50 UG/ML
100 INJECTION, SOLUTION INTRAMUSCULAR; INTRAVENOUS
Status: DISCONTINUED | OUTPATIENT
Start: 2025-05-09 | End: 2025-05-09 | Stop reason: HOSPADM

## 2025-05-09 RX ORDER — MIDAZOLAM HYDROCHLORIDE 1 MG/ML
1 INJECTION, SOLUTION INTRAMUSCULAR; INTRAVENOUS
Status: DISCONTINUED | OUTPATIENT
Start: 2025-05-09 | End: 2025-05-09 | Stop reason: HOSPADM

## 2025-05-09 RX ORDER — DEXAMETHASONE SODIUM PHOSPHATE 4 MG/ML
INJECTION, SOLUTION INTRA-ARTICULAR; INTRALESIONAL; INTRAMUSCULAR; INTRAVENOUS; SOFT TISSUE
Status: DISCONTINUED | OUTPATIENT
Start: 2025-05-09 | End: 2025-05-09

## 2025-05-09 RX ORDER — KETAMINE HCL IN 0.9 % NACL 50 MG/5 ML
SYRINGE (ML) INTRAVENOUS
Status: DISCONTINUED | OUTPATIENT
Start: 2025-05-09 | End: 2025-05-09

## 2025-05-09 RX ORDER — ROPIVACAINE HYDROCHLORIDE 5 MG/ML
INJECTION, SOLUTION EPIDURAL; INFILTRATION; PERINEURAL
Status: DISCONTINUED | OUTPATIENT
Start: 2025-05-09 | End: 2025-05-09

## 2025-05-09 RX ORDER — METHOCARBAMOL 500 MG/1
1000 TABLET, FILM COATED ORAL ONCE AS NEEDED
Status: COMPLETED | OUTPATIENT
Start: 2025-05-09 | End: 2025-05-09

## 2025-05-09 RX ORDER — ONDANSETRON HYDROCHLORIDE 2 MG/ML
4 INJECTION, SOLUTION INTRAVENOUS ONCE AS NEEDED
Status: DISCONTINUED | OUTPATIENT
Start: 2025-05-09 | End: 2025-05-09 | Stop reason: HOSPADM

## 2025-05-09 RX ORDER — ROPIVACAINE HYDROCHLORIDE 2 MG/ML
INJECTION, SOLUTION EPIDURAL; INFILTRATION; PERINEURAL CONTINUOUS
Status: DISCONTINUED | OUTPATIENT
Start: 2025-05-09 | End: 2025-05-09 | Stop reason: HOSPADM

## 2025-05-09 RX ORDER — EPINEPHRINE 1 MG/ML
INJECTION, SOLUTION, CONCENTRATE INTRAVENOUS
Status: DISCONTINUED | OUTPATIENT
Start: 2025-05-09 | End: 2025-05-09 | Stop reason: HOSPADM

## 2025-05-09 RX ORDER — ACETAMINOPHEN 500 MG
500 TABLET ORAL EVERY 6 HOURS PRN
COMMUNITY

## 2025-05-09 RX ORDER — FAMOTIDINE 10 MG/ML
INJECTION, SOLUTION INTRAVENOUS
Status: DISCONTINUED | OUTPATIENT
Start: 2025-05-09 | End: 2025-05-09

## 2025-05-09 RX ORDER — PROPOFOL 10 MG/ML
VIAL (ML) INTRAVENOUS CONTINUOUS PRN
Status: DISCONTINUED | OUTPATIENT
Start: 2025-05-09 | End: 2025-05-09

## 2025-05-09 RX ORDER — OXYCODONE HYDROCHLORIDE 5 MG/1
5 TABLET ORAL EVERY 4 HOURS PRN
Status: DISCONTINUED | OUTPATIENT
Start: 2025-05-09 | End: 2025-05-09 | Stop reason: HOSPADM

## 2025-05-09 RX ORDER — SODIUM CHLORIDE 0.9 % (FLUSH) 0.9 %
3 SYRINGE (ML) INJECTION
Status: DISCONTINUED | OUTPATIENT
Start: 2025-05-09 | End: 2025-05-09 | Stop reason: HOSPADM

## 2025-05-09 RX ORDER — NEOSTIGMINE METHYLSULFATE 0.5 MG/ML
INJECTION INTRAVENOUS
Status: DISCONTINUED | OUTPATIENT
Start: 2025-05-09 | End: 2025-05-09

## 2025-05-09 RX ORDER — CELECOXIB 200 MG/1
400 CAPSULE ORAL
Status: COMPLETED | OUTPATIENT
Start: 2025-05-09 | End: 2025-05-09

## 2025-05-09 RX ORDER — SODIUM CHLORIDE 9 MG/ML
INJECTION, SOLUTION INTRAVENOUS CONTINUOUS
Status: DISCONTINUED | OUTPATIENT
Start: 2025-05-09 | End: 2025-05-09 | Stop reason: HOSPADM

## 2025-05-09 RX ORDER — ONDANSETRON HYDROCHLORIDE 2 MG/ML
INJECTION, SOLUTION INTRAVENOUS
Status: DISCONTINUED | OUTPATIENT
Start: 2025-05-09 | End: 2025-05-09

## 2025-05-09 RX ORDER — ROCURONIUM BROMIDE 10 MG/ML
INJECTION, SOLUTION INTRAVENOUS
Status: DISCONTINUED | OUTPATIENT
Start: 2025-05-09 | End: 2025-05-09

## 2025-05-09 RX ORDER — DEXMEDETOMIDINE HYDROCHLORIDE 100 UG/ML
INJECTION, SOLUTION INTRAVENOUS
Status: DISCONTINUED | OUTPATIENT
Start: 2025-05-09 | End: 2025-05-09

## 2025-05-09 RX ORDER — LIDOCAINE HYDROCHLORIDE 20 MG/ML
INJECTION INTRAVENOUS
Status: DISCONTINUED | OUTPATIENT
Start: 2025-05-09 | End: 2025-05-09

## 2025-05-09 RX ORDER — ACETAMINOPHEN 500 MG
1000 TABLET ORAL
Status: COMPLETED | OUTPATIENT
Start: 2025-05-09 | End: 2025-05-09

## 2025-05-09 RX ORDER — PROPOFOL 10 MG/ML
VIAL (ML) INTRAVENOUS
Status: DISCONTINUED | OUTPATIENT
Start: 2025-05-09 | End: 2025-05-09

## 2025-05-09 RX ADMIN — Medication 30 MG: at 07:05

## 2025-05-09 RX ADMIN — PROPOFOL 160 MG: 10 INJECTION, EMULSION INTRAVENOUS at 07:05

## 2025-05-09 RX ADMIN — DEXAMETHASONE SODIUM PHOSPHATE 8 MG: 4 INJECTION, SOLUTION INTRAMUSCULAR; INTRAVENOUS at 07:05

## 2025-05-09 RX ADMIN — NEOSTIGMINE METHYLSULFATE 5 MG: 0.5 INJECTION INTRAVENOUS at 08:05

## 2025-05-09 RX ADMIN — Medication: at 09:05

## 2025-05-09 RX ADMIN — FAMOTIDINE 20 MG: 10 INJECTION, SOLUTION INTRAVENOUS at 07:05

## 2025-05-09 RX ADMIN — CELECOXIB 400 MG: 200 CAPSULE ORAL at 05:05

## 2025-05-09 RX ADMIN — CEFAZOLIN 2 G: 2 INJECTION, POWDER, FOR SOLUTION INTRAMUSCULAR; INTRAVENOUS at 07:05

## 2025-05-09 RX ADMIN — GLYCOPYRROLATE 0.6 MG: 0.2 INJECTION, SOLUTION INTRAMUSCULAR; INTRAVENOUS at 08:05

## 2025-05-09 RX ADMIN — ACETAMINOPHEN 1000 MG: 500 TABLET ORAL at 05:05

## 2025-05-09 RX ADMIN — DEXMEDETOMIDINE 12 MCG: 100 INJECTION, SOLUTION, CONCENTRATE INTRAVENOUS at 07:05

## 2025-05-09 RX ADMIN — LIDOCAINE HYDROCHLORIDE 100 MG: 20 INJECTION INTRAVENOUS at 07:05

## 2025-05-09 RX ADMIN — Medication 10 MG: at 08:05

## 2025-05-09 RX ADMIN — METHOCARBAMOL 1000 MG: 500 TABLET ORAL at 10:05

## 2025-05-09 RX ADMIN — ROCURONIUM BROMIDE 50 MG: 10 INJECTION, SOLUTION INTRAVENOUS at 07:05

## 2025-05-09 RX ADMIN — SODIUM CHLORIDE: 9 INJECTION, SOLUTION INTRAVENOUS at 05:05

## 2025-05-09 RX ADMIN — PROPOFOL 40 MG: 10 INJECTION, EMULSION INTRAVENOUS at 08:05

## 2025-05-09 RX ADMIN — FENTANYL CITRATE 100 MCG: 50 INJECTION INTRAMUSCULAR; INTRAVENOUS at 06:05

## 2025-05-09 RX ADMIN — PROPOFOL 200 MCG/KG/MIN: 10 INJECTION, EMULSION INTRAVENOUS at 07:05

## 2025-05-09 RX ADMIN — ONDANSETRON 4 MG: 2 INJECTION INTRAMUSCULAR; INTRAVENOUS at 08:05

## 2025-05-09 RX ADMIN — ROPIVACAINE HYDROCHLORIDE 10 ML: 5 INJECTION EPIDURAL; INFILTRATION; PERINEURAL at 06:05

## 2025-05-09 RX ADMIN — PROPOFOL 50 MG: 10 INJECTION, EMULSION INTRAVENOUS at 08:05

## 2025-05-09 RX ADMIN — DEXMEDETOMIDINE 4 MCG: 100 INJECTION, SOLUTION, CONCENTRATE INTRAVENOUS at 08:05

## 2025-05-09 RX ADMIN — SODIUM CHLORIDE, SODIUM GLUCONATE, SODIUM ACETATE, POTASSIUM CHLORIDE, MAGNESIUM CHLORIDE, SODIUM PHOSPHATE, DIBASIC, AND POTASSIUM PHOSPHATE: .53; .5; .37; .037; .03; .012; .00082 INJECTION, SOLUTION INTRAVENOUS at 07:05

## 2025-05-09 RX ADMIN — MIDAZOLAM 2 MG: 1 INJECTION INTRAMUSCULAR; INTRAVENOUS at 06:05

## 2025-05-09 RX ADMIN — OXYCODONE 5 MG: 5 TABLET ORAL at 10:05

## 2025-05-09 NOTE — ANESTHESIA PROCEDURE NOTES
Intubation    Date/Time: 5/9/2025 7:08 AM    Performed by: Shekhar Brown CRNA  Authorized by: Ranjan Lake MD    Intubation:     Induction:  Intravenous    Intubated:  Postinduction    Mask Ventilation:  Easy mask    Attempts:  1    Attempted By:  CRNA    Method of Intubation:  Video laryngoscopy    Blade:  Chacko 3    Laryngeal View Grade: Grade I - full view of cords      Difficult Airway Encountered?: No      Complications:  None    Airway Device:  Oral endotracheal tube    Airway Device Size:  7.5    Style/Cuff Inflation:  Cuffed    Inflation Amount (mL):  5    Tube secured:  21    Secured at:  The lips    Placement Verified By:  Capnometry    Complicating Factors:  None    Findings Post-Intubation:  BS equal bilateral and atraumatic/condition of teeth unchanged

## 2025-05-09 NOTE — OPERATIVE NOTE ADDENDUM
Certification of Assistant at Surgery       Surgery Date: 5/9/2025     Participating Surgeons:  Surgeons and Role:     * LAUREN Chambers MD - Primary     * Shirin Pack -  Fellow       Procedures:  Procedure(s) (LRB):  ARTHROSCOPY, SHOULDER, W/ ROTATOR CUFF REPAIR (Left)  ARTHROSCOPY,SHOULDER,WITH BICEPS TENODESIS (Left)  ARTHROSCOPY, SHOULDER, WITH DISTAL CLAVICLE EXCISION (Left)    Assistant Surgeon's Certification of Necessity:  I understand that section 1842 (b) (6) (d) of the Social Security Act generally prohibits Medicare Part B reasonable charge payment for the services of assistants at surgery in teaching hospitals when qualified residents are available to furnish such services. I certify that the services for which payment is claimed were medically necessary, and that no qualified resident was available to perform the services. I further understand that these services are subject to post-payment review by the Medicare carrier.      Shirin Pack MD    05/09/2025  8:52 AM

## 2025-05-09 NOTE — ANESTHESIA PREPROCEDURE EVALUATION
05/09/2025  Pre-operative evaluation for Procedure(s) (LRB):  ARTHROSCOPY, SHOULDER, W/ ROTATOR CUFF REPAIR (Left)  ARTHROSCOPY,SHOULDER,WITH BICEPS TENODESIS (Left)  ARTHROSCOPY, SHOULDER, WITH DISTAL CLAVICLE EXCISION (Left)    Juan Luis Mcgrath is a 69 y.o. male     Problem List[1]    Review of patient's allergies indicates:  No Known Allergies    Medications Ordered Prior to Encounter[2]    Past Surgical History:   Procedure Laterality Date    ANTERIOR CRUCIATE LIGAMENT REPAIR      APPENDECTOMY      FINGER AMPUTATION Right 9/23/2019    Procedure: AMPUTATION, FINGER;  Surgeon: Luna Tirado MD;  Location: [facility] OR;  Service: Orthopedics;  Laterality: Right;  regional MAC    FINGER MASS EXCISION Right 9/9/2019    Procedure: EXCISION, MASS, FINGER small finger resection;  Surgeon: Luna Tirado MD;  Location: [facility] OR;  Service: Orthopedics;  Laterality: Right;  regional MAC    INJECTION OF JOINT Right 7/22/2020    Procedure: INJECTION RIGHT SI JOINT AND RIGHT PIRIFORMIS INJECTION;  Surgeon: Roosevelt Reddy MD;  Location: [facility] PAIN MGT;  Service: Pain Management;  Laterality: Right;  NEEDS CONSENT, URGENT    INJECTION OF JOINT Right 4/7/2021    Procedure: INJECTION, JOINT, SI;  Surgeon: Roosevelt Reddy MD;  Location: [facility] PAIN MGT;  Service: Pain Management;  Laterality: Right;    PROSTATECTOMY      radical    REMOVAL OF NAIL OF DIGIT Right 8/19/2019    Procedure: REMOVAL, NAIL, DIGIT right small finger;  Surgeon: Luna Tirado MD;  Location: [facility] OR;  Service: Orthopedics;  Laterality: Right;    TRANSFORAMINAL EPIDURAL INJECTION OF STEROID Right 10/4/2023    Procedure: INJECTION, STEROID, EPIDURAL, TRANSFORAMINAL APPROACH, RIGHT L4/L5 AND L5/S1;  Surgeon: Roosevelt Reddy MD;  Location: [facility] PAIN MGT;  Service: Pain Management;  Laterality: Right;       Social  History[3]      EKG:  Sinus bradycardia   Otherwise normal ECG   No previous ECGs available   Confirmed by José Giles ([id number]) on 4/10/2025 2:21:06 PM     2D Echo:  No results found for this or any previous visit.        Pre-op Assessment    I have reviewed the Patient Summary Reports.     I have reviewed the Nursing Notes. I have reviewed the NPO Status.   I have reviewed the Medications.     Review of Systems  Anesthesia Hx:             Denies Family Hx of Anesthesia complications.    Denies Personal Hx of Anesthesia complications.                    Cardiovascular:  Exercise tolerance: good        Denies Dysrhythmias.   Denies Angina.       Denies KEARNEY.                              Pulmonary:    Denies COPD.                     Renal/:   Denies Chronic Renal Disease.                Hepatic/GI:      Denies GERD.                Neurological:    Denies CVA.    Denies Seizures.                                Endocrine:  Denies Diabetes.               Physical Exam  General: Well nourished, Cooperative, Alert and Oriented    Airway:  Mallampati: II / I  Mouth Opening: Normal  TM Distance: Normal  Tongue: Normal  Neck ROM: Normal ROM    Dental:  Intact    Chest/Lungs:  Clear to auscultation, Normal Respiratory Rate    Heart:  Rate: Normal  Rhythm: Regular Rhythm        Anesthesia Plan  Type of Anesthesia, risks & benefits discussed:    Anesthesia Type: Regional, Gen ETT  Intra-op Monitoring Plan: Standard ASA Monitors  Post Op Pain Control Plan: multimodal analgesia and peripheral nerve block  Induction:  IV  Informed Consent: Informed consent signed with the Patient and all parties understand the risks and agree with anesthesia plan.  All questions answered. Patient consented to blood products? Yes  ASA Score: 2  Day of Surgery Review of History & Physical: H&P Update referred to the surgeon/provider.    Ready For Surgery From Anesthesia Perspective.     .           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      celecoxib (CELEBREX) 200 MG capsule Take 1 capsule (200 mg total) by mouth once daily. Take once per day 40 capsule 1   [3]   Social History  Socioeconomic History    Marital status:    Occupational History    Occupation: anesthesiologist   Tobacco Use    Smoking status: Never    Smokeless tobacco: Never   Substance and Sexual Activity    Alcohol use: Yes     Comment: social    Drug use: No    Sexual activity: Yes     Partners: Female   Social History Narrative    Moved from [place] 3 weeks ago. Lives with wife.     Social Drivers of Health     Financial Resource Strain: Low Risk  (8/16/2024)    Overall Financial Resource Strain (CARDIA)     Difficulty of Paying Living Expenses: Not hard at all   Food Insecurity: No Food Insecurity (8/16/2024)    Hunger Vital Sign     Worried About Running Out of Food in the Last Year: Never true     Ran Out of Food in the Last Year: Never true   Transportation Needs: No Transportation Needs (9/5/2023)    PRAPARE - Transportation     Lack of Transportation (Medical): No     Lack of Transportation (Non-Medical): No   Physical Activity: Sufficiently Active (8/16/2024)    Exercise Vital Sign     Days of Exercise per Week: 5 days     Minutes of Exercise per Session: 60 min   Stress: No Stress Concern Present  (8/16/2024)    [employer] - Occupational Stress Questionnaire     Feeling of Stress : Only a little   Housing Stability: Unknown (9/5/2023)    Housing Stability Vital Sign     Unable to Pay for Housing in the Last Year: No     Unstable Housing in the Last Year: Patient refused

## 2025-05-09 NOTE — TRANSFER OF CARE
"Anesthesia Transfer of Care Note    Patient: Juan Luis Mcgrath    Procedure(s) Performed: Procedure(s) (LRB):  ARTHROSCOPY, SHOULDER, W/ ROTATOR CUFF REPAIR (Left)  ARTHROSCOPY,SHOULDER,WITH BICEPS TENODESIS (Left)  ARTHROSCOPY, SHOULDER, WITH DISTAL CLAVICLE EXCISION (Left)    Patient location: PACU    Anesthesia Type: general    Transport from OR: Transported from OR on 6-10 L/min O2 by face mask with adequate spontaneous ventilation    Post pain: adequate analgesia    Post assessment: no apparent anesthetic complications and tolerated procedure well    Post vital signs: stable    Level of consciousness: awake, alert and oriented    Nausea/Vomiting: no nausea/vomiting    Complications: none    Transfer of care protocol was followed      Last vitals: Visit Vitals  BP (!) 157/92 (BP Location: Right arm, Patient Position: Lying)   Pulse 75   Temp 36.5 °C (97.7 °F) (Temporal)   Resp 16   Ht 5' 10" (1.778 m)   Wt 84.8 kg (187 lb)   SpO2 95%   BMI 26.83 kg/m²     "

## 2025-05-09 NOTE — ANESTHESIA POSTPROCEDURE EVALUATION
Anesthesia Post Evaluation    Patient: Juan Luis Mcgrath    Procedure(s) Performed: Procedure(s) (LRB):  ARTHROSCOPY, SHOULDER, W/ ROTATOR CUFF REPAIR (Left)  ARTHROSCOPY,SHOULDER,WITH BICEPS TENODESIS (Left)  ARTHROSCOPY, SHOULDER, WITH DISTAL CLAVICLE EXCISION (Left)    Final Anesthesia Type: general      Patient location during evaluation: PACU  Patient participation: Yes- Able to Participate  Level of consciousness: awake and alert and oriented  Post-procedure vital signs: reviewed and stable  Pain management: adequate  Airway patency: patent    PONV status at discharge: No PONV  Anesthetic complications: no      Cardiovascular status: blood pressure returned to baseline and hemodynamically stable  Respiratory status: unassisted, room air and spontaneous ventilation  Hydration status: euvolemic  Follow-up not needed.              Vitals Value Taken Time   /81 05/09/25 10:02   Temp 36.5 °C (97.7 °F) 05/09/25 09:07   Pulse 75 05/09/25 10:02   Resp 15 05/09/25 10:02   SpO2 96 % 05/09/25 10:02   Vitals shown include unfiled device data.      No case tracking events are documented in the log.      Pain/Naa Score: Pain Rating Prior to Med Admin: 0 (5/9/2025  9:17 AM)  Pain Rating Post Med Admin: 3 (5/9/2025  6:40 AM)  Ana Score: 8 (5/9/2025  9:45 AM)

## 2025-05-09 NOTE — OP NOTE
[facility]   OPERATIVE REPORT   ORTHOPAEDIC SURGERY   PROVIDER: DR. JESUS VASQUEZ    PATIENT INFORMATION   Juan Luis Mcgrath 69 y.o. male 1955   MRN: [id number]   LOCATION: [facility]     DATE OF PROCEDURE: 5/9/2025     PREOPERATIVE DIAGNOSES:   Left  1. Shoulder rotator cuff tear, high-grade partial undersurface supraspinatus  2. Shoulder SLAP tear with biceps tendinopathy  3. Shoulder chondromalacia  4. Shoulder chondral loose body  5. Shoulder AC joint arthritis    POSTOPERATIVE DIAGNOSES:   Left  1. Shoulder rotator cuff tear, high-grade partial undersurface supraspinatus  2. Shoulder SLAP tear with biceps tendinopathy  3. Shoulder chondromalacia  4. Shoulder chondral loose body  5. Shoulder AC joint arthritis  6. Shoulder synovitis  7. Shoulder degenerative labral tearing     OPERATION:   Left  1. Shoulder arthroscopic rotator cuff repair (CPT 90096)  2. Shoulder arthroscopic biceps tenodesis (CPT 52496)  3. Shoulder arthroscopic extensive debridement (CPT 70539)    4. Shoulder arthroscopic distal clavicle excision (CPT 48334)  5. Shoulder arthroscopic loose body removal (CPT 85035)    6. Shoulder arthroscopic subacromial decompression    SURGEON: JESUS Vasquez MD     ASSISTANTS:  Shirin Pack MD - Fellow - First Assist  CAMELIA Squires     First Assistant Duties: Due to the complexity of the case and the need for significant intra-operative decision making, first assistant duties were medically necessary. The chronicity of the disease process and the level of deformity dictated that first assistant duties be undertaken. Assistance was provided for joint exposure, manipulation, and retractor placement. There was no qualified resident available for the procedure.     ANESTHESIA: General with interscalene catheter     ESTIMATED BLOOD LOSS: Minimal    IMPLANTS:   Implant Name Type Inv. Item Serial No.  Lot No. LRB No. Used Action   ANCHOR BIOCOMP W/3 SUTURES -  [id number]  ANCHOR BIOCOMP W/3 SUTURES  [employer] [id number] Left 1 Implanted   ANCHOR SWIVELOCK KL 5.5X24.5MM - [id number]  ANCHOR SWIVELOCK KL 5.5X24.5MM  [employer] [id number] Left 1 Implanted   ANCHOR SWIVELOCK KL 5.5X24.5MM - [id number]  ANCHOR SWIVELOCK KL 5.5X24.5MM  [employer] [id number] Left 1 Implanted      SPECIMENS: None.    COMPLICATIONS: None.     INTRAOPERATIVE COUNTS: Correct.     PROPHYLACTIC IV ANTIBIOTICS: Given per [facility] Protocol.    INDICATIONS FOR PROCEDURE:   Juan Luis Mcgrath 69 y.o. male  has been seen and evaluated in the office for continued left shoulder pain and mechanical symptoms.  After a lengthy discussion and failed nonoperative management, the patient wished to proceed with surgical intervention and was fully informed of risks and benefits.    DETAILS OF PROCEDURE:  After the correct operative site was marked by the operating surgeon, an interscalene block was administered by the anesthesia team.  The patient was then taken to the operating room and placed supine on the operating room table, where the patient underwent general anesthesia by the anesthesia team.  The patient was then rolled into the lateral decubitus position with the operative side up.  A well-padded axillary roll, beanbag and pillows were placed. All pressure points were carefully padded and checked. The upper extremities and both lower extremities were placed in comfortable positions and were also well-padded.      A verbal timeout was confirmed to identify the patient, operative site and planned operative procedure. It was also confirmed the patient had received preoperative IV antibiotic per protocol.     Examination under anesthesia demonstrated: Forward elevation 180 degrees, external rotation with arm to side 60 degrees.    The operative upper extremity was then prepped and draped in the usual sterile fashion.     The Spider arm positioner was implemented with balanced suspension and appropriate landmarks were noted on  the skin.  A posterior followed by jonathan-superior portals were created and systematic examination of the joint revealed the following:      -Biceps tendinitis at the root attachment with SLAP tearing and disruption of the long head tendon attachment  -Diffuse synovitis from anterior to posterior with degenerative labral tearing  -Thickened and inflamed capsular tissue over the anterior rotator interval extending distally through the MGHL  -A posterior push-pull maneuver with probing was performed demonstrating an intact subscapularis.  There was some irritation and inflammation over the upper border subscapularis from the adjacent biceps tendon  -The undersurface of the superior cuff was hemorrhagic with signs of inflammation.  No significant tearing seen on the articular side.  -Multiple chondral loose bodies were present  -Approximate 12 x 15 mm area of grade 4 chondromalacia of the anterior central humeral head with delaminated articular cartilage.  Minimal grade 2 changes of the glenoid.    A shaver was again brought in to clear the field of view and to debride torn labrum and undersurface cuff from anterior to posterior. Extensive synovitis was also removed. Cautery was used to resect the interval to the coracoid and to release capsule through the MGHL. All thickened capsular tissue was released adjacent to the glenoid labrum. Care was taken to protect the axillary nerve during this portion of the procedure.  The arthroscopic shaver was utilized to remove multiple chondral loose bodies.  Additionally the shaver was utilized to perform chondroplasty of the glenoid.  Debridement was carried back over the edge of the chondral defect to stabilized delaminated articular cartilage back to a stable margin.    The biceps was tagged with a FiberLink suture for luggage tag configuration x 2 and released with curved Sanderson scissors.     Attention was then turned to the subacromial space where significant hypertrophic bursa  was encountered. Through an anterolateral working portal, shaver and cautery devices were introduced to clear the subacromial space of bursa and adhesions. Bursal reflections to the deltoid fascia anteriorly and posteriorly were taken down to further expand this space. This created a nice room with a view. The undersurface of the acromion was exposed with cautery to delineate bony anatomy. Systematic examination of this space revealed the following:    -Subacromial spurring with narrowing of the anterolateral subacromial interval  -Degenerative changes with spurring of the distal clavicle, medial acromial facet articulation  -Fraying and degeneration of the CA ligament indicative of chronic outlet impingement  -High grade partial to near complete tear of the supraspinatus tendon.    The tear easily completed with a shaver.  She had quality was good.  The tuberosity was prepared with the shaver and power rasp through accessory posterolateral and posterior portals while looking from the standard anterolateral portal.  The tear was then repaired with Fiberwire suture originating from a single triple loaded Corkscrew medial row anchor with sutures passed in horizontal mattress fashion across the tear. The medial row was then tied for a total of 3 knots.  A FiberLink suture was placed over the posterior margin of the cuff tear for luggage tag pull down.  That is suture along with the cuff anchor sutures were then taken laterally to two lateral row Swivelock anchors to complete the transosseous equivalent double row repair configuration. The cuff was repaired to its native position on the greater tuberosity without excessive tension. Following repair, probing of the repair site revealed good tissue apposition to the footprint and good construct stability.  The FiberLink suture used to tag the biceps of the beginning of the procedure was incorporated into the anterolateral row anchor for arthroscopic biceps tenodesis in  this case.    Subacromial decompression was completed using posterior cutting block technique in the standard fashion with a 4.5 mm ladonna without difficulty.  The anterior osteophyte was flattened converting the acromion morphology from a type 3 to a type 1. Confirmation of adequate resection was confirmed while viewing from the lateral portal and referencing from the posterior acromial undersurface.    Next, attention was then turned to the distal clavicle excision.  A thermal device was used to clear the soft tissue off the end of the AC joint. The anterior portal was used to perform the AC resection with a ladonna while viewing from the anterolateral portal.  The adequacy of the resection was confirmed while viewing from the anterior portal.  Care was taken to resect enough postero-superiorly.  Approximately 10 mm was resected from the distal clavicle.     The shoulder and subacromial space were then irrigated and fluid was extravasated using suction.     All portals were reapproximated using 3-0 Nylon. Xeroform and absorbant mepilex pads were placed to cover all incisions.  A polar care shoulder sleeve was secured followed by a sling with abduction pillow. The patient was then repositioned supine, extubated and taken to the recovery room where the patient arrived in stable condition with the compartments of the arm and forearm soft and good perfusion in all digits.     POSTOPERATIVE PLAN OF CARE:  -Patient will be discharged home according to protocol.  -Physical Therapy: Follow the < 3 cm cuff repair rehab protocol. PROM starts in 1-2 weeks. AROM starts after 6 weeks. No cuff resistive activity x 12 weeks. No biceps resistive activity x 8 weeks. Sling and pillow immobilization for 6 weeks.  -DVT prophylaxis: ASA 81 mg twice a day x 2 weeks.

## 2025-05-09 NOTE — ANESTHESIA PROCEDURE NOTES
Interscalene Continuous Nerve Catheter    Patient location during procedure: pre-op   Block not for primary anesthetic.  Reason for block: at surgeon's request and post-op pain management   Post-op Pain Location: L shoulder pain   Start time: 5/9/2025 6:40 AM  Timeout: 5/9/2025 6:40 AM   End time: 5/9/2025 6:45 AM    Staffing  Authorizing Provider: Ranjan Lake MD  Performing Provider: Ranjan Lake MD    Staffing  Performed by: Ranjan Lake MD  Authorized by: Ranjan Lake MD    Preanesthetic Checklist  Completed: patient identified, IV checked, site marked, risks and benefits discussed, surgical consent, monitors and equipment checked, pre-op evaluation and timeout performed  Peripheral Block  Patient position: sitting  Prep: ChloraPrep and site prepped and draped  Patient monitoring: heart rate, cardiac monitor, continuous pulse ox, continuous capnometry and frequent blood pressure checks  Block type: interscalene  Laterality: left  Injection technique: continuous  Needle  Needle type: Tuohy   Needle gauge: 18 G  Needle length: 2 in  Needle localization: anatomical landmarks and ultrasound guidance  Catheter type: non-stimulating  Catheter size: 20 G  Test dose: lidocaine 1.5% with Epi 1-to-200,000 and negative   -ultrasound image captured on disc.  Assessment  Injection assessment: negative aspiration, negative parasthesia and local visualized surrounding nerve  Paresthesia pain: none  Heart rate change: no  Slow fractionated injection: yes  Pain Tolerance: comfortable throughout block and no complaints  Medications:    Medications: ropivacaine (NAROPIN) injection 0.5% - Perineural   10 mL - 5/9/2025 6:45:00 AM    Additional Notes  VSS.  [facility] RN monitoring vitals throughout procedure.  Patient tolerated procedure well.

## 2025-05-09 NOTE — PLAN OF CARE
Patient is AAO and VSS.  Tolerating PO and states pain is tolerable.  Dressing CDI.  Patient states they are ready for d/c.  IV removed.  Catheter tip intact.  Spouse at bedside.  Discharge instructions reviewed and copy given to the patient and spouse.  Questions answered.  Both verbalized understanding.  Medication delivered to bedside. Patient wheeled to car by Kylie MORAES

## 2025-05-09 NOTE — BRIEF OP NOTE
[facility] - Surgery (Hospital)  Brief Operative Note    Surgery Date: 5/9/2025     Surgeons and Role:     * LAUREN Chambers MD - Primary    Assisting Surgeon: None    Pre-op Diagnosis:  Nontraumatic incomplete tear of left rotator cuff [M75.112]  Arthritis of left acromioclavicular joint [M19.012]  Biceps tendinitis of left shoulder [M75.22]    Post-op Diagnosis:  Post-Op Diagnosis Codes:     * Nontraumatic incomplete tear of left rotator cuff [M75.112]     * Arthritis of left acromioclavicular joint [M19.012]     * Biceps tendinitis of left shoulder [M75.22]    Procedure(s) (LRB):  ARTHROSCOPY, SHOULDER, W/ ROTATOR CUFF REPAIR (Left)  ARTHROSCOPY,SHOULDER,WITH BICEPS TENODESIS (Left)  ARTHROSCOPY, SHOULDER, WITH DISTAL CLAVICLE EXCISION (Left)    Anesthesia: General/Regional    Operative Findings: left rotator cuff tear    Estimated Blood Loss: * No values recorded between 5/9/2025  6:50 AM and 5/9/2025  8:53 AM *         Specimens:   Specimen (24h ago, onward)      None            * No specimens in log *        Discharge Note    OUTCOME: Patient tolerated treatment/procedure well without complication and is now ready for discharge.    DISPOSITION: Home or Self Care    FINAL DIAGNOSIS:  Left rotator cuff tear    FOLLOWUP: In clinic    DISCHARGE INSTRUCTIONS:    Discharge Procedure Orders   Diet Adult Regular     Lifting restrictions     Notify your health care provider if you experience any of the following:  temperature >100.4     Notify your health care provider if you experience any of the following:  persistent nausea and vomiting or diarrhea     Notify your health care provider if you experience any of the following:  severe uncontrolled pain     Notify your health care provider if you experience any of the following:  redness, tenderness, or signs of infection (pain, swelling, redness, odor or green/yellow discharge around incision site)

## 2025-05-09 NOTE — PLAN OF CARE
Pre op procedure complete. All questions answered. Pt lying in bed, call bell in reach. Pt's belongings in bag at bedside will leave in locker, glasses & cell phone with wife. Wife brought to bedside. Still needs site augustin, update, & anes consent

## 2025-05-12 ENCOUNTER — CLINICAL SUPPORT (OUTPATIENT)
Dept: REHABILITATION | Facility: HOSPITAL | Age: 70
End: 2025-05-12
Payer: COMMERCIAL

## 2025-05-12 DIAGNOSIS — M25.619 IMPAIRED RANGE OF MOTION OF SHOULDER, UNSPECIFIED LATERALITY: Primary | ICD-10-CM

## 2025-05-12 DIAGNOSIS — M25.512 ACUTE PAIN OF LEFT SHOULDER: ICD-10-CM

## 2025-05-12 PROCEDURE — 97161 PT EVAL LOW COMPLEX 20 MIN: CPT | Performed by: PHYSICAL THERAPIST

## 2025-05-12 PROCEDURE — 97140 MANUAL THERAPY 1/> REGIONS: CPT | Performed by: PHYSICAL THERAPIST

## 2025-05-12 NOTE — PROGRESS NOTES
Physical Therapy Evaluation    Patient Name: Juan Luis Mcgrath  MRN: [id number]  YOB: 1955  Encounter Date: 5/12/2025    Therapy Diagnosis:   Encounter Diagnoses   Name Primary?    Impaired range of motion of shoulder, unspecified laterality Yes    Acute pain of left shoulder      Physician: Primo Nassar, PT, DPT    Physician Orders: Eval and Treat  Medical Diagnosis: Shoulder pain    Visit # / Visits Authorized:  5 / 10  Insurance Authorization Period: 2/13/2025 to 12/31/2025  Date of Evaluation: 5/12/2025  Plan of Care Certification: 5/12/2025 to 2/12/2026     Time In: 0800   Time Out: 0900  Total Time (in minutes): 60   Total Billable Time (in minutes): 60    Intake Outcome Measure for FOTO Survey    Therapist reviewed FOTO scores for Juan Luis Mcgrath on 5/12/2025.   FOTO report - see Media section or FOTO account episode details.     Intake Score: 51%    Precautions:       Subjective   History of Present Illness  Juan Luis is a 69 y.o. male who reports to physical therapy with a chief concern of s/p L RCR.     The patient reports a medical diagnosis of M25.519 (ICD-10-CM) - Shoulder pain.  Patient reports a surgery of L RCR. Surgery occurred on 05/09/25. Diagnostic tests related to this condition: MRI studies.   MRI Studies Details: Impression:     1. Supraspinatus tendinosis with a medium-sized, high-grade partial-thickness interstitial/concealed footprint tear with suspected bursal surface extension.  No retraction or volume loss.  2. Subscapularis tendinosis with a partial-thickness intrasubstance/concealed footprint tear.  3. Infraspinatus tendinosis.  4. Degenerative fraying/tearing of the superior labrum.  5. Mild glenohumeral osteoarthritis with a 1.4 x 1.4 cm full-thickness chondral defect of the humeral head.  6. AC joint arthrosis and prominent subacromial spurring.  7. Joint effusion with synovitis.  8. 1.2 x 0.7 cm loose body at the subscapularis recess, likely osteocartilaginous  in nature.  9. Subacromial/subdeltoid bursitis.    History of Present Condition/Illness: Patient is a 69 y.o. male presenting today s/p L RCR on 5/9/2025. He notes the past two days have been two of the craziest days of his life since surgery. He has been trying to not take much pain medication. He does have some swelling into the L hand but that has been resolving along with minimal numbness and tingling. 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 He was driven to therapy today by his wife.     Activities of Daily Living  Social history was obtained from Patient.    General Prior Level of Function Comments: No difficulty with lifting, reaching, or carrying  General Current Level of Function Comments: Unable to complete any ADL with LUE           Pain     Patient reports a current pain level of 4/10. Pain at best is reported as 2/10. Pain at worst is reported as 7/10.   Location: L Shoulder  Clinical Progression (since onset): Stable  Pain Qualities: Aching, Sharp, Discomfort, Throbbing  Pain-Relieving Factors: Ice, Rest, Medications - prescription  Pain-Aggravating Factors: Holding objects, Reaching, Lifting         Employment  Patient reports: Does the patient's condition impact their ability to work?  Employment Status: Employed full-time   Pediatric Anaesthesia       Past Medical History/Physical Systems Review:   Juan Luis Mcgrath  has a past medical history of Degenerative joint disease (DJD) of lumbar spine, History of cold sores, History of skin cancer, Prostate cancer, Sacroiliitis, and Squamous cell carcinoma.    Juan Luis Mcgrath  has a past surgical history that includes Anterior cruciate ligament repair; Prostatectomy; Appendectomy; Removal of nail of digit (Right, 8/19/2019); Finger mass excision (Right, 9/9/2019); Finger amputation (Right, 9/23/2019); Injection of joint (Right, 7/22/2020); Injection of joint (Right, 4/7/2021); Transforaminal  epidural injection of steroid (Right, 10/4/2023); Arthroscopic repair of rotator cuff of shoulder (Left, 5/9/2025); arthroscopy,shoulder,with biceps tenodesis (Left, 5/9/2025); and Arthroscopy of shoulder with removal of distal clavicle (Left, 5/9/2025).    Juan Luis has a current medication list which includes the following prescription(s): acetaminophen, aspirin, celecoxib, hydroquinone, ondansetron, and oxycodone, and the following Facility-Administered Medications: 0.9% nacl.    Review of patient's allergies indicates:  No Known Allergies     Objective   Bracing  Patient presents with a Left shoulder brace. The shoulder brace type is Immobilizer with abduction.           Shoulder Observations     Patient presents to clinic with post operative bandaging in place and wearing a sling with abduction pillow. Mild swelling noted, as expected for this stage post op. No overt signs of infection.        Right Dermatomes  Right Cervical Dermatome Light Touch  Intact: C4, C5, C6, C7, and C8  Right Upper Thoracic Dermatome Light Touch  Intact: T1       Left Dermatomes  Left Cervical Dermatome Light Touch  Intact: C4, C5, C6, C7, and C8  Left Upper Thoracic Dermatome Light Touch  Intact: T1           Shoulder Range of Motion     Shoulder, Elbow, or Forearm Range of Motion Details: Formal shoulder ROM not taken secondary to post op precautions.     Elbow/Forearm Range of Motion   Right Elbow/Forearm   Active (deg) Passive (deg) Pain   Flexion         Extension -15 100     Forearm Pronation         Forearm Supination                       Shoulder Strength - Planes of Motion   Right Strength Right Pain Left Strength Left  Pain   Flexion           Extension           ABduction           ADduction           Horizontal ABduction           Horizontal ADduction           Internal Rotation 0°           Internal Rotation 90°           External Rotation 0°           External Rotation 90°               Shoulder Strength Details  Formal  shoulder strength measurements not taken secondary to post op precautions.                  Treatment:  Manual Therapy  MT 1: PROM elbow flexion/extension  MT 2: Sling refit      Time Entry(in minutes):  PT Evaluation (Low) Time Entry: 45  Manual Therapy Time Entry: 15    Assessment & Plan   Assessment  Juan Luis presents with a condition of Low complexity.   Presentation of Symptoms: Evolving  Will Comorbidities Impact Care: No       Functional Limitations: Activity tolerance, Completing self-care activities, Proprioception, Range of motion, Participating in leisure activities, Pain with ADLs/IADLs, Carrying objects, Disrupted sleep pattern, Pain when reaching, Reaching  Impairments: Pain with functional activity, Impaired physical strength  Personal Factors Affecting Prognosis: Pain    Patient Goal for Therapy (PT): return to work  Prognosis: Good  Assessment Details: Patient is a 69 y.o, male presenting s/p L RCR. He presents today with limited shoulder range of motion, strength deficits, pain with ADL, unable to drive himself, trouble sleeping at night, and currently out of work. He will benefit from PT to restore his above limitations, return to work without difficulty, and restore functional use of the LUE.     Plan  From a physical therapy perspective, the patient would benefit from: Skilled Rehab Services    Planned therapy interventions include: Therapeutic exercise, Therapeutic activities, Neuromuscular re-education, Manual therapy, ADLs/IADLs, and Other (Comment). Dry Needling (prn)  Planned modalities to include: Biofeedback, Electrical stimulation - attended, Electrical stimulation - passive/unattended, Thermotherapy (hot pack), and Cryotherapy (cold pack).        Visit Frequency: 2 times Per Week for 9 Months.       This plan was discussed with Patient.   Discussion participants: Agreed Upon Plan of Care             Patient's spiritual, cultural, and educational needs considered and patient agreeable to  plan of care and goals.     Education  Education was done with Patient. The patient's learning style includes Demonstration. The patient Demonstrates understanding and Verbalizes understanding.                 Goals:   Active       Goals       Pt will demonstrate independence with initial HEP to improve their performance of their Activities of Daily Living.          Start:  05/12/25    Expected End:  06/23/25            Pt will demonstrate shoulder ROM 0-90 deg flexion and 15 deg ER       Start:  05/12/25    Expected End:  06/23/25            Patient will improve their numeric pain rating score to no greater than 2/10 when performing ADL to improve performance of their Activities of Daily Living.         Start:  05/12/25    Expected End:  06/23/25            Pt will demonstrate independence with initial HEP to improve their performance of their Instrumental Activities of Daily Living.          Start:  05/12/25    Expected End:  09/15/25            Patient will improve their numeric pain rating score to no greater than 0/10 when performing work tasks to improve performance of their Instrumental Activities of Daily Living.         Start:  05/12/25    Expected End:  09/15/25            pt will demonstrate at least 95% LSI with HHD for flexion, abduction, ER, and IR for improved stability        Start:  05/12/25    Expected End:  09/15/25                Primo Nassar, PT, DPT

## 2025-05-16 DIAGNOSIS — M75.112 NONTRAUMATIC INCOMPLETE TEAR OF LEFT ROTATOR CUFF: Primary | ICD-10-CM

## 2025-05-16 RX ORDER — TRAMADOL HYDROCHLORIDE 50 MG/1
50 TABLET, FILM COATED ORAL EVERY 6 HOURS
Qty: 25 TABLET | Refills: 0 | Status: SHIPPED | OUTPATIENT
Start: 2025-05-16

## 2025-05-16 NOTE — PROGRESS NOTES
Called patient to discuss pain medication. He feels the Oxy is too strong. He is taking NSAIDS and tylenol but would like something for breakthrough pain. I will call in Tramadol to his pharmacy.

## 2025-05-21 ENCOUNTER — CLINICAL SUPPORT (OUTPATIENT)
Dept: REHABILITATION | Facility: HOSPITAL | Age: 70
End: 2025-05-21
Payer: COMMERCIAL

## 2025-05-21 DIAGNOSIS — M25.512 ACUTE PAIN OF LEFT SHOULDER: Primary | ICD-10-CM

## 2025-05-21 DIAGNOSIS — M25.619 IMPAIRED RANGE OF MOTION OF SHOULDER, UNSPECIFIED LATERALITY: ICD-10-CM

## 2025-05-21 PROCEDURE — 97140 MANUAL THERAPY 1/> REGIONS: CPT | Performed by: PHYSICAL THERAPIST

## 2025-05-21 PROCEDURE — 97112 NEUROMUSCULAR REEDUCATION: CPT | Performed by: PHYSICAL THERAPIST

## 2025-05-21 RX ORDER — CELECOXIB 200 MG/1
200 CAPSULE ORAL
Qty: 40 CAPSULE | Refills: 1 | Status: SHIPPED | OUTPATIENT
Start: 2025-05-21

## 2025-05-21 NOTE — PROGRESS NOTES
Outpatient Rehab    Physical Therapy Visit    Patient Name: Juan Luis Mcgrath  MRN: [id number]  YOB: 1955  Encounter Date: 5/21/2025    Therapy Diagnosis:   Encounter Diagnoses   Name Primary?    Acute pain of left shoulder Yes    Impaired range of motion of shoulder, unspecified laterality      Physician: Primo Nassar, PT, DPT    Physician Orders: Eval and Treat  Medical Diagnosis: Shoulder pain    Visit # / Visits Authorized:  6 / 10  Insurance Authorization Period: 2/13/2025 to 12/31/2025  Date of Evaluation: 5/12/2025  Plan of Care Certification: 5/12/2025 to 2/12/2026      PT/PTA:     Number of PTA visits since last PT visit:   Time In: 0702   Time Out: 0800  Total Time (in minutes): 58   Total Billable Time (in minutes): 58    FOTO:  Intake Score:  %  Survey Score 2:  %  Survey Score 3:  %    Precautions:  Left Upper Extremity Weight-Bearing Status: Non-weight-bearing  L RCR precautions      Subjective   He has been doing well in the sling, feels good. Surprised by the amount of swelling. Ready to get back to driving.  Pain reported as 2/10.      Objective      Shoulder Range of Motion  Left Shoulder   Active (deg) Passive (deg) Pain   Flexion   70     Extension         Scaption         ABduction         ADduction         Horizontal ABduction         Horizontal ADduction         External Rotation (Shoulder ABducted 0 degrees)   5     External Rotation (Shoulder ABducted 45 degrees)         External Rotation (Shoulder ABducted 90 degrees)         Internal Rotation (Shoulder ABducted 0 degrees)         Internal Rotation (Shoulder ABducted 45 degrees)         Internal Rotation (Shoulder ABducted 90 degrees)                          Treatment:  Manual Therapy  MT 1: Gr I-II oscillations pain relief and relaxation  MT 2: Gr II-III flexion 0-90 deg  MT 3: Gr II inferior mob  MT 4: ER 0-20 deg arm by side and propped up on towel  MT 5: Sling refit  Therapeutic Activity  TA 2: Wrist 4 way 1#  "20x  TA 3: AAROM elbow flexion/extension 40x  TA 4: Seated scap squeezes 40x 3" hold  TA 5: Seated shrugs 40x 3" hold  TA 6: Patient education - sling wear, HEP compliance, healing timelines, and rehab prognosis    Time Entry(in minutes):  Manual Therapy Time Entry: 30  Neuromuscular Re-Education Time Entry: 28    Assessment & Plan   Assessment: Passive motion was very good today, ER to 5-10 degrees. Flexion to 70 deg today, full elbow extension.  Evaluation/Treatment Tolerance: Patient tolerated treatment well    Patient will continue to benefit from skilled outpatient physical therapy to address the deficits listed in the problem list box on initial evaluation, provide pt/family education and to maximize pt's level of independence in the home and community environment.     Patient's spiritual, cultural, and educational needs considered and patient agreeable to plan of care and goals.           Plan: Progress per RCR protocol    Goals:   Active       Goals       Pt will demonstrate independence with initial HEP to improve their performance of their Activities of Daily Living.    (Progressing)       Start:  05/12/25    Expected End:  06/23/25            Pt will demonstrate shoulder ROM 0-90 deg flexion and 15 deg ER (Progressing)       Start:  05/12/25    Expected End:  06/23/25            Patient will improve their numeric pain rating score to no greater than 2/10 when performing ADL to improve performance of their Activities of Daily Living.   (Progressing)       Start:  05/12/25    Expected End:  06/23/25            Pt will demonstrate independence with initial HEP to improve their performance of their Instrumental Activities of Daily Living.    (Progressing)       Start:  05/12/25    Expected End:  09/15/25            Patient will improve their numeric pain rating score to no greater than 0/10 when performing work tasks to improve performance of their Instrumental Activities of Daily Living.   (Progressing)  "      Start:  05/12/25    Expected End:  09/15/25            pt will demonstrate at least 95% LSI with HHD for flexion, abduction, ER, and IR for improved stability  (Progressing)       Start:  05/12/25    Expected End:  09/15/25                Primo Nassar, PT, DPT

## 2025-05-22 ENCOUNTER — OFFICE VISIT (OUTPATIENT)
Dept: SPORTS MEDICINE | Facility: CLINIC | Age: 70
End: 2025-05-22
Payer: COMMERCIAL

## 2025-05-22 DIAGNOSIS — Z98.890 S/P LEFT ROTATOR CUFF REPAIR: Primary | ICD-10-CM

## 2025-05-22 PROCEDURE — 99999 PR PBB SHADOW E&M-EST. PATIENT-LVL III: CPT | Mod: PBBFAC,,, | Performed by: PHYSICIAN ASSISTANT

## 2025-05-22 NOTE — PROGRESS NOTES
S:Juan Luis Mcgrath presents for post-operative evaluation.     DATE OF PROCEDURE: 5/9/2025      OPERATION:   Left  1. Shoulder arthroscopic rotator cuff repair (CPT 44608)  2. Shoulder arthroscopic biceps tenodesis (CPT 40346)  3. Shoulder arthroscopic extensive debridement (CPT 93563)    4. Shoulder arthroscopic distal clavicle excision (CPT 49105)  5. Shoulder arthroscopic loose body removal (CPT 11799)    6. Shoulder arthroscopic subacromial decompression    Juan Luis Mcgrath reports to be doing well 2wk s/p the above mentioned procedure. Denies fevers, chills, night sweats, chest pain, difficulty breathing, calf pain or tenderness. Going to PT 2xWeek at the [facility]. Seeing good progress daily. Pain levels are improving. Has d/c'd pain medication.     O: The incisions are healing well.  No signs of infection.  Sutures were removed. No significant pain or unusual tenderness.    A/P: Doing great. Incisions healing well. Ok to shower with incisions uncovered. No submerging at this point. Continue sling for another month, no active ROM until then. Nothing heavier than his phone or a coke can in the hand. Plan to follow the rehab plan as previously outlined. RTC in 4 weeks.     POSTOPERATIVE PLAN OF CARE:  -Patient will be discharged home according to protocol.  -Physical Therapy: Follow the < 3 cm cuff repair rehab protocol. PROM starts in 1-2 weeks. AROM starts after 6 weeks. No cuff resistive activity x 12 weeks. No biceps resistive activity x 8 weeks. Sling and pillow immobilization for 6 weeks.  -DVT prophylaxis: ASA 81 mg twice a day x 2 weeks.

## 2025-05-26 ENCOUNTER — CLINICAL SUPPORT (OUTPATIENT)
Dept: REHABILITATION | Facility: HOSPITAL | Age: 70
End: 2025-05-26
Payer: COMMERCIAL

## 2025-05-26 DIAGNOSIS — M25.619 IMPAIRED RANGE OF MOTION OF SHOULDER, UNSPECIFIED LATERALITY: ICD-10-CM

## 2025-05-26 DIAGNOSIS — M25.512 ACUTE PAIN OF LEFT SHOULDER: Primary | ICD-10-CM

## 2025-05-26 PROCEDURE — 97530 THERAPEUTIC ACTIVITIES: CPT | Performed by: PHYSICAL THERAPIST

## 2025-05-26 PROCEDURE — 97112 NEUROMUSCULAR REEDUCATION: CPT | Performed by: PHYSICAL THERAPIST

## 2025-05-26 PROCEDURE — 97140 MANUAL THERAPY 1/> REGIONS: CPT | Performed by: PHYSICAL THERAPIST

## 2025-05-26 NOTE — PROGRESS NOTES
"  Outpatient Rehab    Physical Therapy Visit    Patient Name: Juan Luis Mcgrath  MRN: [id number]  YOB: 1955  Encounter Date: 5/26/2025    Therapy Diagnosis:   Encounter Diagnoses   Name Primary?    Acute pain of left shoulder Yes    Impaired range of motion of shoulder, unspecified laterality      Physician: Primo Nassar, PT, DPT    Physician Orders: Eval and Treat  Medical Diagnosis: Shoulder pain    Visit # / Visits Authorized:  7 / 10  Insurance Authorization Period: 2/13/2025 to 12/31/2025  Date of Evaluation: 5/12/2025  Plan of Care Certification: 5/12/2025 to 2/12/2026      PT/PTA:     Number of PTA visits since last PT visit:   Time In: 0700   Time Out: 0800  Total Time (in minutes): 60   Total Billable Time (in minutes): 60    FOTO:  Intake Score:  %  Survey Score 2:  %  Survey Score 3:  %    Precautions:       Subjective   He has been doing well this past week, exercises going well..  Pain reported as 2/10.      Objective            Treatment:  Manual Therapy  MT 1: Gr I-II oscillations pain relief and relaxation  MT 2: Gr II-III flexion 0-90 deg  MT 3: Gr II inferior mob  MT 4: ER 0-20 deg arm by side and propped up on towel  MT 5: Sling refit  Balance/Neuromuscular Re-Education  NMR 1: Table step backs 40x  NMR 2: Table slide flexion 4'  NMR 3: Table slide abduction 4'  Therapeutic Activity  TA 3: AAROM elbow flexion/extension 40x  TA 4: Seated scap squeezes 40x 3" hold  TA 5: Seated shrugs 40x 3" hold  TA 6: Patient education - sling wear, HEP compliance, healing timelines, and rehab prognosis    Time Entry(in minutes):  Manual Therapy Time Entry: 25  Neuromuscular Re-Education Time Entry: 25  Therapeutic Activity Time Entry: 10    Assessment & Plan   Assessment: Juan Luis progressed with PROM table slide and step backs today. He is doing very well post op, just stiff into flexion at start of session. Will continue to progress as tolerated  Evaluation/Treatment Tolerance: Patient tolerated " treatment well    Patient will continue to benefit from skilled outpatient physical therapy to address the deficits listed in the problem list box on initial evaluation, provide pt/family education and to maximize pt's level of independence in the home and community environment.     Patient's spiritual, cultural, and educational needs considered and patient agreeable to plan of care and goals.           Plan: Progress per RCR protocol    Goals:   Active       Goals       Pt will demonstrate independence with initial HEP to improve their performance of their Activities of Daily Living.    (Progressing)       Start:  05/12/25    Expected End:  06/23/25            Pt will demonstrate shoulder ROM 0-90 deg flexion and 15 deg ER (Progressing)       Start:  05/12/25    Expected End:  06/23/25            Patient will improve their numeric pain rating score to no greater than 2/10 when performing ADL to improve performance of their Activities of Daily Living.   (Progressing)       Start:  05/12/25    Expected End:  06/23/25            Pt will demonstrate independence with initial HEP to improve their performance of their Instrumental Activities of Daily Living.    (Progressing)       Start:  05/12/25    Expected End:  09/15/25            Patient will improve their numeric pain rating score to no greater than 0/10 when performing work tasks to improve performance of their Instrumental Activities of Daily Living.   (Progressing)       Start:  05/12/25    Expected End:  09/15/25            pt will demonstrate at least 95% LSI with HHD for flexion, abduction, ER, and IR for improved stability  (Progressing)       Start:  05/12/25    Expected End:  09/15/25                Primo Nassar, PT, DPT

## 2025-06-03 ENCOUNTER — CLINICAL SUPPORT (OUTPATIENT)
Dept: REHABILITATION | Facility: HOSPITAL | Age: 70
End: 2025-06-03
Payer: COMMERCIAL

## 2025-06-03 DIAGNOSIS — M25.619 IMPAIRED RANGE OF MOTION OF SHOULDER, UNSPECIFIED LATERALITY: ICD-10-CM

## 2025-06-03 DIAGNOSIS — M25.512 ACUTE PAIN OF LEFT SHOULDER: Primary | ICD-10-CM

## 2025-06-03 PROCEDURE — 97530 THERAPEUTIC ACTIVITIES: CPT | Performed by: PHYSICAL THERAPIST

## 2025-06-03 PROCEDURE — 97112 NEUROMUSCULAR REEDUCATION: CPT | Performed by: PHYSICAL THERAPIST

## 2025-06-03 PROCEDURE — 97140 MANUAL THERAPY 1/> REGIONS: CPT | Performed by: PHYSICAL THERAPIST

## 2025-06-09 ENCOUNTER — CLINICAL SUPPORT (OUTPATIENT)
Dept: REHABILITATION | Facility: HOSPITAL | Age: 70
End: 2025-06-09
Payer: COMMERCIAL

## 2025-06-09 DIAGNOSIS — M25.512 ACUTE PAIN OF LEFT SHOULDER: Primary | ICD-10-CM

## 2025-06-09 DIAGNOSIS — M25.619 IMPAIRED RANGE OF MOTION OF SHOULDER, UNSPECIFIED LATERALITY: ICD-10-CM

## 2025-06-09 PROCEDURE — 97140 MANUAL THERAPY 1/> REGIONS: CPT | Performed by: PHYSICAL THERAPIST

## 2025-06-09 PROCEDURE — 97112 NEUROMUSCULAR REEDUCATION: CPT | Performed by: PHYSICAL THERAPIST

## 2025-06-09 NOTE — PROGRESS NOTES
Outpatient Rehab    Physical Therapy Visit    Patient Name: Juan Luis Mcgrath  MRN: [id number]  YOB: 1955  Encounter Date: 6/9/2025    Therapy Diagnosis:   Encounter Diagnoses   Name Primary?    Acute pain of left shoulder Yes    Impaired range of motion of shoulder, unspecified laterality        Physician: Primo Nassar, PT, DPT    Physician Orders: Eval and Treat  Medical Diagnosis: Shoulder pain    Visit # / Visits Authorized:  9 / 10  Insurance Authorization Period: 2/13/2025 to 12/31/2025  Date of Evaluation: 5/12/2025  Plan of Care Certification: 5/12/2025 to 2/12/2026      PT/PTA:     Number of PTA visits since last PT visit:   Time In: 1000   Time Out: 1100  Total Time (in minutes): 60   Total Billable Time (in minutes): 55    FOTO:  Intake Score:  %  Survey Score 2:  %  Survey Score 3:  %    Precautions:  Right Lower Extremity Weight-Bearing Status: Non-weight-bearing         Subjective   Continues to feel really good, each week better. Bruising is better this week.  Pain reported as 1/10.      Objective      Shoulder Range of Motion  Left Shoulder   Active (deg) Passive (deg) Pain   Flexion   90     Extension         Scaption         ABduction         ADduction         Horizontal ABduction         Horizontal ADduction         External Rotation (Shoulder ABducted 0 degrees)   25     External Rotation (Shoulder ABducted 45 degrees)         External Rotation (Shoulder ABducted 90 degrees)         Internal Rotation (Shoulder ABducted 0 degrees)         Internal Rotation (Shoulder ABducted 45 degrees)         Internal Rotation (Shoulder ABducted 90 degrees)                          Treatment:  Manual Therapy  MT 1: Gr I-II oscillations pain relief and relaxation  MT 2: Gr II-III flexion 0-90 deg  MT 3: Gr II inferior mob  MT 4: ER 0-20 deg arm by side and propped up on towel  MT 5: PROM elbow flexion/extension  Balance/Neuromuscular Re-Education  NMR 1: Table step backs 40x  NMR 2: Table  "slide flexion 4'  NMR 3: Table slide abduction 4'  NMR 4: ER wand 20x 5"  NMR 5: Wand elbow extension stretch wand 20x 5"    Time Entry(in minutes):  Manual Therapy Time Entry: 20  Neuromuscular Re-Education Time Entry: 40    Assessment & Plan   Assessment: Juan Luis progressed with PROM today, excellent range for 5 weeks post op. Progressing per protocol, out the sling next week  Evaluation/Treatment Tolerance: Patient tolerated treatment well    Patient will continue to benefit from skilled outpatient physical therapy to address the deficits listed in the problem list box on initial evaluation, provide pt/family education and to maximize pt's level of independence in the home and community environment.     Patient's spiritual, cultural, and educational needs considered and patient agreeable to plan of care and goals.           Plan: Progress per RCR protocol    Goals:   Active       Goals       Pt will demonstrate independence with initial HEP to improve their performance of their Activities of Daily Living.    (Progressing)       Start:  05/12/25    Expected End:  06/23/25            Pt will demonstrate shoulder ROM 0-90 deg flexion and 15 deg ER (Progressing)       Start:  05/12/25    Expected End:  06/23/25            Patient will improve their numeric pain rating score to no greater than 2/10 when performing ADL to improve performance of their Activities of Daily Living.   (Progressing)       Start:  05/12/25    Expected End:  06/23/25            Pt will demonstrate independence with initial HEP to improve their performance of their Instrumental Activities of Daily Living.    (Progressing)       Start:  05/12/25    Expected End:  09/15/25            Patient will improve their numeric pain rating score to no greater than 0/10 when performing work tasks to improve performance of their Instrumental Activities of Daily Living.   (Progressing)       Start:  05/12/25    Expected End:  09/15/25            pt will " demonstrate at least 95% LSI with HHD for flexion, abduction, ER, and IR for improved stability  (Progressing)       Start:  05/12/25    Expected End:  09/15/25                  Primo Nassar PT, ELINT

## 2025-06-10 ENCOUNTER — TELEPHONE (OUTPATIENT)
Dept: SPORTS MEDICINE | Facility: CLINIC | Age: 70
End: 2025-06-10
Payer: COMMERCIAL

## 2025-06-10 ENCOUNTER — PATIENT MESSAGE (OUTPATIENT)
Dept: SPORTS MEDICINE | Facility: CLINIC | Age: 70
End: 2025-06-10
Payer: COMMERCIAL

## 2025-06-10 NOTE — TELEPHONE ENCOUNTER
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

## 2025-06-15 NOTE — PROGRESS NOTES
S:Juan Luis Mcgrath presents for post-operative evaluation.     DATE OF PROCEDURE: 5/9/2025   OPERATION:   Left  1. Shoulder arthroscopic rotator cuff repair (CPT 99093)  2. Shoulder arthroscopic biceps tenodesis (CPT 04409)  3. Shoulder arthroscopic extensive debridement (CPT 52656)    4. Shoulder arthroscopic distal clavicle excision (CPT 37384)  5. Shoulder arthroscopic loose body removal (CPT 32189)    6. Shoulder arthroscopic subacromial decompression    Juan Luis Mcgrath reports to be doing well 5 weeks 6 days status post above procedure. Going to PT at [facility] with Primo.  Accompanied by his wife.  Doing well.  No complaints.  No numbness or tingling.    O:  Exam of the left shoulder demonstrates well-healed incisions.  Perhaps some mild biceps asymmetry compared to the contralateral side but no Deangelo deformity.  Active forward elevation to , passive to 120-130 with soft endpoint.  Active external rotation with arm at side to 30, passive to 40-50.  Cuff activates well.  Motor and sensory intact to the left hand.    A/P: Arthroscopic pictures were reviewed with the patient.  Discussed the rehab plan.  May begin the active phase of recovery.  Discontinue sling and pillow.  Focus on regaining both active and passive range of motion over the next 6 weeks.  No lifting more than a coke can or small book.  Resisted/strengthening phase of recovery starts at 12 weeks postop.  Cautioned against doing too much too soon.  He needs to protect the shoulder.  I do think we need to consider holding him out of work until the middle or end of July.  I will see him back in 6-7 weeks.

## 2025-06-16 ENCOUNTER — CLINICAL SUPPORT (OUTPATIENT)
Dept: REHABILITATION | Facility: HOSPITAL | Age: 70
End: 2025-06-16
Payer: COMMERCIAL

## 2025-06-16 DIAGNOSIS — M25.619 IMPAIRED RANGE OF MOTION OF SHOULDER, UNSPECIFIED LATERALITY: ICD-10-CM

## 2025-06-16 DIAGNOSIS — M25.512 ACUTE PAIN OF LEFT SHOULDER: Primary | ICD-10-CM

## 2025-06-16 PROCEDURE — 97140 MANUAL THERAPY 1/> REGIONS: CPT | Performed by: PHYSICAL THERAPIST

## 2025-06-16 PROCEDURE — 97112 NEUROMUSCULAR REEDUCATION: CPT | Performed by: PHYSICAL THERAPIST

## 2025-06-16 NOTE — PROGRESS NOTES
Outpatient Rehab    Physical Therapy Progress Note    Patient Name: Juan Luis Mcgrath  MRN: [id number]  YOB: 1955  Encounter Date: 6/16/2025    Therapy Diagnosis:   Encounter Diagnoses   Name Primary?    Acute pain of left shoulder Yes    Impaired range of motion of shoulder, unspecified laterality      Physician: Primo Nassar, PT, DPT    Physician Orders: Eval and Treat  Medical Diagnosis: Shoulder pain  Surgical Diagnosis: L RCR   Surgical Date: 5/9/2025  Days Since Last Surgery: 38    Visit # / Visits Authorized:  10 / 10  Insurance Authorization Period: 2/13/2025 to 12/31/2025  Date of Evaluation: 5/12/2025  Plan of Care Certification: 5/12/2025 to 2/12/2026      PT/PTA:     Number of PTA visits since last PT visit:   Time In: 1100   Time Out: 1200  Total Time (in minutes): 60   Total Billable Time (in minutes): 60    FOTO:  Intake Score:  %  Survey Score 2:  %  Survey Score 3:  %    Precautions:       Subjective   Notes some soreness after wearing his other sling without the pillow yesterday. He was cooking yesterday and using the arm more. Sees MD later this week.  Pain reported as 2/10.      Objective      Shoulder Range of Motion  Left Shoulder   Active (deg) Passive (deg) Pain   Flexion   90     Extension         Scaption         ABduction         ADduction         Horizontal ABduction         Horizontal ADduction         External Rotation (Shoulder ABducted 0 degrees)   25     External Rotation (Shoulder ABducted 45 degrees)         External Rotation (Shoulder ABducted 90 degrees)         Internal Rotation (Shoulder ABducted 0 degrees)         Internal Rotation (Shoulder ABducted 45 degrees)         Internal Rotation (Shoulder ABducted 90 degrees)                          Treatment:  Manual Therapy  MT 1: Gr I-II oscillations pain relief and relaxation  MT 2: Gr II-III flexion 0-90 deg  MT 3: Gr II inferior mob  MT 4: ER 0-25 deg arm by side and propped up on towel  MT 5: PROM elbow  "flexion/extension  Balance/Neuromuscular Re-Education  NMR 1: Table step backs 40x  NMR 2: Table slide flexion 4'  NMR 3: Table slide scaption 4'  NMR 4: ER wand 20x 5" propepd on wedge  NMR 5: ER towel under arm with dowel stretch 20x 3"    Time Entry(in minutes):  Manual Therapy Time Entry: 25  Neuromuscular Re-Education Time Entry: 35    Assessment & Plan   Assessment: Progressed with PROM with wand today, a little limited ER arm by his side. Passive motion improved within session.        The patient will continue to benefit from skilled outpatient physical therapy in order to address the deficits listed in the problem list on the initial evaluation, provide patient and family education, and maximize the patients level of independence in the home and community environments.     The patient's spiritual, cultural, and educational needs were considered, and the patient is agreeable to the plan of care and goals.           Plan: Progress per RCR protocol    Goals:   Active       Goals       Pt will demonstrate independence with initial HEP to improve their performance of their Activities of Daily Living.    (Met)       Start:  05/12/25    Expected End:  06/23/25    Resolved:  06/16/25         Pt will demonstrate shoulder ROM 0-90 deg flexion and 15 deg ER (Met)       Start:  05/12/25    Expected End:  06/23/25    Resolved:  06/16/25         Patient will improve their numeric pain rating score to no greater than 2/10 when performing ADL to improve performance of their Activities of Daily Living.   (Met)       Start:  05/12/25    Expected End:  06/23/25    Resolved:  06/16/25         Pt will demonstrate independence with initial HEP to improve their performance of their Instrumental Activities of Daily Living.    (Progressing)       Start:  05/12/25    Expected End:  09/15/25            Patient will improve their numeric pain rating score to no greater than 0/10 when performing work tasks to improve performance of " their Instrumental Activities of Daily Living.   (Progressing)       Start:  05/12/25    Expected End:  09/15/25            pt will demonstrate at least 95% LSI with HHD for flexion, abduction, ER, and IR for improved stability  (Progressing)       Start:  05/12/25    Expected End:  09/15/25                Primo Nassar, PT, DPT

## 2025-06-19 ENCOUNTER — OFFICE VISIT (OUTPATIENT)
Dept: SPORTS MEDICINE | Facility: CLINIC | Age: 70
End: 2025-06-19
Payer: COMMERCIAL

## 2025-06-19 VITALS
SYSTOLIC BLOOD PRESSURE: 122 MMHG | BODY MASS INDEX: 26.95 KG/M2 | HEIGHT: 70 IN | WEIGHT: 188.25 LBS | DIASTOLIC BLOOD PRESSURE: 81 MMHG | HEART RATE: 59 BPM

## 2025-06-19 DIAGNOSIS — Z09 SURGERY FOLLOW-UP EXAMINATION: Primary | ICD-10-CM

## 2025-06-19 PROCEDURE — 3044F HG A1C LEVEL LT 7.0%: CPT | Mod: CPTII,S$GLB,, | Performed by: ORTHOPAEDIC SURGERY

## 2025-06-19 PROCEDURE — 1159F MED LIST DOCD IN RCRD: CPT | Mod: CPTII,S$GLB,, | Performed by: ORTHOPAEDIC SURGERY

## 2025-06-19 PROCEDURE — 99024 POSTOP FOLLOW-UP VISIT: CPT | Mod: S$GLB,,, | Performed by: ORTHOPAEDIC SURGERY

## 2025-06-19 PROCEDURE — 99999 PR PBB SHADOW E&M-EST. PATIENT-LVL III: CPT | Mod: PBBFAC,,, | Performed by: ORTHOPAEDIC SURGERY

## 2025-06-19 PROCEDURE — 3288F FALL RISK ASSESSMENT DOCD: CPT | Mod: CPTII,S$GLB,, | Performed by: ORTHOPAEDIC SURGERY

## 2025-06-19 PROCEDURE — 1125F AMNT PAIN NOTED PAIN PRSNT: CPT | Mod: CPTII,S$GLB,, | Performed by: ORTHOPAEDIC SURGERY

## 2025-06-19 PROCEDURE — 3079F DIAST BP 80-89 MM HG: CPT | Mod: CPTII,S$GLB,, | Performed by: ORTHOPAEDIC SURGERY

## 2025-06-19 PROCEDURE — 1101F PT FALLS ASSESS-DOCD LE1/YR: CPT | Mod: CPTII,S$GLB,, | Performed by: ORTHOPAEDIC SURGERY

## 2025-06-19 PROCEDURE — 3074F SYST BP LT 130 MM HG: CPT | Mod: CPTII,S$GLB,, | Performed by: ORTHOPAEDIC SURGERY

## 2025-07-01 ENCOUNTER — CLINICAL SUPPORT (OUTPATIENT)
Dept: REHABILITATION | Facility: HOSPITAL | Age: 70
End: 2025-07-01
Payer: COMMERCIAL

## 2025-07-01 DIAGNOSIS — M25.619 IMPAIRED RANGE OF MOTION OF SHOULDER, UNSPECIFIED LATERALITY: ICD-10-CM

## 2025-07-01 DIAGNOSIS — M25.512 ACUTE PAIN OF LEFT SHOULDER: Primary | ICD-10-CM

## 2025-07-01 PROCEDURE — 97530 THERAPEUTIC ACTIVITIES: CPT | Performed by: PHYSICAL THERAPIST

## 2025-07-01 PROCEDURE — 97140 MANUAL THERAPY 1/> REGIONS: CPT | Performed by: PHYSICAL THERAPIST

## 2025-07-01 PROCEDURE — 97112 NEUROMUSCULAR REEDUCATION: CPT | Performed by: PHYSICAL THERAPIST

## 2025-07-01 NOTE — PROGRESS NOTES
Outpatient Rehab    Physical Therapy Progress Note    Patient Name: Juan Luis Mcgrath  MRN: [id number]  YOB: 1955  Encounter Date: 7/1/2025    Therapy Diagnosis:   Encounter Diagnoses   Name Primary?    Acute pain of left shoulder Yes    Impaired range of motion of shoulder, unspecified laterality      Physician: Primo Nassar, PT, DPT    Physician Orders: Eval and Treat  Medical Diagnosis: Shoulder pain  Surgical Diagnosis: L RCR   Surgical Date: 5/9/2025  Days Since Last Surgery: 53    Visit # / Visits Authorized:  11 / 20  Insurance Authorization Period: 2/13/2025 to 12/31/2025  Date of Evaluation: 5/12/2025  Plan of Care Certification: 5/12/2025 to 2/12/2026      PT/PTA:     Number of PTA visits since last PT visit:   Time In: 0800   Time Out: 0900  Total Time (in minutes): 60   Total Billable Time (in minutes): 60    FOTO:  Intake Score: 50%  Survey Score 2: 47%  Survey Score 3:  %    Precautions:       Subjective   Went to the gym yesterday. it feels good doing the new exercises, getting better. He will be driving to [place] soon.  Pain reported as 1/10.      Objective            Treatment:  Manual Therapy  MT 1: Gr I-IV flexion pain free range  MT 2: Gr III-IV ER propped on towel  MT 3: Inferior mob Gr III  MT 4: Scapular mobilizations (framing, sidelying)  MT 5: AC/SC joint mobs Gr I-IV  Balance/Neuromuscular Re-Education  NMR 4: Prone extensions 3 x 10  NMR 5: Prone row 3 x 10  NMR 6: Pulley flexion 4'  Therapeutic Activity  TA 1: UBE 4'/4' for UE mobility, cardiovascular endurance, and improve push/pull of the shoulder  TA 2: Towel slides up wall 3 x 10  TA 3: Landmine press dowel 3 x 10  TA 4: AAROM wand flexion 30x  TA 5: AAROM wand ER 30x arm propped on towel  TA 6: Side lying dowel flexion 30x  TA 7: Patient education - proper exercise form, HEP compliance, tissue healing timelines    Time Entry(in minutes):  Manual Therapy Time Entry: 15  Neuromuscular Re-Education Time Entry:  15  Therapeutic Activity Time Entry: 30    Assessment & Plan   Assessment: Progressed to AAROM today with the shoulder. Reviewed HEP, motion continues to improve. Noted relief with the UBE at the start of session  Evaluation/Treatment Tolerance: Patient tolerated treatment well    The patient will continue to benefit from skilled outpatient physical therapy in order to address the deficits listed in the problem list on the initial evaluation, provide patient and family education, and maximize the patients level of independence in the home and community environments.     The patient's spiritual, cultural, and educational needs were considered, and the patient is agreeable to the plan of care and goals.           Plan: Progress per RCR protocol    Goals:   Active       Goals       Pt will demonstrate independence with initial HEP to improve their performance of their Activities of Daily Living.    (Met)       Start:  05/12/25    Expected End:  06/23/25    Resolved:  06/16/25         Pt will demonstrate shoulder ROM 0-90 deg flexion and 15 deg ER (Met)       Start:  05/12/25    Expected End:  06/23/25    Resolved:  06/16/25         Patient will improve their numeric pain rating score to no greater than 2/10 when performing ADL to improve performance of their Activities of Daily Living.   (Met)       Start:  05/12/25    Expected End:  06/23/25    Resolved:  06/16/25         Pt will demonstrate independence with initial HEP to improve their performance of their Instrumental Activities of Daily Living.    (Progressing)       Start:  05/12/25    Expected End:  09/15/25            Patient will improve their numeric pain rating score to no greater than 0/10 when performing work tasks to improve performance of their Instrumental Activities of Daily Living.   (Progressing)       Start:  05/12/25    Expected End:  09/15/25            pt will demonstrate at least 95% LSI with HHD for flexion, abduction, ER, and IR for improved  stability  (Progressing)       Start:  05/12/25    Expected End:  09/15/25                Primo Nassar PT, ELINT

## 2025-07-09 ENCOUNTER — CLINICAL SUPPORT (OUTPATIENT)
Dept: REHABILITATION | Facility: HOSPITAL | Age: 70
End: 2025-07-09
Payer: COMMERCIAL

## 2025-07-09 ENCOUNTER — PATIENT MESSAGE (OUTPATIENT)
Dept: ORTHOPEDICS | Facility: CLINIC | Age: 70
End: 2025-07-09
Payer: COMMERCIAL

## 2025-07-09 DIAGNOSIS — M51.360 DEGENERATION OF INTERVERTEBRAL DISC OF LUMBAR REGION WITH DISCOGENIC BACK PAIN: ICD-10-CM

## 2025-07-09 DIAGNOSIS — M43.10 SPONDYLOLISTHESIS, ACQUIRED: Primary | ICD-10-CM

## 2025-07-09 DIAGNOSIS — M25.512 ACUTE PAIN OF LEFT SHOULDER: Primary | ICD-10-CM

## 2025-07-09 DIAGNOSIS — M25.619 IMPAIRED RANGE OF MOTION OF SHOULDER, UNSPECIFIED LATERALITY: ICD-10-CM

## 2025-07-09 PROCEDURE — 97112 NEUROMUSCULAR REEDUCATION: CPT | Performed by: PHYSICAL THERAPIST

## 2025-07-09 PROCEDURE — 97140 MANUAL THERAPY 1/> REGIONS: CPT | Performed by: PHYSICAL THERAPIST

## 2025-07-09 PROCEDURE — 97530 THERAPEUTIC ACTIVITIES: CPT | Performed by: PHYSICAL THERAPIST

## 2025-07-09 NOTE — PROGRESS NOTES
Outpatient Rehab    Physical Therapy Progress Note    Patient Name: Juan Luis Mcgrath  MRN: [id number]  YOB: 1955  Encounter Date: 7/9/2025    Therapy Diagnosis:   Encounter Diagnoses   Name Primary?    Acute pain of left shoulder Yes    Impaired range of motion of shoulder, unspecified laterality      Physician: Primo Nassar, PT, DPT    Physician Orders: Eval and Treat  Medical Diagnosis: Shoulder pain  Surgical Diagnosis: L RCR   Surgical Date: 5/9/2025  Days Since Last Surgery: 61    Visit # / Visits Authorized:  12 / 20  Insurance Authorization Period: 2/13/2025 to 12/31/2025  Date of Evaluation: 5/12/2025  Plan of Care Certification: 5/12/2025 to 2/12/2026      PT/PTA:     Number of PTA visits since last PT visit:   Time In: 0900   Time Out: 1000  Total Time (in minutes): 60   Total Billable Time (in minutes): 60    FOTO:  Intake Score:  %  Survey Score 2:  %  Survey Score 3:  %    Precautions:       Subjective   Shoulder is doing better, each week better. Supposed to travel to [place] next week but trying to not go. Return to work 7/20.  Pain reported as 1/10.      Objective            Treatment:  Manual Therapy  MT 1: Gr I-IV flexion pain free range  MT 2: Gr III-IV ER propped on towel  MT 3: Inferior mob Gr III  MT 4: Scapular mobilizations (framing, sidelying)  Balance/Neuromuscular Re-Education  NMR 1: Side lying ER to neutral 3 x 12  NMR 2: Towel slides yellow band 2 x 15  NMR 3: IR/ER walkouts OTB 2 x 10  NMR 4: Prone extensions 2# 3 x 10  NMR 5: Prone row 2# 3 x 10  Therapeutic Activity  TA 1: UBE 4'/4' for UE mobility, cardiovascular endurance, and improve push/pull of the shoulder  TA 3: Landmine press dowel 2# 3 x 10  TA 4: AAROM wand flexion 30x  TA 5: AAROM wand ER 30x arm propped on towel  TA 7: Patient education - proper exercise form, HEP compliance, tissue healing timelines    Time Entry(in minutes):  Manual Therapy Time Entry: 10  Neuromuscular Re-Education Time Entry:  25  Therapeutic Activity Time Entry: 25    Assessment & Plan   Assessment: Juan Luis progressed AAROM and strengthening to the periscapular loading. Motion continue to improve, just notes crepitus with ER but no pain.   Evaluation/Treatment Tolerance: Patient tolerated treatment well    The patient will continue to benefit from skilled outpatient physical therapy in order to address the deficits listed in the problem list on the initial evaluation, provide patient and family education, and maximize the patients level of independence in the home and community environments.     The patient's spiritual, cultural, and educational needs were considered, and the patient is agreeable to the plan of care and goals.           Plan: Progress per RCR protocol    Goals:   Active       Goals       Pt will demonstrate independence with initial HEP to improve their performance of their Activities of Daily Living.    (Met)       Start:  05/12/25    Expected End:  06/23/25    Resolved:  06/16/25         Pt will demonstrate shoulder ROM 0-90 deg flexion and 15 deg ER (Met)       Start:  05/12/25    Expected End:  06/23/25    Resolved:  06/16/25         Patient will improve their numeric pain rating score to no greater than 2/10 when performing ADL to improve performance of their Activities of Daily Living.   (Met)       Start:  05/12/25    Expected End:  06/23/25    Resolved:  06/16/25         Pt will demonstrate independence with initial HEP to improve their performance of their Instrumental Activities of Daily Living.    (Progressing)       Start:  05/12/25    Expected End:  09/15/25            Patient will improve their numeric pain rating score to no greater than 0/10 when performing work tasks to improve performance of their Instrumental Activities of Daily Living.   (Progressing)       Start:  05/12/25    Expected End:  09/15/25            pt will demonstrate at least 95% LSI with HHD for flexion, abduction, ER, and IR for improved  stability  (Progressing)       Start:  05/12/25    Expected End:  09/15/25                Primo Nassar PT, ELINT

## 2025-07-11 ENCOUNTER — TELEPHONE (OUTPATIENT)
Dept: ORTHOPEDICS | Facility: CLINIC | Age: 70
End: 2025-07-11
Payer: COMMERCIAL

## 2025-07-16 ENCOUNTER — OFFICE VISIT (OUTPATIENT)
Dept: ORTHOPEDICS | Facility: CLINIC | Age: 70
End: 2025-07-16
Payer: COMMERCIAL

## 2025-07-16 ENCOUNTER — HOSPITAL ENCOUNTER (OUTPATIENT)
Dept: RADIOLOGY | Facility: HOSPITAL | Age: 70
Discharge: HOME | End: 2025-07-16
Attending: ORTHOPAEDIC SURGERY
Payer: COMMERCIAL

## 2025-07-16 VITALS — WEIGHT: 189.63 LBS | HEIGHT: 70 IN | BODY MASS INDEX: 27.15 KG/M2

## 2025-07-16 DIAGNOSIS — M51.362 DEGENERATION OF INTERVERTEBRAL DISC OF LUMBAR REGION WITH DISCOGENIC BACK PAIN AND LOWER EXTREMITY PAIN: ICD-10-CM

## 2025-07-16 DIAGNOSIS — M43.10 SPONDYLOLISTHESIS, ACQUIRED: ICD-10-CM

## 2025-07-16 DIAGNOSIS — M47.816 LUMBAR SPONDYLOSIS: ICD-10-CM

## 2025-07-16 DIAGNOSIS — M81.0 OSTEOPOROSIS, UNSPECIFIED OSTEOPOROSIS TYPE, UNSPECIFIED PATHOLOGICAL FRACTURE PRESENCE: ICD-10-CM

## 2025-07-16 DIAGNOSIS — M43.10 DEGENERATIVE SPONDYLOLISTHESIS: Primary | ICD-10-CM

## 2025-07-16 DIAGNOSIS — M51.360 DEGENERATION OF INTERVERTEBRAL DISC OF LUMBAR REGION WITH DISCOGENIC BACK PAIN: ICD-10-CM

## 2025-07-16 DIAGNOSIS — M54.16 LUMBAR RADICULOPATHY: ICD-10-CM

## 2025-07-16 PROCEDURE — 99999 PR PBB SHADOW E&M-EST. PATIENT-LVL III: CPT | Mod: PBBFAC,,, | Performed by: ORTHOPAEDIC SURGERY

## 2025-07-16 PROCEDURE — 1125F AMNT PAIN NOTED PAIN PRSNT: CPT | Mod: CPTII,S$GLB,, | Performed by: ORTHOPAEDIC SURGERY

## 2025-07-16 PROCEDURE — 3044F HG A1C LEVEL LT 7.0%: CPT | Mod: CPTII,S$GLB,, | Performed by: ORTHOPAEDIC SURGERY

## 2025-07-16 PROCEDURE — 1159F MED LIST DOCD IN RCRD: CPT | Mod: CPTII,S$GLB,, | Performed by: ORTHOPAEDIC SURGERY

## 2025-07-16 PROCEDURE — 72110 X-RAY EXAM L-2 SPINE 4/>VWS: CPT | Mod: TC

## 2025-07-16 PROCEDURE — 1160F RVW MEDS BY RX/DR IN RCRD: CPT | Mod: CPTII,S$GLB,, | Performed by: ORTHOPAEDIC SURGERY

## 2025-07-16 PROCEDURE — 3008F BODY MASS INDEX DOCD: CPT | Mod: CPTII,S$GLB,, | Performed by: ORTHOPAEDIC SURGERY

## 2025-07-16 PROCEDURE — 72110 X-RAY EXAM L-2 SPINE 4/>VWS: CPT | Mod: 26,,, | Performed by: RADIOLOGY

## 2025-07-16 PROCEDURE — 99214 OFFICE O/P EST MOD 30 MIN: CPT | Mod: 24,S$GLB,, | Performed by: ORTHOPAEDIC SURGERY

## 2025-07-16 RX ORDER — METHOCARBAMOL 500 MG/1
500 TABLET, FILM COATED ORAL 3 TIMES DAILY
Qty: 60 TABLET | Refills: 1 | Status: SHIPPED | OUTPATIENT
Start: 2025-07-16

## 2025-07-16 RX ORDER — GABAPENTIN 300 MG/1
300 CAPSULE ORAL 3 TIMES DAILY
Qty: 60 CAPSULE | Refills: 1 | Status: SHIPPED | OUTPATIENT
Start: 2025-07-16

## 2025-07-17 NOTE — PROGRESS NOTES
DATE: 7/17/2025  PATIENT: Juan Luis Mcgrath    Attending Physician: German Amador M.D.    CHIEF COMPLAINT: LBP and R buttock pain    HISTORY:  Juan Luis Mgcrath is a 69 y.o. male w/ a hx of L RCR presents for initial evaluation of low back and right buttock pain (Back - 3, Buttock - 3). The pain has been present for years. The patient describes the pain as dull and it radiates posteriorly down RLE to the buttock.  The pain is worse with activity and improved by rest. There is no associated numbness and tingling. There is no subjective weakness. Prior treatments have included meds (celebrex), PT, MARA, but no surgery.    The Patient denies myelopathic symptoms such as handwriting changes or difficulty with buttons/coins/keys. Denies perineal paresthesias, bowel/bladder dysfunction.    The patient does not smoke, have DM or endorse IVDU. The patient is not on any blood thinners and does not take chronic narcotics. 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    PAST MEDICAL/SURGICAL HISTORY:  Past Medical History:   Diagnosis Date    Degenerative joint disease (DJD) of lumbar spine     History of cold sores     History of skin cancer 8/19/2024    Prostate cancer 2013    Sacroiliitis 04/07/2021    Squamous cell carcinoma 09/23/2019     Past Surgical History:   Procedure Laterality Date    ANTERIOR CRUCIATE LIGAMENT REPAIR      APPENDECTOMY      ARTHROSCOPIC REPAIR OF ROTATOR CUFF OF SHOULDER Left 5/9/2025    Procedure: ARTHROSCOPY, SHOULDER, W/ ROTATOR CUFF REPAIR;  Surgeon: LAUREN Chambers MD;  Location: [facility] OR;  Service: Orthopedics;  Laterality: Left;  General with Regional - SINGLE SHOT INTERSCALENE.  Debridment Labrum, Rotator cuff and Cartilage    ARTHROSCOPY OF SHOULDER WITH REMOVAL OF DISTAL CLAVICLE Left 5/9/2025    Procedure: ARTHROSCOPY, SHOULDER, WITH DISTAL CLAVICLE EXCISION;  Surgeon: LAUREN Chambers MD;  Location: [facility] OR;  Service: Orthopedics;  Laterality: Left;  General with  Regional - SINGLE SHOT INTERSCALENE    ARTHROSCOPY,SHOULDER,WITH BICEPS TENODESIS Left 5/9/2025    Procedure: ARTHROSCOPY,SHOULDER,WITH BICEPS TENODESIS;  Surgeon: LAUREN Chambers MD;  Location: [facility];  Service: Orthopedics;  Laterality: Left;  General with Regional - SINGLE SHOT INTERSCALENE    FINGER AMPUTATION Right 9/23/2019    Procedure: AMPUTATION, FINGER;  Surgeon: Luna Tirado MD;  Location: [facility];  Service: Orthopedics;  Laterality: Right;  regional MAC    FINGER MASS EXCISION Right 9/9/2019    Procedure: EXCISION, MASS, FINGER small finger resection;  Surgeon: Luna Tirado MD;  Location: [facility];  Service: Orthopedics;  Laterality: Right;  regional MAC    INJECTION OF JOINT Right 7/22/2020    Procedure: INJECTION RIGHT SI JOINT AND RIGHT PIRIFORMIS INJECTION;  Surgeon: Roosevelt Reddy MD;  Location: [facility];  Service: Pain Management;  Laterality: Right;  NEEDS CONSENT, URGENT    INJECTION OF JOINT Right 4/7/2021    Procedure: INJECTION, JOINT, SI;  Surgeon: Roosevelt Reddy MD;  Location: [facility];  Service: Pain Management;  Laterality: Right;    PROSTATECTOMY      radical    REMOVAL OF NAIL OF DIGIT Right 8/19/2019    Procedure: REMOVAL, NAIL, DIGIT right small finger;  Surgeon: Luna Tirado MD;  Location: [facility];  Service: Orthopedics;  Laterality: Right;    TRANSFORAMINAL EPIDURAL INJECTION OF STEROID Right 10/4/2023    Procedure: INJECTION, STEROID, EPIDURAL, TRANSFORAMINAL APPROACH, RIGHT L4/L5 AND L5/S1;  Surgeon: Roosevelt Reddy MD;  Location: [facility];  Service: Pain Management;  Laterality: Right;       Current Medications: Current Medications[1]    Social History: Social History[2]    REVIEW OF SYSTEMS:  Constitution: Negative. Negative for chills, fever and night sweats.   Cardiovascular: Negative for chest pain and syncope.   Respiratory: Negative for cough and shortness of breath.   Gastrointestinal: See HPI. Negative for  "nausea/vomiting. Negative for abdominal pain.  Genitourinary: See HPI. Negative for discoloration or dysuria.  Hematologic/Lymphatic: negative for bleeding/clotting disorders.   Musculoskeletal: Negative for falls and muscle weakness.   Neurological: See HPI. no history of seizures. no history of cranial surgery or shunts.  Neurological: See HPI. No seizures.   Endocrine: Negative for polydipsia, polyphagia and polyuria.   Allergic/Immunologic: Negative for hives and persistent infections.     EXAM:  Ht 5' 10" (1.778 m)   Wt 86 kg (189 lb 9.5 oz)   BMI 27.20 kg/m²     PHYSICAL EXAMINATION:    General: The patient is a 69 y.o. male in no apparent distress, the patient is orientatied to person, place and time.  Psych: Normal mood and affect  HEENT: Vision grossly intact, hearing intact to the spoken word.  Lungs: Respirations unlabored.  Gait: Normal station and gait, no difficulty with toe or heel walk.   Skin: Dorsal lumbar skin negative for rashes, lesions, hairy patches and surgical scars. There is lower lumbar tenderness to palpation.  Range of motion: Lumbar range of motion is acceptable.  Spinal Balance: Global saggital and coronal spinal balance acceptable, no significant for scoliosis and kyphosis.  Musculoskeletal: No pain with the range of motion of the bilateral hips. No trochanteric tenderness to palpation.  Vascular: Bilateral lower extremities warm and well perfused, Dorsalis pedis pulses 2+ bilaterally.  Neurological: Normal strength and tone in all major motor groups in the bilateral lower extremities. Normal sensation to light touch in the L2-S1 dermatomes bilaterally.  Deep tendon reflexes symmetric 2+ in the bilateral lower extremities.  Negative Babinski bilaterally. Straight leg raise negative bilaterally.    IMAGING:   Today I independently reviewed the following images and my interpretations are as follows:    AP, Lat and Flex/Ex  upright L-spine demonstrate spondylosis and DDD. L4-5 " anterolisthesis measuring 6mm.    MRI lumbar showed L4-5 severe central stenosis.      Body mass index is 27.2 kg/m².  Hemoglobin A1C   Date Value Ref Range Status   04/02/2024 5.3 4.0 - 5.6 % Final     Comment:     ADA Screening Guidelines:  5.7-6.4%  Consistent with prediabetes  >or=6.5%  Consistent with diabetes    High levels of fetal hemoglobin interfere with the HbA1C  assay. Heterozygous hemoglobin variants (HbS, HgC, etc)do  not significantly interfere with this assay.   However, presence of multiple variants may affect accuracy.     03/13/2023 5.4 4.0 - 5.6 % Final     Comment:     ADA Screening Guidelines:  5.7-6.4%  Consistent with prediabetes  >or=6.5%  Consistent with diabetes    High levels of fetal hemoglobin interfere with the HbA1C  assay. Heterozygous hemoglobin variants (HbS, HgC, etc)do  not significantly interfere with this assay.   However, presence of multiple variants may affect accuracy.     12/29/2021 5.2 4.0 - 5.6 % Final     Comment:     ADA Screening Guidelines:  5.7-6.4%  Consistent with prediabetes  >or=6.5%  Consistent with diabetes    High levels of fetal hemoglobin interfere with the HbA1C  assay. Heterozygous hemoglobin variants (HbS, HgC, etc)do  not significantly interfere with this assay.   However, presence of multiple variants may affect accuracy.       Hemoglobin A1c   Date Value Ref Range Status   04/09/2025 5.1 4.0 - 5.6 % Final     Comment:     ADA Screening Guidelines:  5.7-6.4%  Consistent with prediabetes  >=6.5%  Consistent with diabetes    High levels of fetal hemoglobin interfere with the HbA1C  assay. Heterozygous hemoglobin variants (HbS, HgC, etc)do  not significantly interfere with this assay.   However, presence of multiple variants may affect accuracy.       ASSESSMENT/PLAN:    Juan Luis was seen today for pain.    Diagnoses and all orders for this visit:    Degenerative spondylolisthesis    Lumbar spondylosis    Degeneration of intervertebral disc of lumbar region  with discogenic back pain and lower extremity pain    Osteoporosis, unspecified osteoporosis type, unspecified pathological fracture presence  -     DXA Bone Density Axial Skeleton 1 or more sites; Future    Lumbar radiculopathy  -     methocarbamoL (ROBAXIN) 500 MG Tab; Take 1 tablet (500 mg total) by mouth 3 (three) times daily.  -     gabapentin (NEURONTIN) 300 MG capsule; Take 1 capsule (300 mg total) by mouth 3 (three) times daily.      Follow up if symptoms worsen or fail to improve.    Patient has lumbar degenerative spondylolisthesis and stenosis with RLE radiculopathy. I discussed the natural history of their diagnoses as well as surgical and nonsurgical treatment options. I educated the patient on the importance of core/back strengthening, correct posture, bending/lifting ergonomics, and low-impact aerobic exercises (walking, elliptical, and aquatherapy). I prescribed robaxin and gabapentin. Continue medications. I ordered DEXA to assess his bone quality. Patient will follow up PRN. Patient is a candidate for L4-5 PLDF/TLIF (R) if he fails conservative management.    German Amador MD  Orthopaedic Spine Surgeon  Department of Orthopaedic Surgery  [phone]           [1]   Current Outpatient Medications:     acetaminophen (TYLENOL) 500 MG tablet, Take 500 mg by mouth every 6 (six) hours as needed for Pain., Disp: , Rfl:     aspirin (ECOTRIN) 81 MG EC tablet, Take 1 tablet (81 mg total) by mouth 2 (two) times a day. for 14 days, Disp: 28 tablet, Rfl: 0    celecoxib (CELEBREX) 200 MG capsule, TAKE 1 CAPSULE (200 MG TOTAL) BY MOUTH ONCE DAILY., Disp: 40 capsule, Rfl: 1    gabapentin (NEURONTIN) 300 MG capsule, Take 1 capsule (300 mg total) by mouth 3 (three) times daily., Disp: 60 capsule, Rfl: 1    hydroquinone 8 % Emul, Compound hydroquinone 8% / tretinoin 0.025% / kojic acid 1% / niacinamide 4% / fluocinolone 0.025% cream. Apply a thin layer to dark spots on cheeks qhs. Do not use for longer than 12 weeks  in a row then take a 12 week break (Patient not taking: Reported on 6/19/2025), Disp: 30 g, Rfl: 1    methocarbamoL (ROBAXIN) 500 MG Tab, Take 1 tablet (500 mg total) by mouth 3 (three) times daily., Disp: 60 tablet, Rfl: 1    ondansetron (ZOFRAN-ODT) 4 MG TbDL, Take 1 tablet (4 mg total) by mouth every 8 (eight) hours as needed (nausea). (Patient not taking: Reported on 6/19/2025), Disp: 30 tablet, Rfl: 0    oxyCODONE (ROXICODONE) 5 MG immediate release tablet, Take 1-2 tablets (5-10 mg total) by mouth every 4 to 6 hours as needed for Pain. (Patient not taking: Reported on 6/19/2025), Disp: 28 tablet, Rfl: 0    traMADoL (ULTRAM) 50 mg tablet, Take 1 tablet (50 mg total) by mouth every 6 (six) hours. (Patient not taking: Reported on 6/19/2025), Disp: 25 tablet, Rfl: 0  No current facility-administered medications for this visit.    Facility-Administered Medications Ordered in Other Visits:     0.9%  NaCl infusion, , Intravenous, Continuous, Anthony Juan MD  [2]   Social History  Socioeconomic History    Marital status:    Occupational History    Occupation: anesthesiologist   Tobacco Use    Smoking status: Never    Smokeless tobacco: Never   Substance and Sexual Activity    Alcohol use: Yes     Comment: social    Drug use: No    Sexual activity: Yes     Partners: Female   Social History Narrative    Moved from [place] 3 weeks ago. Lives with wife.     Social Drivers of Health     Financial Resource Strain: Low Risk  (8/16/2024)    Overall Financial Resource Strain (CARDIA)     Difficulty of Paying Living Expenses: Not hard at all   Food Insecurity: No Food Insecurity (8/16/2024)    Hunger Vital Sign     Worried About Running Out of Food in the Last Year: Never true     Ran Out of Food in the Last Year: Never true   Transportation Needs: No Transportation Needs (9/5/2023)    PRAPARE - Transportation     Lack of Transportation (Medical): No     Lack of Transportation (Non-Medical): No   Physical Activity:  Sufficiently Active (8/16/2024)    Exercise Vital Sign     Days of Exercise per Week: 5 days     Minutes of Exercise per Session: 60 min   Stress: No Stress Concern Present (8/16/2024)    [employer] [place] [facility] [employer] - Occupational Stress Questionnaire     Feeling of Stress : Only a little   Housing Stability: Unknown (9/5/2023)    Housing Stability Vital Sign     Unable to Pay for Housing in the Last Year: No     Unstable Housing in the Last Year: Patient refused

## 2025-07-21 ENCOUNTER — CLINICAL SUPPORT (OUTPATIENT)
Dept: REHABILITATION | Facility: HOSPITAL | Age: 70
End: 2025-07-21
Payer: COMMERCIAL

## 2025-07-21 DIAGNOSIS — M25.619 IMPAIRED RANGE OF MOTION OF SHOULDER, UNSPECIFIED LATERALITY: ICD-10-CM

## 2025-07-21 DIAGNOSIS — M25.512 ACUTE PAIN OF LEFT SHOULDER: Primary | ICD-10-CM

## 2025-07-21 PROCEDURE — 97112 NEUROMUSCULAR REEDUCATION: CPT | Performed by: PHYSICAL THERAPIST

## 2025-07-21 PROCEDURE — 97530 THERAPEUTIC ACTIVITIES: CPT | Performed by: PHYSICAL THERAPIST

## 2025-07-21 NOTE — PROGRESS NOTES
"  Outpatient Rehab    Physical Therapy Progress Note    Patient Name: Juan Luis Mcgrath  MRN: [id number]  YOB: 1955  Encounter Date: 7/21/2025    Therapy Diagnosis:   Encounter Diagnoses   Name Primary?    Acute pain of left shoulder Yes    Impaired range of motion of shoulder, unspecified laterality      Physician: Primo Nassar, PT, DPT    Physician Orders: Eval and Treat  Medical Diagnosis: Shoulder pain  Surgical Diagnosis: L RCR   Surgical Date: 5/9/2025  Days Since Last Surgery: 73    Visit # / Visits Authorized:  13 / 20  Insurance Authorization Period: 2/13/2025 to 12/31/2025  Date of Evaluation: 5/12/2025  Plan of Care Certification: 5/12/2025 to 2/12/2026      PT/PTA:     Number of PTA visits since last PT visit:   Time In: 0900   Time Out: 1000  Total Time (in minutes): 60   Total Billable Time (in minutes): 30    FOTO:  Intake Score:  %  Survey Score 2:  %  Survey Score 3:  %    Precautions:       Subjective   Notes motion is okay but he is having a lot of pain which he attributes to the lack of cartilage in the shoulder.  Pain reported as 1/10.      Objective            Treatment:  Manual Therapy  MT 1: Gr I-IV flexion pain free range  MT 2: Gr III-IV ER propped on towel  MT 3: Inferior mob Gr III  MT 4: Scapular mobilizations (framing, sidelying)  MT 5: AC/SC joint mobs Gr I-IV  Balance/Neuromuscular Re-Education  NMR 1: No money YTB 2 x 10 3"  NMR 3: IR/ER walkouts OTB 2 x 10  NMR 4: Prone extensions 2# 3 x 10  NMR 5: Prone row 2# 3 x 10  Therapeutic Activity  TA 1: UBE 4'/4' for UE mobility, cardiovascular endurance, and improve push/pull of the shoulder  TA 2: Scaptions 1# 3 x 15  TA 3: Landmine press dowel 4# 3 x 10  TA 4: AAROM wand flexion 30x    Time Entry(in minutes):  Manual Therapy Time Entry: 15  Neuromuscular Re-Education Time Entry: 25  Therapeutic Activity Time Entry: 20    Assessment & Plan   Assessment: Fatigued with ER no money but noted it helped with his pain. " Crepitus with scaptions but able to complete all reps. Will load cuff starting 12 weeks, educated on humeral head migration as a result of cuff weakness       The patient will continue to benefit from skilled outpatient physical therapy in order to address the deficits listed in the problem list on the initial evaluation, provide patient and family education, and maximize the patients level of independence in the home and community environments.     The patient's spiritual, cultural, and educational needs were considered, and the patient is agreeable to the plan of care and goals.           Plan: Progress per RCR protocol    Goals:   Active       Goals       Pt will demonstrate independence with initial HEP to improve their performance of their Activities of Daily Living.    (Met)       Start:  05/12/25    Expected End:  06/23/25    Resolved:  06/16/25         Pt will demonstrate shoulder ROM 0-90 deg flexion and 15 deg ER (Met)       Start:  05/12/25    Expected End:  06/23/25    Resolved:  06/16/25         Patient will improve their numeric pain rating score to no greater than 2/10 when performing ADL to improve performance of their Activities of Daily Living.   (Met)       Start:  05/12/25    Expected End:  06/23/25    Resolved:  06/16/25         Pt will demonstrate independence with initial HEP to improve their performance of their Instrumental Activities of Daily Living.    (Progressing)       Start:  05/12/25    Expected End:  09/15/25            Patient will improve their numeric pain rating score to no greater than 0/10 when performing work tasks to improve performance of their Instrumental Activities of Daily Living.   (Progressing)       Start:  05/12/25    Expected End:  09/15/25            pt will demonstrate at least 95% LSI with HHD for flexion, abduction, ER, and IR for improved stability  (Progressing)       Start:  05/12/25    Expected End:  09/15/25                Primo Nassar, PT, DPT

## 2025-07-23 ENCOUNTER — HOSPITAL ENCOUNTER (OUTPATIENT)
Dept: RADIOLOGY | Facility: HOSPITAL | Age: 70
Discharge: HOME | End: 2025-07-23
Attending: ORTHOPAEDIC SURGERY
Payer: COMMERCIAL

## 2025-07-23 DIAGNOSIS — M81.0 OSTEOPOROSIS, UNSPECIFIED OSTEOPOROSIS TYPE, UNSPECIFIED PATHOLOGICAL FRACTURE PRESENCE: ICD-10-CM

## 2025-07-23 PROCEDURE — 77080 DXA BONE DENSITY AXIAL: CPT | Mod: TC

## 2025-07-23 NOTE — PROGRESS NOTES
CC: Left shoulder post-op follow up     DATE OF PROCEDURE: 5/9/2025   OPERATION:   Left  1. Shoulder arthroscopic rotator cuff repair (CPT 16853)  2. Shoulder arthroscopic biceps tenodesis (CPT 15812)  3. Shoulder arthroscopic extensive debridement (CPT 26415)    4. Shoulder arthroscopic distal clavicle excision (CPT 87334)  5. Shoulder arthroscopic loose body removal (CPT 25231)    6. Shoulder arthroscopic subacromial decompression    Juan Luis Mcgrath presents today for follow up appointment of his left shoulder. Patient is now 3 months status post above procedure. Continues PT at [facility] with Primo.  He states he was doing well and pleased with his overall recovery until about a week ago.  He had some increase in his pain.  No repeat trauma.  He does state that he feels that he may have done a bit too much perhaps or lifted too much but no known specific event.  He works with [id number] yesterday and I have spoken to his physical therapist who states that he responded well in PT.  It sounds like they ramped things up a bit yesterday which may be related to his increased pain and soreness today.  He does report some popping and clicking in the shoulder as well.  Celebrex once a day.  Gabapentin for his right hip.    Prior Hx 6/19/2025:   Juan Luis Mcgrath reports to be doing well 5 weeks 6 days status post above procedure. Going to PT at [facility] with Primo.  Accompanied by his wife.  Doing well.  No complaints.  No numbness or tingling.    REVIEW OF SYSTEMS:   Constitution: Negative. Negative for chills, fever and night sweats.    Hematologic/Lymphatic: Negative for bleeding problem. Does not bruise/bleed easily.   Skin: Negative for dry skin, itching and rash.   Musculoskeletal: Negative for falls.  Positive for Left shoulder pain and muscle weakness.     All other review of symptoms were reviewed and found to be noncontributory.     PAST MEDICAL HISTORY:   Past Medical History:   Diagnosis  Date    Degenerative joint disease (DJD) of lumbar spine     History of cold sores     History of skin cancer 8/19/2024    Prostate cancer 2013    Sacroiliitis 04/07/2021    Squamous cell carcinoma 09/23/2019     PAST SURGICAL HISTORY:   Past Surgical History:   Procedure Laterality Date    ANTERIOR CRUCIATE LIGAMENT REPAIR      APPENDECTOMY      ARTHROSCOPIC REPAIR OF ROTATOR CUFF OF SHOULDER Left 5/9/2025    Procedure: ARTHROSCOPY, SHOULDER, W/ ROTATOR CUFF REPAIR;  Surgeon: LAUREN Chambers MD;  Location: [facility];  Service: Orthopedics;  Laterality: Left;  General with Regional - SINGLE SHOT INTERSCALENE.  Debridment Labrum, Rotator cuff and Cartilage    ARTHROSCOPY OF SHOULDER WITH REMOVAL OF DISTAL CLAVICLE Left 5/9/2025    Procedure: ARTHROSCOPY, SHOULDER, WITH DISTAL CLAVICLE EXCISION;  Surgeon: LAUREN Chambers MD;  Location: [facility];  Service: Orthopedics;  Laterality: Left;  General with Regional - SINGLE SHOT INTERSCALENE    ARTHROSCOPY,SHOULDER,WITH BICEPS TENODESIS Left 5/9/2025    Procedure: ARTHROSCOPY,SHOULDER,WITH BICEPS TENODESIS;  Surgeon: LAUREN Chambers MD;  Location: [facility];  Service: Orthopedics;  Laterality: Left;  General with Regional - SINGLE SHOT INTERSCALENE    FINGER AMPUTATION Right 9/23/2019    Procedure: AMPUTATION, FINGER;  Surgeon: Luna Tirado MD;  Location: [facility] OR;  Service: Orthopedics;  Laterality: Right;  regional MAC    FINGER MASS EXCISION Right 9/9/2019    Procedure: EXCISION, MASS, FINGER small finger resection;  Surgeon: Luna Tirado MD;  Location: [facility] OR;  Service: Orthopedics;  Laterality: Right;  regional MAC    INJECTION OF JOINT Right 7/22/2020    Procedure: INJECTION RIGHT SI JOINT AND RIGHT PIRIFORMIS INJECTION;  Surgeon: Roosevelt Reddy MD;  Location: [facility] PAIN MGT;  Service: Pain Management;  Laterality: Right;  NEEDS CONSENT, URGENT    INJECTION OF JOINT Right 4/7/2021    Procedure: INJECTION, JOINT, SI;  Surgeon: Roosevelt CUELLAR  "MD Maureen;  Location: [facility] PAIN MGT;  Service: Pain Management;  Laterality: Right;    PROSTATECTOMY      radical    REMOVAL OF NAIL OF DIGIT Right 8/19/2019    Procedure: REMOVAL, NAIL, DIGIT right small finger;  Surgeon: Luna Tirado MD;  Location: [facility] OR;  Service: Orthopedics;  Laterality: Right;    TRANSFORAMINAL EPIDURAL INJECTION OF STEROID Right 10/4/2023    Procedure: INJECTION, STEROID, EPIDURAL, TRANSFORAMINAL APPROACH, RIGHT L4/L5 AND L5/S1;  Surgeon: Roosevelt Reddy MD;  Location: [facility] PAIN MGT;  Service: Pain Management;  Laterality: Right;     FAMILY HISTORY:   Family History   Problem Relation Name Age of Onset    No Known Problems Mother      Prostate cancer Father      Prostate cancer Maternal Uncle      Melanoma Neg Hx      Colon cancer Neg Hx       SOCIAL HISTORY:   Social History[1]    MEDICATIONS:   Current Medications[2]    ALLERGIES:   Review of patient's allergies indicates:  No Known Allergies     PHYSICAL EXAMINATION:  /80   Pulse 60   Ht 5' 10" (1.778 m)   Wt 85.7 kg (189 lb 0.7 oz)   BMI 27.13 kg/m²     General: Well-developed well-nourished 69 y.o. malein no acute distress   Cardiovascular: Regular rhythm by palpation of distal pulse, normal color and temperature, no concerning varicosities on symptomatic side   Lungs: No labored breathing or wheezing appreciated   Neuro: Alert and oriented ×3   Psychiatric: well oriented to person, place and time, demonstrates normal mood and affect   Skin: No rashes, lesions or ulcers, normal temperature, turgor, and texture on involved extremity    Ortho/SPM Exam  Exam of the left shoulder demonstrates well-healed incisions.  Perhaps some mild biceps asymmetry compared to the contralateral side but no Deangelo deformity.  Active forward elevation to 110-120, passive to 140 with soft endpoint.  Active external rotation with arm at side to 30, passive to 40-50.  Some pain with passive external rotation stretch.  Cuff activates " well.  No significant pain on light resisted testing.  Motor and sensory intact to the left hand.    IMAGING:  None    ASSESSMENT:      ICD-10-CM ICD-9-CM   1. Osteoarthritis of left glenohumeral joint  M19.012 715.91   2. Shoulder stiffness, left  M25.612 719.51       PLAN:     Findings discussed with the patient.  He has had some increased pain recently which I think is related to his activity level.  We reviewed his scope pictures from before.  He had significant full-thickness chondral wear of the humeral head which I think is relevant.  Higher risk in this case for postoperative stiffness and continued or recurrent pain.  We did discuss that the shoulder can become inflamed with increase in activity.  Cautioned against doing too much too soon.  No lifting more than 5 lb at this time.  Continue rehab.  We will need to work to calmed down the shoulder a bit.  Discussed taking Celebrex twice a day for now.  Hold off on a Medrol Dosepak.  He states he is not concerned enough to take a Medrol Dosepak.  I will see him back in 2 months.  We may have to consider a corticosteroid injection in the future should he have any significant arthritic related complaints.      Procedures          [1]   Social History  Socioeconomic History    Marital status:    Occupational History    Occupation: anesthesiologist   Tobacco Use    Smoking status: Never    Smokeless tobacco: Never   Substance and Sexual Activity    Alcohol use: Yes     Comment: social    Drug use: No    Sexual activity: Yes     Partners: Female   Social History Narrative    Moved from [place] 3 weeks ago. Lives with wife.     Social Drivers of Health     Financial Resource Strain: Low Risk  (8/16/2024)    Overall Financial Resource Strain (CARDIA)     Difficulty of Paying Living Expenses: Not hard at all   Food Insecurity: No Food Insecurity (8/16/2024)    Hunger Vital Sign     Worried About Running Out of Food in the Last Year: Never true     Ran Out of Food  in the Last Year: Never true   Transportation Needs: No Transportation Needs (9/5/2023)    PRAPARE - Transportation     Lack of Transportation (Medical): No     Lack of Transportation (Non-Medical): No   Physical Activity: Sufficiently Active (8/16/2024)    Exercise Vital Sign     Days of Exercise per Week: 5 days     Minutes of Exercise per Session: 60 min   Stress: No Stress Concern Present (8/16/2024)    [employer] - Occupational Stress Questionnaire     Feeling of Stress : Only a little   Housing Stability: Unknown (9/5/2023)    Housing Stability Vital Sign     Unable to Pay for Housing in the Last Year: No     Unstable Housing in the Last Year: Patient refused   [2]   Current Outpatient Medications:     acetaminophen (TYLENOL) 500 MG tablet, Take 500 mg by mouth every 6 (six) hours as needed for Pain., Disp: , Rfl:     celecoxib (CELEBREX) 200 MG capsule, TAKE 1 CAPSULE (200 MG TOTAL) BY MOUTH ONCE DAILY., Disp: 40 capsule, Rfl: 1    gabapentin (NEURONTIN) 300 MG capsule, Take 1 capsule (300 mg total) by mouth 3 (three) times daily., Disp: 60 capsule, Rfl: 1    methocarbamoL (ROBAXIN) 500 MG Tab, Take 1 tablet (500 mg total) by mouth 3 (three) times daily., Disp: 60 tablet, Rfl: 1    aspirin (ECOTRIN) 81 MG EC tablet, Take 1 tablet (81 mg total) by mouth 2 (two) times a day. for 14 days, Disp: 28 tablet, Rfl: 0    hydroquinone 8 % Emul, Compound hydroquinone 8% / tretinoin 0.025% / kojic acid 1% / niacinamide 4% / fluocinolone 0.025% cream. Apply a thin layer to dark spots on cheeks qhs. Do not use for longer than 12 weeks in a row then take a 12 week break (Patient not taking: Reported on 5/1/2025), Disp: 30 g, Rfl: 1    ondansetron (ZOFRAN-ODT) 4 MG TbDL, Take 1 tablet (4 mg total) by mouth every 8 (eight) hours as needed (nausea). (Patient not taking: Reported on 7/31/2025), Disp: 30 tablet, Rfl: 0    oxyCODONE (ROXICODONE) 5 MG immediate release tablet, Take 1-2 tablets (5-10  mg total) by mouth every 4 to 6 hours as needed for Pain. (Patient not taking: Reported on 7/31/2025), Disp: 28 tablet, Rfl: 0    traMADoL (ULTRAM) 50 mg tablet, Take 1 tablet (50 mg total) by mouth every 6 (six) hours. (Patient not taking: Reported on 7/31/2025), Disp: 25 tablet, Rfl: 0  No current facility-administered medications for this visit.    Facility-Administered Medications Ordered in Other Visits:     0.9%  NaCl infusion, , Intravenous, Continuous, Anthony Juan MD

## 2025-07-30 ENCOUNTER — CLINICAL SUPPORT (OUTPATIENT)
Dept: REHABILITATION | Facility: HOSPITAL | Age: 70
End: 2025-07-30
Payer: COMMERCIAL

## 2025-07-30 DIAGNOSIS — M25.512 ACUTE PAIN OF LEFT SHOULDER: Primary | ICD-10-CM

## 2025-07-30 DIAGNOSIS — M25.619 IMPAIRED RANGE OF MOTION OF SHOULDER, UNSPECIFIED LATERALITY: ICD-10-CM

## 2025-07-30 PROCEDURE — 97112 NEUROMUSCULAR REEDUCATION: CPT | Performed by: PHYSICAL THERAPIST

## 2025-07-30 PROCEDURE — 97140 MANUAL THERAPY 1/> REGIONS: CPT | Performed by: PHYSICAL THERAPIST

## 2025-07-30 NOTE — PROGRESS NOTES
Outpatient Rehab    Physical Therapy Progress Note    Patient Name: Juan Luis Mcgrath  MRN: [id number]  YOB: 1955  Encounter Date: 7/30/2025    Therapy Diagnosis:   Encounter Diagnoses   Name Primary?    Acute pain of left shoulder Yes    Impaired range of motion of shoulder, unspecified laterality      Physician: Primo Nassar, PT, DPT    Physician Orders: Eval and Treat  Medical Diagnosis: Shoulder pain  Surgical Diagnosis: L RCR   Surgical Date: 5/9/2025  Days Since Last Surgery: 82    Visit # / Visits Authorized:  14 / 20  Insurance Authorization Period: 2/13/2025 to 12/31/2025  Date of Evaluation: 5/12/2025  Plan of Care Certification: 5/12/2025 to 2/12/2026      PT/PTA:     Number of PTA visits since last PT visit:   Time In: 1000   Time Out: 1100  Total Time (in minutes): 60   Total Billable Time (in minutes): 60    FOTO:  Intake Score:  %  Survey Score 2:  %  Survey Score 3:  %    Precautions:       Subjective   He notes being a bit discourgaged by the pain, motion. He is still out of work..  Pain reported as 1/10.      Objective            Treatment:  Manual Therapy  MT 1: Gr I-IV flexion pain free range  MT 2: Gr III-IV ER propped on towel  MT 3: Inferior mob Gr III  MT 4: Scapular mobilizations (framing, sidelying)  Balance/Neuromuscular Re-Education  NMR 1: IR/ER GTB 3 x 12  NMR 2: Side lying ER 1# 3 x 15  NMR 3: Serratus slides YTB 30x  NMR 4: Wand flexion 30x    Time Entry(in minutes):  Manual Therapy Time Entry: 25  Neuromuscular Re-Education Time Entry: 35    Assessment & Plan   Assessment: Educated on healing progression for the shoulder. Began loading to the cuff today and updated his HEP. To see MD next visit  Evaluation/Treatment Tolerance: Patient tolerated treatment well    The patient will continue to benefit from skilled outpatient physical therapy in order to address the deficits listed in the problem list on the initial evaluation, provide patient and family education,  and maximize the patients level of independence in the home and community environments.     The patient's spiritual, cultural, and educational needs were considered, and the patient is agreeable to the plan of care and goals.           Plan: Assess tolerance to today's treatment    Goals:   Active       Goals       Pt will demonstrate independence with initial HEP to improve their performance of their Activities of Daily Living.    (Met)       Start:  05/12/25    Expected End:  06/23/25    Resolved:  06/16/25         Pt will demonstrate shoulder ROM 0-90 deg flexion and 15 deg ER (Met)       Start:  05/12/25    Expected End:  06/23/25    Resolved:  06/16/25         Patient will improve their numeric pain rating score to no greater than 2/10 when performing ADL to improve performance of their Activities of Daily Living.   (Met)       Start:  05/12/25    Expected End:  06/23/25    Resolved:  06/16/25         Pt will demonstrate independence with initial HEP to improve their performance of their Instrumental Activities of Daily Living.    (Progressing)       Start:  05/12/25    Expected End:  09/15/25            Patient will improve their numeric pain rating score to no greater than 0/10 when performing work tasks to improve performance of their Instrumental Activities of Daily Living.   (Progressing)       Start:  05/12/25    Expected End:  09/15/25            pt will demonstrate at least 95% LSI with HHD for flexion, abduction, ER, and IR for improved stability  (Progressing)       Start:  05/12/25    Expected End:  09/15/25                Primo Nassar, PT, DPT

## 2025-07-31 ENCOUNTER — OFFICE VISIT (OUTPATIENT)
Dept: SPORTS MEDICINE | Facility: CLINIC | Age: 70
End: 2025-07-31
Payer: COMMERCIAL

## 2025-07-31 VITALS
HEIGHT: 70 IN | DIASTOLIC BLOOD PRESSURE: 80 MMHG | HEART RATE: 60 BPM | WEIGHT: 189.06 LBS | BODY MASS INDEX: 27.07 KG/M2 | SYSTOLIC BLOOD PRESSURE: 139 MMHG

## 2025-07-31 DIAGNOSIS — M19.012 OSTEOARTHRITIS OF LEFT GLENOHUMERAL JOINT: Primary | ICD-10-CM

## 2025-07-31 DIAGNOSIS — M25.612 SHOULDER STIFFNESS, LEFT: ICD-10-CM

## 2025-07-31 PROCEDURE — 99999 PR PBB SHADOW E&M-EST. PATIENT-LVL III: CPT | Mod: PBBFAC,,, | Performed by: ORTHOPAEDIC SURGERY

## 2025-08-07 ENCOUNTER — CLINICAL SUPPORT (OUTPATIENT)
Dept: REHABILITATION | Facility: HOSPITAL | Age: 70
End: 2025-08-07
Attending: PHYSICAL THERAPIST
Payer: COMMERCIAL

## 2025-08-07 DIAGNOSIS — M25.512 ACUTE PAIN OF LEFT SHOULDER: Primary | ICD-10-CM

## 2025-08-07 DIAGNOSIS — M25.619 IMPAIRED RANGE OF MOTION OF SHOULDER, UNSPECIFIED LATERALITY: ICD-10-CM

## 2025-08-07 PROCEDURE — 97140 MANUAL THERAPY 1/> REGIONS: CPT | Performed by: PHYSICAL THERAPIST

## 2025-08-07 PROCEDURE — 97112 NEUROMUSCULAR REEDUCATION: CPT | Performed by: PHYSICAL THERAPIST

## 2025-08-07 NOTE — PROGRESS NOTES
Outpatient Rehab    Physical Therapy Progress Note    Patient Name: Juan Luis Mcgrath  MRN: [id number]  YOB: 1955  Encounter Date: 8/7/2025    Therapy Diagnosis:   Encounter Diagnoses   Name Primary?    Acute pain of left shoulder Yes    Impaired range of motion of shoulder, unspecified laterality      Physician: Primo Nassar, PT, DPT    Physician Orders: Eval and Treat  Medical Diagnosis: Shoulder pain  Surgical Diagnosis: L RCR   Surgical Date: 5/9/2025  Days Since Last Surgery: 90    Visit # / Visits Authorized:  15 / 20  Insurance Authorization Period: 2/13/2025 to 12/31/2025  Date of Evaluation: 5/12/2025  Plan of Care Certification: 5/12/2025 to 2/12/2026      PT/PTA:     Number of PTA visits since last PT visit:   Time In: 1109   Time Out: 1202  Total Time (in minutes): 53   Total Billable Time (in minutes): 53    FOTO:  Intake Score: 50%  Survey Score 2: 47%  Survey Score 3: 53%    Precautions:       Subjective   He was just having a bad day with Dr. Chambers. He needs a new exercise program for at home.  Pain reported as 0/10.      Objective            Treatment:  Manual Therapy  MT 1: Gr I-II oscillations pain relief and relaxation  MT 2: Gr II-IV flexion pain free range  MT 3: Gr II-IV inferior mob  MT 4: ER Gr II-IV arm by side and propped up on towel  MT 5: Centering glide AP Gr  II-III  Balance/Neuromuscular Re-Education  NMR 1: IR/ER OTB 3 x 12  NMR 2: Side lying ER 1# 3 x 15  NMR 4: Wand flexion 30x  NMR 5: Wand ER 30x towel prop    Time Entry(in minutes):  Manual Therapy Time Entry: 20  Neuromuscular Re-Education Time Entry: 33    Assessment & Plan   Assessment: Arrived with flexion up to 140 deg, pic sent to Dr. Chambers. Educated on cuff progression and strengthening. New HEP given today.   Evaluation/Treatment Tolerance: Patient tolerated treatment well    The patient will continue to benefit from skilled outpatient physical therapy in order to address the deficits listed in the  problem list on the initial evaluation, provide patient and family education, and maximize the patients level of independence in the home and community environments.     The patient's spiritual, cultural, and educational needs were considered, and the patient is agreeable to the plan of care and goals.           Plan: Assess tolerance to today's treatment    Goals:   Active       Goals       Pt will demonstrate independence with initial HEP to improve their performance of their Activities of Daily Living.    (Met)       Start:  05/12/25    Expected End:  06/23/25    Resolved:  06/16/25         Pt will demonstrate shoulder ROM 0-90 deg flexion and 15 deg ER (Met)       Start:  05/12/25    Expected End:  06/23/25    Resolved:  06/16/25         Patient will improve their numeric pain rating score to no greater than 2/10 when performing ADL to improve performance of their Activities of Daily Living.   (Met)       Start:  05/12/25    Expected End:  06/23/25    Resolved:  06/16/25         Pt will demonstrate independence with initial HEP to improve their performance of their Instrumental Activities of Daily Living.    (Progressing)       Start:  05/12/25    Expected End:  09/15/25            Patient will improve their numeric pain rating score to no greater than 0/10 when performing work tasks to improve performance of their Instrumental Activities of Daily Living.   (Progressing)       Start:  05/12/25    Expected End:  09/15/25            pt will demonstrate at least 95% LSI with HHD for flexion, abduction, ER, and IR for improved stability  (Progressing)       Start:  05/12/25    Expected End:  09/15/25                Primo Nassar, PT, DPT

## 2025-08-14 ENCOUNTER — OFFICE VISIT (OUTPATIENT)
Dept: SPORTS MEDICINE | Facility: CLINIC | Age: 70
End: 2025-08-14
Payer: COMMERCIAL

## 2025-08-14 ENCOUNTER — CLINICAL SUPPORT (OUTPATIENT)
Dept: REHABILITATION | Facility: HOSPITAL | Age: 70
End: 2025-08-14
Payer: COMMERCIAL

## 2025-08-14 VITALS
SYSTOLIC BLOOD PRESSURE: 121 MMHG | HEIGHT: 70 IN | WEIGHT: 188.94 LBS | DIASTOLIC BLOOD PRESSURE: 74 MMHG | BODY MASS INDEX: 27.05 KG/M2

## 2025-08-14 DIAGNOSIS — M99.04 SOMATIC DYSFUNCTION OF SACRAL REGION: ICD-10-CM

## 2025-08-14 DIAGNOSIS — M99.02 SOMATIC DYSFUNCTION OF THORACIC REGION: ICD-10-CM

## 2025-08-14 DIAGNOSIS — M25.551 RIGHT HIP PAIN: Primary | ICD-10-CM

## 2025-08-14 DIAGNOSIS — M79.10 MYALGIA: ICD-10-CM

## 2025-08-14 DIAGNOSIS — M25.512 ACUTE PAIN OF LEFT SHOULDER: Primary | ICD-10-CM

## 2025-08-14 DIAGNOSIS — M99.03 SOMATIC DYSFUNCTION OF LUMBAR REGION: ICD-10-CM

## 2025-08-14 DIAGNOSIS — M99.05 SOMATIC DYSFUNCTION OF PELVIS REGION: ICD-10-CM

## 2025-08-14 DIAGNOSIS — M25.619 IMPAIRED RANGE OF MOTION OF SHOULDER, UNSPECIFIED LATERALITY: ICD-10-CM

## 2025-08-14 DIAGNOSIS — M99.06 SOMATIC DYSFUNCTION OF LOWER EXTREMITY: ICD-10-CM

## 2025-08-14 DIAGNOSIS — M16.11 PRIMARY OSTEOARTHRITIS OF RIGHT HIP: ICD-10-CM

## 2025-08-14 DIAGNOSIS — M99.08 SOMATIC DYSFUNCTION OF RIB CAGE REGION: ICD-10-CM

## 2025-08-14 DIAGNOSIS — M99.07 SOMATIC DYSFUNCTION OF UPPER EXTREMITY: ICD-10-CM

## 2025-08-14 DIAGNOSIS — M79.18 MYOFASCIAL PAIN: ICD-10-CM

## 2025-08-14 PROCEDURE — 99999 PR PBB SHADOW E&M-EST. PATIENT-LVL III: CPT | Mod: PBBFAC,,, | Performed by: ORTHOPAEDIC SURGERY

## 2025-08-14 PROCEDURE — 97140 MANUAL THERAPY 1/> REGIONS: CPT | Performed by: PHYSICAL THERAPIST

## 2025-08-14 PROCEDURE — 97112 NEUROMUSCULAR REEDUCATION: CPT | Performed by: PHYSICAL THERAPIST

## 2025-09-04 ENCOUNTER — CLINICAL SUPPORT (OUTPATIENT)
Dept: REHABILITATION | Facility: HOSPITAL | Age: 70
End: 2025-09-04
Payer: COMMERCIAL

## 2025-09-04 DIAGNOSIS — M25.619 IMPAIRED RANGE OF MOTION OF SHOULDER, UNSPECIFIED LATERALITY: ICD-10-CM

## 2025-09-04 DIAGNOSIS — M25.512 ACUTE PAIN OF LEFT SHOULDER: Primary | ICD-10-CM

## 2025-09-04 PROCEDURE — 97140 MANUAL THERAPY 1/> REGIONS: CPT | Performed by: PHYSICAL THERAPIST

## 2025-09-04 PROCEDURE — 97112 NEUROMUSCULAR REEDUCATION: CPT | Performed by: PHYSICAL THERAPIST

## (undated) DEVICE — CANNULA TWIST IN 7MM X 7CM

## (undated) DEVICE — CORD BIPOLAR 12 FOOT

## (undated) DEVICE — GLOVE BIOGEL ECLIPSE SZ 7

## (undated) DEVICE — DRAPE SURG W/TWL 17 5/8X23

## (undated) DEVICE — DRESSING GAUZE 6PLY 4X4

## (undated) DEVICE — PASSER SUTURE SCORPION 3.2MM

## (undated) DEVICE — SPLINT PLASTER EXT FAST 4X15

## (undated) DEVICE — SYR 10CC LUER LOCK

## (undated) DEVICE — GLOVE BIOGEL PI MICRO INDIC 7

## (undated) DEVICE — BANDAGE GAUZE 6PLY FLUFF 2X3

## (undated) DEVICE — GOWN ECLIPSE REINF LV4 XLNG XL

## (undated) DEVICE — Device

## (undated) DEVICE — SUT 4.0 ETHILON

## (undated) DEVICE — BANDAGE SOFFORM STER 2IN

## (undated) DEVICE — PAD UNDERPAD 30X30

## (undated) DEVICE — PACK UPPER EXTREMITY BAPTIST

## (undated) DEVICE — NDL HYPO REG 25G X 1 1/2

## (undated) DEVICE — COVER CAMERA OPERATING ROOM

## (undated) DEVICE — GLOVE BIOGEL PI MICRO SZ 7

## (undated) DEVICE — SOL 9P NACL IRR PIC IL

## (undated) DEVICE — BANDAGE ELASTIC ACE 2IN 10/CA

## (undated) DEVICE — TAPE SURG DURAPORE 2 X10YD

## (undated) DEVICE — GLOVE SENSICARE PI GRN 7.5

## (undated) DEVICE — SEE MEDLINE ITEM 157131

## (undated) DEVICE — APPLICATOR CHLORAPREP ORN 26ML

## (undated) DEVICE — ADHESIVE MASTISOL VIAL 48/BX

## (undated) DEVICE — NDL SCORPION HD MEGALOADER

## (undated) DEVICE — SUT 4/0 18IN ETHILON BL P3

## (undated) DEVICE — NDL 18GA X1 1/2 REG BEVEL

## (undated) DEVICE — BLADE SURG STAINLESS STEEL #15

## (undated) DEVICE — BNDG COFLEX FOAM LF2 ST 4X5YD

## (undated) DEVICE — TOURNIQUET SB QC DP 18X4IN

## (undated) DEVICE — GAUZE SPONGE 4X4 12PLY

## (undated) DEVICE — DRESSING XEROFORM FOIL PK 1X8

## (undated) DEVICE — BANDAGE ELASTIC 2X5 VELCRO ST

## (undated) DEVICE — GLOVE BIOGEL SKINSENSE PI 8.0

## (undated) DEVICE — PAD CAST SPECIALIST STRL 4

## (undated) DEVICE — UNDERGLOVES BIOGEL PI SIZE 8.5

## (undated) DEVICE — FORCEP STRAIGHT DISP

## (undated) DEVICE — TRAY DRY SKIN SCRUB PREP

## (undated) DEVICE — SOL PVP-I SCRUB 7.5% 4OZ

## (undated) DEVICE — DRESSING N ADH OIL EMUL 3X3

## (undated) DEVICE — PADDING CAST 2IN DELTA ROLL

## (undated) DEVICE — SOL NACL IRR 3000ML

## (undated) DEVICE — SEE MEDLINE ITEM 146308

## (undated) DEVICE — MARKER SKIN RULER STERILE

## (undated) DEVICE — SYR B-D DISP CONTROL 10CC100/C

## (undated) DEVICE — CHLORAPREP 10.5 ML APPLICATOR

## (undated) DEVICE — BLADE SHAVER LANZA 4.2X13CM

## (undated) DEVICE — CANNULA PASSPORT 8 MM X 3CM

## (undated) DEVICE — DRAPE STERI U-SHAPED 47X51IN

## (undated) DEVICE — SUT SUTURETAPE TIGERLINK 1.3MM

## (undated) DEVICE — SCRUB 10% POVIDONE IODINE 4OZ

## (undated) DEVICE — NDL SAFETY 22G X 1.5 ECLIPSE

## (undated) DEVICE — PAD CAST 2 IN X 4YDS STERILE

## (undated) DEVICE — BLADE POWERASP 4.0MMX13CM

## (undated) DEVICE — ALCOHOL 70% ISOP W/GREEN 16OZ

## (undated) DEVICE — BANDAGE CONFORM 3IN STRL

## (undated) DEVICE — SUT PDS II 0 CT-1 VIL MONO

## (undated) DEVICE — COVER LIGHT HANDLE 80/CA

## (undated) DEVICE — BNDG COFLEX FOAM LF2 ST 6X5YD

## (undated) DEVICE — DRAPE STERI INSTRUMENT 1018

## (undated) DEVICE — TUBING SUC UNIV W/CONN 12FT

## (undated) DEVICE — SUPPORT SLING SHOT II MEDIUM

## (undated) DEVICE — SYR 30CC LUER LOCK

## (undated) DEVICE — SEE MEDLINE ITEM 157110

## (undated) DEVICE — SEE MEDLINE ITEM 146322

## (undated) DEVICE — PROBE ARTHO ENERGY 90 DEG

## (undated) DEVICE — KIT SHOULDER POSITIONER SPIDER

## (undated) DEVICE — GLOVE SENSICARE PI SURG 7

## (undated) DEVICE — SUT 5-0 CHROMIC GUT / P-3

## (undated) DEVICE — PADDING CAST SOFT-ROLL 4 X 4

## (undated) DEVICE — DRAPE STERI-DRAPE 1000 17X11IN

## (undated) DEVICE — DRESSING AQUACEL AG SILVER 4X4

## (undated) DEVICE — WRAP SHLDR HIP ACCU THRM PACK

## (undated) DEVICE — NDL HYPO STD REG BVL 18GX1.5IN

## (undated) DEVICE — CLOSURE SKIN STERI STRIP 1/2X4

## (undated) DEVICE — CORD FOR BIPOLAR FORCEPS 12